# Patient Record
Sex: FEMALE | Race: WHITE | NOT HISPANIC OR LATINO | Employment: OTHER | URBAN - METROPOLITAN AREA
[De-identification: names, ages, dates, MRNs, and addresses within clinical notes are randomized per-mention and may not be internally consistent; named-entity substitution may affect disease eponyms.]

---

## 2022-02-25 ENCOUNTER — HOSPITAL ENCOUNTER (EMERGENCY)
Facility: HOSPITAL | Age: 27
Discharge: HOME | End: 2022-02-25
Attending: EMERGENCY MEDICINE
Payer: COMMERCIAL

## 2022-02-25 VITALS
OXYGEN SATURATION: 100 % | TEMPERATURE: 98 F | DIASTOLIC BLOOD PRESSURE: 55 MMHG | RESPIRATION RATE: 14 BRPM | HEIGHT: 59 IN | HEART RATE: 85 BPM | BODY MASS INDEX: 32.4 KG/M2 | WEIGHT: 160.69 LBS | SYSTOLIC BLOOD PRESSURE: 108 MMHG

## 2022-02-25 DIAGNOSIS — M54.50 ACUTE BILATERAL LOW BACK PAIN WITHOUT SCIATICA: Primary | ICD-10-CM

## 2022-02-25 LAB
ALBUMIN SERPL BCP-MCNC: 4.6 G/DL (ref 3.5–5.2)
ALP SERPL-CCNC: 58 U/L (ref 55–135)
ALT SERPL W/O P-5'-P-CCNC: 53 U/L (ref 10–44)
ANION GAP SERPL CALC-SCNC: 16 MMOL/L (ref 8–16)
AST SERPL-CCNC: 27 U/L (ref 10–40)
B-HCG UR QL: NEGATIVE
BASOPHILS # BLD AUTO: 0.04 K/UL (ref 0–0.2)
BASOPHILS NFR BLD: 0.3 % (ref 0–1.9)
BILIRUB SERPL-MCNC: 0.6 MG/DL (ref 0.1–1)
BILIRUB UR QL STRIP: NEGATIVE
BUN SERPL-MCNC: 11 MG/DL (ref 6–20)
CALCIUM SERPL-MCNC: 9.8 MG/DL (ref 8.7–10.5)
CHLORIDE SERPL-SCNC: 104 MMOL/L (ref 95–110)
CLARITY UR: CLEAR
CO2 SERPL-SCNC: 16 MMOL/L (ref 23–29)
COLOR UR: YELLOW
CREAT SERPL-MCNC: 0.9 MG/DL (ref 0.5–1.4)
DIFFERENTIAL METHOD: ABNORMAL
EOSINOPHIL # BLD AUTO: 0 K/UL (ref 0–0.5)
EOSINOPHIL NFR BLD: 0.2 % (ref 0–8)
ERYTHROCYTE [DISTWIDTH] IN BLOOD BY AUTOMATED COUNT: 13.2 % (ref 11.5–14.5)
EST. GFR  (AFRICAN AMERICAN): >60 ML/MIN/1.73 M^2
EST. GFR  (NON AFRICAN AMERICAN): >60 ML/MIN/1.73 M^2
GLUCOSE SERPL-MCNC: 141 MG/DL (ref 70–110)
GLUCOSE UR QL STRIP: NEGATIVE
HCT VFR BLD AUTO: 42.7 % (ref 37–48.5)
HGB BLD-MCNC: 14.3 G/DL (ref 12–16)
HGB UR QL STRIP: ABNORMAL
IMM GRANULOCYTES # BLD AUTO: 0.09 K/UL (ref 0–0.04)
IMM GRANULOCYTES NFR BLD AUTO: 0.8 % (ref 0–0.5)
KETONES UR QL STRIP: ABNORMAL
LEUKOCYTE ESTERASE UR QL STRIP: NEGATIVE
LIPASE SERPL-CCNC: 9 U/L (ref 4–60)
LYMPHOCYTES # BLD AUTO: 0.3 K/UL (ref 1–4.8)
LYMPHOCYTES NFR BLD: 2.6 % (ref 18–48)
MCH RBC QN AUTO: 27.2 PG (ref 27–31)
MCHC RBC AUTO-ENTMCNC: 33.5 G/DL (ref 32–36)
MCV RBC AUTO: 81 FL (ref 82–98)
MONOCYTES # BLD AUTO: 0.8 K/UL (ref 0.3–1)
MONOCYTES NFR BLD: 7.1 % (ref 4–15)
NEUTROPHILS # BLD AUTO: 10.3 K/UL (ref 1.8–7.7)
NEUTROPHILS NFR BLD: 89 % (ref 38–73)
NITRITE UR QL STRIP: NEGATIVE
NRBC BLD-RTO: 0 /100 WBC
PH UR STRIP: 6 [PH] (ref 5–8)
PLATELET # BLD AUTO: 307 K/UL (ref 150–450)
PMV BLD AUTO: 9 FL (ref 9.2–12.9)
POTASSIUM SERPL-SCNC: 3.7 MMOL/L (ref 3.5–5.1)
PROT SERPL-MCNC: 8.3 G/DL (ref 6–8.4)
PROT UR QL STRIP: NEGATIVE
RBC # BLD AUTO: 5.26 M/UL (ref 4–5.4)
SODIUM SERPL-SCNC: 136 MMOL/L (ref 136–145)
SP GR UR STRIP: 1.02 (ref 1–1.03)
URN SPEC COLLECT METH UR: ABNORMAL
UROBILINOGEN UR STRIP-ACNC: NEGATIVE EU/DL
WBC # BLD AUTO: 11.53 K/UL (ref 3.9–12.7)

## 2022-02-25 PROCEDURE — 25000003 PHARM REV CODE 250: Performed by: EMERGENCY MEDICINE

## 2022-02-25 PROCEDURE — 99284 EMERGENCY DEPT VISIT MOD MDM: CPT | Mod: 25

## 2022-02-25 PROCEDURE — 81025 URINE PREGNANCY TEST: CPT | Performed by: NURSE PRACTITIONER

## 2022-02-25 PROCEDURE — 96365 THER/PROPH/DIAG IV INF INIT: CPT

## 2022-02-25 PROCEDURE — 96375 TX/PRO/DX INJ NEW DRUG ADDON: CPT

## 2022-02-25 PROCEDURE — 85025 COMPLETE CBC W/AUTO DIFF WBC: CPT | Performed by: NURSE PRACTITIONER

## 2022-02-25 PROCEDURE — 63600175 PHARM REV CODE 636 W HCPCS: Performed by: NURSE PRACTITIONER

## 2022-02-25 PROCEDURE — 81003 URINALYSIS AUTO W/O SCOPE: CPT | Performed by: NURSE PRACTITIONER

## 2022-02-25 PROCEDURE — 63600175 PHARM REV CODE 636 W HCPCS: Performed by: EMERGENCY MEDICINE

## 2022-02-25 PROCEDURE — 80053 COMPREHEN METABOLIC PANEL: CPT | Performed by: NURSE PRACTITIONER

## 2022-02-25 PROCEDURE — 83690 ASSAY OF LIPASE: CPT | Performed by: NURSE PRACTITIONER

## 2022-02-25 RX ORDER — ACETAMINOPHEN 500 MG
1000 TABLET ORAL
Status: COMPLETED | OUTPATIENT
Start: 2022-02-25 | End: 2022-02-25

## 2022-02-25 RX ORDER — NAPROXEN 375 MG/1
375 TABLET ORAL 2 TIMES DAILY
Qty: 60 TABLET | Refills: 0 | Status: SHIPPED | OUTPATIENT
Start: 2022-02-25 | End: 2022-05-05

## 2022-02-25 RX ORDER — ONDANSETRON 4 MG/1
4 TABLET, FILM COATED ORAL EVERY 6 HOURS
Qty: 30 TABLET | Refills: 0 | Status: SHIPPED | OUTPATIENT
Start: 2022-02-25 | End: 2022-05-05

## 2022-02-25 RX ORDER — KETOROLAC TROMETHAMINE 30 MG/ML
15 INJECTION, SOLUTION INTRAMUSCULAR; INTRAVENOUS
Status: COMPLETED | OUTPATIENT
Start: 2022-02-25 | End: 2022-02-25

## 2022-02-25 RX ORDER — ONDANSETRON 2 MG/ML
4 INJECTION INTRAMUSCULAR; INTRAVENOUS
Status: COMPLETED | OUTPATIENT
Start: 2022-02-25 | End: 2022-02-25

## 2022-02-25 RX ADMIN — PROMETHAZINE HYDROCHLORIDE 12.5 MG: 25 INJECTION INTRAMUSCULAR; INTRAVENOUS at 05:02

## 2022-02-25 RX ADMIN — ONDANSETRON 4 MG: 2 INJECTION INTRAMUSCULAR; INTRAVENOUS at 03:02

## 2022-02-25 RX ADMIN — KETOROLAC TROMETHAMINE 15 MG: 30 INJECTION, SOLUTION INTRAMUSCULAR at 03:02

## 2022-02-25 RX ADMIN — ACETAMINOPHEN 1000 MG: 500 TABLET ORAL at 03:02

## 2022-02-25 NOTE — FIRST PROVIDER EVALUATION
Medical screening exam completed.  I have conducted a focused provider triage encounter, findings are as follows:    Brief history of present illness:  Presents with complaints of flank pain, body aches, nausea and vomiting.  Patient states she believes she has a kidney stone because this feels like the last time she had 1. Onset of symptoms was today.    There were no vitals filed for this visit.    Pertinent physical exam:      Brief workup plan:      Preliminary workup initiated; this workup will be continued and followed by the physician or advanced practice provider that is assigned to the patient when roomed.

## 2022-02-25 NOTE — ED PROVIDER NOTES
SCRIBE #1 NOTE: I, Ling Valadez, am scribing for, and in the presence of, Jarvis Edgar Do, MD. I have scribed the HPI, ROS, and PEx.          History     Chief Complaint   Patient presents with    Flank Pain     Pain to R side flank radiating down R leg. N/V/D since yesterday am and flank pain started last PM     Review of patient's allergies indicates:  No Known Allergies      History of Present Illness     HPI    2/25/2022, 4:24 AM  History obtained from the patient      History of Present Illness: Zheng Beverly is a 26 y.o. female patient with a PMHx of kidney stones who presents to the Emergency Department for evaluation of L flank pain which onset gradually several hours ago. States the pain is radiating down the front of both legs. Symptoms are constant and moderate in severity. No mitigating or exacerbating factors reported. Associated sxs include n/v/d since yesterday. Patient denies any fever, chills, dysuria, hematuria, frequency, urgency, abdominal pain, and all other sxs at this time. No prior tx reported. Pt states her last kidney stone was when she was 19 and that she was able to pass it. No further complaints or concerns at this time.       Arrival mode: Personal vehicle    PCP: KAITY Bello        Past Medical History:  No past medical history on file.    Past Surgical History:  No past surgical history on file.      Family History:  No family history on file.    Social History:  Social History     Tobacco Use    Smoking status: Not on file    Smokeless tobacco: Not on file   Substance and Sexual Activity    Alcohol use: Not on file    Drug use: Not on file    Sexual activity: Not on file        Review of Systems     Review of Systems   Constitutional: Negative for chills and fever.   HENT: Negative for sore throat.    Respiratory: Negative for shortness of breath.    Cardiovascular: Negative for chest pain.   Gastrointestinal: Positive for diarrhea, nausea and vomiting. Negative for  "abdominal pain.   Genitourinary: Positive for flank pain (L). Negative for dysuria, frequency, hematuria and urgency.   Musculoskeletal: Positive for myalgias (front of BLE). Negative for back pain.   Skin: Negative for rash.   Neurological: Negative for weakness.   Hematological: Does not bruise/bleed easily.   All other systems reviewed and are negative.     Physical Exam     Initial Vitals [02/25/22 0236]   BP Pulse Resp Temp SpO2   122/81 87 16 97.5 °F (36.4 °C) 96 %      MAP       --          Physical Exam  Nursing Notes and Vital Signs Reviewed.  Constitutional: Patient is in no acute distress. Well-developed and well-nourished.  Head: Atraumatic. Normocephalic.  Eyes: PERRL. EOM intact. Conjunctivae are not pale. No scleral icterus.  ENT: Mucous membranes are moist. Oropharynx is clear and symmetric.    Neck: Supple. Full ROM. No lymphadenopathy.  Cardiovascular: Regular rate. Regular rhythm. No murmurs, rubs, or gallops. Distal pulses are 2+ and symmetric.  Pulmonary/Chest: No respiratory distress. Clear to auscultation bilaterally. No wheezing or rales.  Abdominal: Soft and non-distended.  There is no tenderness.  No rebound, guarding, or rigidity. Good bowel sounds.  Genitourinary: No CVA tenderness  Musculoskeletal: Moves all extremities. No obvious deformities. No edema. No calf tenderness. Pt able to walk with no acute problems.   Skin: Warm and dry.  Neurological:  Alert, awake, and appropriate.  Normal speech.  No acute focal neurological deficits are appreciated.  Psychiatric: Normal affect. Good eye contact. Appropriate in content.     ED Course   Procedures  ED Vital Signs:  Vitals:    02/25/22 0236 02/25/22 0510   BP: 122/81 (!) 105/55   Pulse: 87 82   Resp: 16 18   Temp: 97.5 °F (36.4 °C)    TempSrc: Oral    SpO2: 96% 100%   Weight: 72.9 kg (160 lb 11.5 oz)    Height: 4' 11" (1.499 m)        Abnormal Lab Results:  Labs Reviewed   CBC W/ AUTO DIFFERENTIAL - Abnormal; Notable for the following " components:       Result Value    MCV 81 (*)     MPV 9.0 (*)     Immature Granulocytes 0.8 (*)     Gran # (ANC) 10.3 (*)     Immature Grans (Abs) 0.09 (*)     Lymph # 0.3 (*)     Gran % 89.0 (*)     Lymph % 2.6 (*)     All other components within normal limits   COMPREHENSIVE METABOLIC PANEL - Abnormal; Notable for the following components:    CO2 16 (*)     Glucose 141 (*)     ALT 53 (*)     All other components within normal limits   URINALYSIS, REFLEX TO URINE CULTURE - Abnormal; Notable for the following components:    Ketones, UA 1+ (*)     Occult Blood UA Trace (*)     All other components within normal limits    Narrative:     Specimen Source->Urine   LIPASE   PREGNANCY TEST, URINE RAPID    Narrative:     Specimen Source->Urine        All Lab Results:  Results for orders placed or performed during the hospital encounter of 02/25/22   CBC auto differential   Result Value Ref Range    WBC 11.53 3.90 - 12.70 K/uL    RBC 5.26 4.00 - 5.40 M/uL    Hemoglobin 14.3 12.0 - 16.0 g/dL    Hematocrit 42.7 37.0 - 48.5 %    MCV 81 (L) 82 - 98 fL    MCH 27.2 27.0 - 31.0 pg    MCHC 33.5 32.0 - 36.0 g/dL    RDW 13.2 11.5 - 14.5 %    Platelets 307 150 - 450 K/uL    MPV 9.0 (L) 9.2 - 12.9 fL    Immature Granulocytes 0.8 (H) 0.0 - 0.5 %    Gran # (ANC) 10.3 (H) 1.8 - 7.7 K/uL    Immature Grans (Abs) 0.09 (H) 0.00 - 0.04 K/uL    Lymph # 0.3 (L) 1.0 - 4.8 K/uL    Mono # 0.8 0.3 - 1.0 K/uL    Eos # 0.0 0.0 - 0.5 K/uL    Baso # 0.04 0.00 - 0.20 K/uL    nRBC 0 0 /100 WBC    Gran % 89.0 (H) 38.0 - 73.0 %    Lymph % 2.6 (L) 18.0 - 48.0 %    Mono % 7.1 4.0 - 15.0 %    Eosinophil % 0.2 0.0 - 8.0 %    Basophil % 0.3 0.0 - 1.9 %    Differential Method Automated    Comprehensive metabolic panel   Result Value Ref Range    Sodium 136 136 - 145 mmol/L    Potassium 3.7 3.5 - 5.1 mmol/L    Chloride 104 95 - 110 mmol/L    CO2 16 (L) 23 - 29 mmol/L    Glucose 141 (H) 70 - 110 mg/dL    BUN 11 6 - 20 mg/dL    Creatinine 0.9 0.5 - 1.4 mg/dL     Calcium 9.8 8.7 - 10.5 mg/dL    Total Protein 8.3 6.0 - 8.4 g/dL    Albumin 4.6 3.5 - 5.2 g/dL    Total Bilirubin 0.6 0.1 - 1.0 mg/dL    Alkaline Phosphatase 58 55 - 135 U/L    AST 27 10 - 40 U/L    ALT 53 (H) 10 - 44 U/L    Anion Gap 16 8 - 16 mmol/L    eGFR if African American >60 >60 mL/min/1.73 m^2    eGFR if non African American >60 >60 mL/min/1.73 m^2   Lipase   Result Value Ref Range    Lipase 9 4 - 60 U/L   Urinalysis, Reflex to Urine Culture Urine, Clean Catch    Specimen: Urine   Result Value Ref Range    Specimen UA Urine, Clean Catch     Color, UA Yellow Yellow, Straw, Kelly    Appearance, UA Clear Clear    pH, UA 6.0 5.0 - 8.0    Specific Gravity, UA 1.020 1.005 - 1.030    Protein, UA Negative Negative    Glucose, UA Negative Negative    Ketones, UA 1+ (A) Negative    Bilirubin (UA) Negative Negative    Occult Blood UA Trace (A) Negative    Nitrite, UA Negative Negative    Urobilinogen, UA Negative <2.0 EU/dL    Leukocytes, UA Negative Negative   Pregnancy, urine rapid   Result Value Ref Range    Preg Test, Ur Negative        Imaging Results:  Imaging Results          CT Renal Stone Study ABD Pelvis WO (In process)                Per Stat rad no Acute findings in abdomen or pelvis             The Emergency Provider reviewed the vital signs and test results, which are outlined above.     ED Discussion     5:57 AM: Reassessed pt at this time. Discussed with pt all pertinent ED information and results. Discussed pt dx and plan of tx. Gave pt all f/u and return to the ED instructions. All questions and concerns were addressed at this time. Pt expresses understanding of information and instructions, and is comfortable with plan to discharge. Pt is stable for discharge.    I discussed with patient and/or family/caretaker that evaluation in the ED does not suggest any emergent or life threatening medical conditions requiring immediate intervention beyond what was provided in the ED, and I believe patient is  safe for discharge.  Regardless, an unremarkable evaluation in the ED does not preclude the development or presence of a serious of life threatening condition. As such, patient was instructed to return immediately for any worsening or change in current symptoms.      Medical Decision Making:   Clinical Tests:   Lab Tests: Ordered and Reviewed  Radiological Study: Ordered and Reviewed           ED Medication(s):  Medications   ondansetron injection 4 mg (4 mg Intravenous Given 2/25/22 0312)   acetaminophen tablet 1,000 mg (1,000 mg Oral Given 2/25/22 0312)   ketorolac injection 15 mg (15 mg Intravenous Given 2/25/22 0340)   promethazine (PHENERGAN) 12.5 mg in dextrose 5 % 50 mL IVPB (12.5 mg Intravenous New Bag 2/25/22 0520)       New Prescriptions    NAPROXEN (EC-NAPROSYN) 375 MG TBEC EC TABLET    Take 1 tablet (375 mg total) by mouth 2 (two) times daily.    ONDANSETRON (ZOFRAN) 4 MG TABLET    Take 1 tablet (4 mg total) by mouth every 6 (six) hours.        Follow-up Information     KAITY Bello In 2 days.    Specialty: Family Medicine  Contact information:  [address] [address]  [address] [place] [address]  [phone]                             Scribe Attestation:   Scribe #1: I performed the above scribed service and the documentation accurately describes the services I performed. I attest to the accuracy of the note.     Attending:   Physician Attestation Statement for Scribe #1: I, Jarvis Edgar Do, MD, personally performed the services described in this documentation, as scribed by Ling Valadez, in my presence, and it is both accurate and complete.           Clinical Impression       ICD-10-CM ICD-9-CM   1. Acute bilateral low back pain without sciatica  M54.50 724.2     338.19       Disposition:   Disposition: Discharged  Condition: Stable         Jarvis Edgar Do, MD  02/25/22 0600

## 2023-09-13 ENCOUNTER — HOSPITAL ENCOUNTER (EMERGENCY)
Facility: HOSPITAL | Age: 28
Discharge: HOME | End: 2023-09-13
Attending: STUDENT IN AN ORGANIZED HEALTH CARE EDUCATION/TRAINING PROGRAM
Payer: MEDICAID

## 2023-09-13 VITALS
OXYGEN SATURATION: 97 % | WEIGHT: 157.63 LBS | TEMPERATURE: 99 F | DIASTOLIC BLOOD PRESSURE: 60 MMHG | RESPIRATION RATE: 18 BRPM | SYSTOLIC BLOOD PRESSURE: 115 MMHG | HEART RATE: 101 BPM | BODY MASS INDEX: 31.84 KG/M2

## 2023-09-13 DIAGNOSIS — N39.0 URINARY TRACT INFECTION WITHOUT HEMATURIA, SITE UNSPECIFIED: Primary | ICD-10-CM

## 2023-09-13 LAB
ALBUMIN SERPL BCP-MCNC: 2.7 G/DL (ref 3.5–5.2)
ALP SERPL-CCNC: 71 U/L (ref 55–135)
ALT SERPL W/O P-5'-P-CCNC: 17 U/L (ref 10–44)
ANION GAP SERPL CALC-SCNC: 10 MMOL/L (ref 8–16)
AST SERPL-CCNC: 10 U/L (ref 10–40)
BACTERIA #/AREA URNS HPF: ABNORMAL /HPF
BASOPHILS # BLD AUTO: 0.02 K/UL (ref 0–0.2)
BASOPHILS NFR BLD: 0.1 % (ref 0–1.9)
BILIRUB SERPL-MCNC: 0.9 MG/DL (ref 0.1–1)
BILIRUB UR QL STRIP: NEGATIVE
BUN SERPL-MCNC: 4 MG/DL (ref 6–20)
CALCIUM SERPL-MCNC: 8.8 MG/DL (ref 8.7–10.5)
CHLORIDE SERPL-SCNC: 101 MMOL/L (ref 95–110)
CLARITY UR: CLEAR
CO2 SERPL-SCNC: 20 MMOL/L (ref 23–29)
COLOR UR: YELLOW
CREAT SERPL-MCNC: 0.7 MG/DL (ref 0.5–1.4)
DIFFERENTIAL METHOD: ABNORMAL
EOSINOPHIL # BLD AUTO: 0.1 K/UL (ref 0–0.5)
EOSINOPHIL NFR BLD: 0.6 % (ref 0–8)
ERYTHROCYTE [DISTWIDTH] IN BLOOD BY AUTOMATED COUNT: 13.8 % (ref 11.5–14.5)
EST. GFR  (NO RACE VARIABLE): >60 ML/MIN/1.73 M^2
GLUCOSE SERPL-MCNC: 114 MG/DL (ref 70–110)
GLUCOSE UR QL STRIP: NEGATIVE
HCT VFR BLD AUTO: 30.5 % (ref 37–48.5)
HGB BLD-MCNC: 10 G/DL (ref 12–16)
HGB UR QL STRIP: ABNORMAL
HYALINE CASTS #/AREA URNS LPF: 0 /LPF
IMM GRANULOCYTES # BLD AUTO: 0.33 K/UL (ref 0–0.04)
IMM GRANULOCYTES NFR BLD AUTO: 2.3 % (ref 0–0.5)
KETONES UR QL STRIP: NEGATIVE
LACTATE SERPL-SCNC: 1.5 MMOL/L (ref 0.5–2.2)
LEUKOCYTE ESTERASE UR QL STRIP: ABNORMAL
LYMPHOCYTES # BLD AUTO: 0.8 K/UL (ref 1–4.8)
LYMPHOCYTES NFR BLD: 5.5 % (ref 18–48)
MCH RBC QN AUTO: 27.2 PG (ref 27–31)
MCHC RBC AUTO-ENTMCNC: 32.8 G/DL (ref 32–36)
MCV RBC AUTO: 83 FL (ref 82–98)
MICROSCOPIC COMMENT: ABNORMAL
MONOCYTES # BLD AUTO: 1.2 K/UL (ref 0.3–1)
MONOCYTES NFR BLD: 8 % (ref 4–15)
NEUTROPHILS # BLD AUTO: 12.1 K/UL (ref 1.8–7.7)
NEUTROPHILS NFR BLD: 83.5 % (ref 38–73)
NITRITE UR QL STRIP: NEGATIVE
NON-SQ EPI CELLS #/AREA URNS HPF: 2 /HPF
NRBC BLD-RTO: 0 /100 WBC
PH UR STRIP: 6 [PH] (ref 5–8)
PLATELET # BLD AUTO: 198 K/UL (ref 150–450)
PMV BLD AUTO: 9.4 FL (ref 9.2–12.9)
POTASSIUM SERPL-SCNC: 3.2 MMOL/L (ref 3.5–5.1)
PROT SERPL-MCNC: 6.6 G/DL (ref 6–8.4)
PROT UR QL STRIP: ABNORMAL
RBC # BLD AUTO: 3.68 M/UL (ref 4–5.4)
RBC #/AREA URNS HPF: 10 /HPF (ref 0–4)
SODIUM SERPL-SCNC: 131 MMOL/L (ref 136–145)
SP GR UR STRIP: 1.02 (ref 1–1.03)
SQUAMOUS #/AREA URNS HPF: 10 /HPF
URN SPEC COLLECT METH UR: ABNORMAL
UROBILINOGEN UR STRIP-ACNC: ABNORMAL EU/DL
WBC # BLD AUTO: 14.46 K/UL (ref 3.9–12.7)
WBC #/AREA URNS HPF: 60 /HPF (ref 0–5)

## 2023-09-13 PROCEDURE — 87040 BLOOD CULTURE FOR BACTERIA: CPT | Mod: 59 | Performed by: NURSE PRACTITIONER

## 2023-09-13 PROCEDURE — 85025 COMPLETE CBC W/AUTO DIFF WBC: CPT | Performed by: NURSE PRACTITIONER

## 2023-09-13 PROCEDURE — 36415 COLL VENOUS BLD VENIPUNCTURE: CPT | Performed by: NURSE PRACTITIONER

## 2023-09-13 PROCEDURE — 83605 ASSAY OF LACTIC ACID: CPT | Performed by: NURSE PRACTITIONER

## 2023-09-13 PROCEDURE — 63600175 PHARM REV CODE 636 W HCPCS: Performed by: NURSE PRACTITIONER

## 2023-09-13 PROCEDURE — 81000 URINALYSIS NONAUTO W/SCOPE: CPT | Performed by: NURSE PRACTITIONER

## 2023-09-13 PROCEDURE — 87086 URINE CULTURE/COLONY COUNT: CPT | Performed by: NURSE PRACTITIONER

## 2023-09-13 PROCEDURE — 99284 EMERGENCY DEPT VISIT MOD MDM: CPT

## 2023-09-13 PROCEDURE — 80053 COMPREHEN METABOLIC PANEL: CPT | Performed by: NURSE PRACTITIONER

## 2023-09-13 PROCEDURE — 25000003 PHARM REV CODE 250: Performed by: NURSE PRACTITIONER

## 2023-09-13 PROCEDURE — 96372 THER/PROPH/DIAG INJ SC/IM: CPT | Performed by: NURSE PRACTITIONER

## 2023-09-13 RX ORDER — CEFTRIAXONE 1 G/1
1 INJECTION, POWDER, FOR SOLUTION INTRAMUSCULAR; INTRAVENOUS
Status: COMPLETED | OUTPATIENT
Start: 2023-09-13 | End: 2023-09-13

## 2023-09-13 RX ORDER — ACETAMINOPHEN 500 MG
1000 TABLET ORAL
Status: COMPLETED | OUTPATIENT
Start: 2023-09-13 | End: 2023-09-13

## 2023-09-13 RX ADMIN — CEFTRIAXONE SODIUM 1 G: 1 INJECTION, POWDER, FOR SOLUTION INTRAMUSCULAR; INTRAVENOUS at 04:09

## 2023-09-13 RX ADMIN — ACETAMINOPHEN 1000 MG: 500 TABLET ORAL at 03:09

## 2023-09-13 NOTE — ED PROVIDER NOTES
Encounter Date: 2023       History     Chief Complaint   Patient presents with    Urinary Tract Infection     Patient is 20 weeks pregnant to ER reports she has a UTI DX 3 days ago has been taking amox for this but states she still has a fever      Chief complaint: UTI, fever  28-year-old female who is currently 20 weeks pregnant presents to be evaluated for fever she is had for the last 3 days.  She reports she was diagnosed at an urgent care with a UTI and started on amoxicillin.  She reports her fever has persisted with a T-max of 101°.  States the last time she took her temperature was 4:00 a.m. this morning.  She reports she has been taking Tylenol.  Reports some mild low back pain denies any vaginal bleeding, discharge, flu like good or contractions.  States she contacted her OBGYN was referred to the nearest emergency room.  She reports she is continued to eat and drink per usual denies any hematuria or odor or color change.       Review of patient's allergies indicates:  No Known Allergies  History reviewed. No pertinent past medical history.  Past Surgical History:   Procedure Laterality Date     SECTION       History reviewed. No pertinent family history.  Social History     Tobacco Use    Smoking status: Never    Smokeless tobacco: Never     Review of Systems   Constitutional:  Positive for fever.   Genitourinary:  Positive for pelvic pain. Negative for dysuria and hematuria.   Musculoskeletal:  Positive for back pain.       Physical Exam     Initial Vitals [23 1401]   BP Pulse Resp Temp SpO2   116/66 (!) 113 18 98.6 °F (37 °C) 97 %      MAP       --         Physical Exam    Nursing note and vitals reviewed.  Constitutional: She appears well-developed and well-nourished.   HENT:   Head: Normocephalic and atraumatic.   Cardiovascular:  Normal rate, regular rhythm, normal heart sounds and intact distal pulses.     Exam reveals no gallop and no friction rub.       No murmur  heard.  Pulmonary/Chest: Breath sounds normal. She has no wheezes. She has no rhonchi. She has no rales.   Abdominal:   Gravid uterus     Neurological: She is alert and oriented to person, place, and time.   Skin: Skin is warm and dry.   Psychiatric: She has a normal mood and affect. Thought content normal.         ED Course   Procedures  Labs Reviewed   CBC W/ AUTO DIFFERENTIAL - Abnormal; Notable for the following components:       Result Value    WBC 14.46 (*)     RBC 3.68 (*)     Hemoglobin 10.0 (*)     Hematocrit 30.5 (*)     Immature Granulocytes 2.3 (*)     Gran # (ANC) 12.1 (*)     Immature Grans (Abs) 0.33 (*)     Lymph # 0.8 (*)     Mono # 1.2 (*)     Gran % 83.5 (*)     Lymph % 5.5 (*)     All other components within normal limits   COMPREHENSIVE METABOLIC PANEL - Abnormal; Notable for the following components:    Sodium 131 (*)     Potassium 3.2 (*)     CO2 20 (*)     Glucose 114 (*)     BUN 4 (*)     Albumin 2.7 (*)     All other components within normal limits   URINALYSIS, REFLEX TO URINE CULTURE - Abnormal; Notable for the following components:    Protein, UA 1+ (*)     Occult Blood UA Trace (*)     Urobilinogen, UA 4.0-6.0 (*)     Leukocytes, UA 1+ (*)     All other components within normal limits    Narrative:     Specimen Source->Urine   URINALYSIS MICROSCOPIC - Abnormal; Notable for the following components:    RBC, UA 10 (*)     WBC, UA 60 (*)     Bacteria Moderate (*)     Non-Squam Epith 2 (*)     All other components within normal limits    Narrative:     Specimen Source->Urine   CULTURE, BLOOD   CULTURE, BLOOD   CULTURE, URINE   LACTIC ACID, PLASMA          Imaging Results    None          Medications   acetaminophen tablet 1,000 mg (1,000 mg Oral Given 9/13/23 5457)   cefTRIAXone injection 1 g (1 g Intramuscular Given 9/13/23 8674)     Medical Decision Making  [age] year female currently 10 weeks pregnant presents to be evaluated for fever and recently being diagnosed for UTI.  Patient  reports a T-max of 101°.  Reports last temperature was at 4:00 a.m. this morning.  She is not taking any Tylenol since.  She is afebrile in ED today   Denies abdominal pain cramping bleeding discharge denies dysuria hematuria   Differential diagnoses include UTI, fever, pyelo    Amount and/or Complexity of Data Reviewed  Labs: ordered.     Details: CBC, CMP, lactate, blood cultures, UA    Risk  OTC drugs.  Prescription drug management.  Risk Details: Patient appears well in ED today with no signs of toxicity   Afebrile  Patient with very mild leukocytosis   Patient is noted to have a UTI on UA  No CVA tenderness  Unlikely pyelo   Given Rocephin IM in ED   Attempted to obtain previous culture from recent UA however was unsuccessful  Will continue amoxicillin   Discussed possible admission versus discharge with close follow-up with OBGYN.    Patient wishes to receive IM antibiotics and follow-up with her OBGYN tomorrow   Given strict return precautions                                 Clinical Impression:   Final diagnoses:  [N39.0] Urinary tract infection without hematuria, site unspecified (Primary)        ED Disposition Condition    Discharge Stable          ED Prescriptions    None       Follow-up Information    None          Hayley Saldana NP  09/13/23 4655

## 2023-09-13 NOTE — DISCHARGE INSTRUCTIONS
Please continue taking your antibiotics   Please follow-up with your OBGYN tomorrow  If you develop any new worrisome symptoms please return to the emergency room in the meantime

## 2023-09-15 LAB — BACTERIA UR CULT: NO GROWTH

## 2023-09-19 LAB
BACTERIA BLD CULT: NORMAL
BACTERIA BLD CULT: NORMAL

## 2023-11-20 ENCOUNTER — HOSPITAL ENCOUNTER (EMERGENCY)
Facility: HOSPITAL | Age: 28
Discharge: HOME | End: 2023-11-20
Attending: SURGERY
Payer: MEDICAID

## 2023-11-20 VITALS
TEMPERATURE: 98 F | BODY MASS INDEX: 33.55 KG/M2 | RESPIRATION RATE: 18 BRPM | HEART RATE: 77 BPM | DIASTOLIC BLOOD PRESSURE: 66 MMHG | WEIGHT: 166.13 LBS | SYSTOLIC BLOOD PRESSURE: 100 MMHG | OXYGEN SATURATION: 99 %

## 2023-11-20 DIAGNOSIS — R11.2 NAUSEA AND VOMITING, UNSPECIFIED VOMITING TYPE: Primary | ICD-10-CM

## 2023-11-20 LAB
ALBUMIN SERPL BCP-MCNC: 2.7 G/DL (ref 3.5–5.2)
ALP SERPL-CCNC: 76 U/L (ref 55–135)
ALT SERPL W/O P-5'-P-CCNC: 20 U/L (ref 10–44)
ANION GAP SERPL CALC-SCNC: 12 MMOL/L (ref 8–16)
AST SERPL-CCNC: 10 U/L (ref 10–40)
BASOPHILS # BLD AUTO: 0.08 K/UL (ref 0–0.2)
BASOPHILS NFR BLD: 0.6 % (ref 0–1.9)
BILIRUB SERPL-MCNC: 0.3 MG/DL (ref 0.1–1)
BILIRUB UR QL STRIP: NEGATIVE
BUN SERPL-MCNC: 3 MG/DL (ref 6–20)
CALCIUM SERPL-MCNC: 9 MG/DL (ref 8.7–10.5)
CHLORIDE SERPL-SCNC: 105 MMOL/L (ref 95–110)
CLARITY UR: CLEAR
CO2 SERPL-SCNC: 20 MMOL/L (ref 23–29)
COLOR UR: YELLOW
CREAT SERPL-MCNC: 0.7 MG/DL (ref 0.5–1.4)
DIFFERENTIAL METHOD: ABNORMAL
EOSINOPHIL # BLD AUTO: 0.4 K/UL (ref 0–0.5)
EOSINOPHIL NFR BLD: 2.9 % (ref 0–8)
ERYTHROCYTE [DISTWIDTH] IN BLOOD BY AUTOMATED COUNT: 14.3 % (ref 11.5–14.5)
EST. GFR  (NO RACE VARIABLE): >60 ML/MIN/1.73 M^2
GLUCOSE SERPL-MCNC: 89 MG/DL (ref 70–110)
GLUCOSE UR QL STRIP: NEGATIVE
HCT VFR BLD AUTO: 33.6 % (ref 37–48.5)
HGB BLD-MCNC: 10.7 G/DL (ref 12–16)
HGB UR QL STRIP: NEGATIVE
IMM GRANULOCYTES # BLD AUTO: 0.51 K/UL (ref 0–0.04)
IMM GRANULOCYTES NFR BLD AUTO: 3.9 % (ref 0–0.5)
INFLUENZA A, MOLECULAR: NEGATIVE
INFLUENZA B, MOLECULAR: NEGATIVE
KETONES UR QL STRIP: NEGATIVE
LEUKOCYTE ESTERASE UR QL STRIP: NEGATIVE
LIPASE SERPL-CCNC: 6 U/L (ref 4–60)
LYMPHOCYTES # BLD AUTO: 2.3 K/UL (ref 1–4.8)
LYMPHOCYTES NFR BLD: 18 % (ref 18–48)
MCH RBC QN AUTO: 25.8 PG (ref 27–31)
MCHC RBC AUTO-ENTMCNC: 31.8 G/DL (ref 32–36)
MCV RBC AUTO: 81 FL (ref 82–98)
MONOCYTES # BLD AUTO: 0.9 K/UL (ref 0.3–1)
MONOCYTES NFR BLD: 6.6 % (ref 4–15)
NEUTROPHILS # BLD AUTO: 8.8 K/UL (ref 1.8–7.7)
NEUTROPHILS NFR BLD: 68 % (ref 38–73)
NITRITE UR QL STRIP: NEGATIVE
NRBC BLD-RTO: 0 /100 WBC
PH UR STRIP: 7 [PH] (ref 5–8)
PLATELET # BLD AUTO: 252 K/UL (ref 150–450)
PMV BLD AUTO: 9.3 FL (ref 9.2–12.9)
POTASSIUM SERPL-SCNC: 3.6 MMOL/L (ref 3.5–5.1)
PROT SERPL-MCNC: 6.8 G/DL (ref 6–8.4)
PROT UR QL STRIP: NEGATIVE
RBC # BLD AUTO: 4.15 M/UL (ref 4–5.4)
SARS-COV-2 RDRP RESP QL NAA+PROBE: NEGATIVE
SODIUM SERPL-SCNC: 137 MMOL/L (ref 136–145)
SP GR UR STRIP: 1.01 (ref 1–1.03)
SPECIMEN SOURCE: NORMAL
URN SPEC COLLECT METH UR: NORMAL
UROBILINOGEN UR STRIP-ACNC: NEGATIVE EU/DL
WBC # BLD AUTO: 12.92 K/UL (ref 3.9–12.7)

## 2023-11-20 PROCEDURE — 25000003 PHARM REV CODE 250: Performed by: NURSE PRACTITIONER

## 2023-11-20 PROCEDURE — 99284 EMERGENCY DEPT VISIT MOD MDM: CPT | Mod: 25

## 2023-11-20 PROCEDURE — U0002 COVID-19 LAB TEST NON-CDC: HCPCS | Performed by: NURSE PRACTITIONER

## 2023-11-20 PROCEDURE — 96361 HYDRATE IV INFUSION ADD-ON: CPT

## 2023-11-20 PROCEDURE — 81003 URINALYSIS AUTO W/O SCOPE: CPT | Performed by: NURSE PRACTITIONER

## 2023-11-20 PROCEDURE — 87502 INFLUENZA DNA AMP PROBE: CPT | Performed by: NURSE PRACTITIONER

## 2023-11-20 PROCEDURE — 63600175 PHARM REV CODE 636 W HCPCS: Performed by: NURSE PRACTITIONER

## 2023-11-20 PROCEDURE — 83690 ASSAY OF LIPASE: CPT | Performed by: NURSE PRACTITIONER

## 2023-11-20 PROCEDURE — 96374 THER/PROPH/DIAG INJ IV PUSH: CPT

## 2023-11-20 PROCEDURE — 85025 COMPLETE CBC W/AUTO DIFF WBC: CPT | Performed by: NURSE PRACTITIONER

## 2023-11-20 PROCEDURE — 80053 COMPREHEN METABOLIC PANEL: CPT | Performed by: NURSE PRACTITIONER

## 2023-11-20 RX ORDER — ONDANSETRON 2 MG/ML
4 INJECTION INTRAMUSCULAR; INTRAVENOUS
Status: COMPLETED | OUTPATIENT
Start: 2023-11-20 | End: 2023-11-20

## 2023-11-20 RX ORDER — SODIUM CHLORIDE 9 MG/ML
1000 INJECTION, SOLUTION INTRAVENOUS
Status: COMPLETED | OUTPATIENT
Start: 2023-11-20 | End: 2023-11-20

## 2023-11-20 RX ADMIN — SODIUM CHLORIDE 1000 ML: 9 INJECTION, SOLUTION INTRAVENOUS at 01:11

## 2023-11-20 RX ADMIN — ONDANSETRON 4 MG: 2 INJECTION INTRAMUSCULAR; INTRAVENOUS at 01:11

## 2023-11-20 NOTE — ED PROVIDER NOTES
Encounter Date: 2023       History     Chief Complaint   Patient presents with    Emesis     Patient 29 weeks pregnant to ER CC of vomiting all day today      Zheng Beverly is a 28 y.o. female who is 29 weeks IUP followed by Dr.Charles Mesa, OB/GYN who presents to the ED for evaluation of vomiting.  Patient reports chronic hyperemesis throughout pregnancy.  She woke up this morning with nausea with multiple episodes of bilious, nonbloody emesis that was unrelieved by p.o. Zofran at home.  She is not tolerating p.o. intake and is concerned that she is dehydrated.  She denies fever or URI symptoms.  Denies abdominal pain, cramping, leakage, bleeding.  Denies UTI symptoms.  Denies loose stool.    The history is provided by the patient.     Review of patient's allergies indicates:  No Known Allergies  History reviewed. No pertinent past medical history.  Past Surgical History:   Procedure Laterality Date     SECTION       History reviewed. No pertinent family history.  Social History     Tobacco Use    Smoking status: Never    Smokeless tobacco: Never     Review of Systems   Constitutional:  Positive for activity change and appetite change. Negative for chills and fever.   HENT:  Negative for congestion, ear discharge, ear pain, postnasal drip, sinus pressure, sinus pain and sore throat.    Respiratory:  Negative for cough, chest tightness and shortness of breath.    Cardiovascular:  Negative for chest pain.   Gastrointestinal:  Positive for nausea and vomiting. Negative for abdominal distention and abdominal pain.   Genitourinary:  Negative for dysuria, frequency and urgency.   Musculoskeletal:  Negative for back pain.   Skin:  Negative for rash.   Neurological:  Negative for dizziness, weakness, light-headedness and numbness.   Hematological:  Does not bruise/bleed easily.       Physical Exam     Initial Vitals [23 1244]   BP Pulse Resp Temp SpO2   138/79 79 18 98.1 °F (36.7 °C) 99 %      MAP        --         Physical Exam    Nursing note and vitals reviewed.  Constitutional: She appears well-developed and well-nourished.   HENT:   Head: Normocephalic and atraumatic.   Right Ear: Tympanic membrane, external ear and ear canal normal. Tympanic membrane is not erythematous. No middle ear effusion.   Left Ear: Tympanic membrane, external ear and ear canal normal. Tympanic membrane is not erythematous.  No middle ear effusion.   Nose: Nose normal.   Mouth/Throat: Uvula is midline, oropharynx is clear and moist and mucous membranes are normal. Mucous membranes are not pale and not dry.   Eyes: Conjunctivae and EOM are normal. Pupils are equal, round, and reactive to light.   Neck: Neck supple.   Normal range of motion.  Cardiovascular:  Normal rate, regular rhythm, normal heart sounds and intact distal pulses.           Pulmonary/Chest: Effort normal and breath sounds normal. She has no decreased breath sounds. She has no wheezes. She has no rhonchi. She has no rales.   Abdominal: Abdomen is soft. Bowel sounds are normal. There is no abdominal tenderness.   Musculoskeletal:         General: Normal range of motion.      Cervical back: Normal range of motion and neck supple.     Neurological: She is alert and oriented to person, place, and time. She has normal strength. She displays normal reflexes. No cranial nerve deficit or sensory deficit.   Skin: Skin is warm and dry. Capillary refill takes less than 2 seconds. No rash noted.   Psychiatric: She has a normal mood and affect. Her behavior is normal. Judgment and thought content normal.         ED Course   Procedures  Labs Reviewed   CBC W/ AUTO DIFFERENTIAL - Abnormal; Notable for the following components:       Result Value    WBC 12.92 (*)     Hemoglobin 10.7 (*)     Hematocrit 33.6 (*)     MCV 81 (*)     MCH 25.8 (*)     MCHC 31.8 (*)     Immature Granulocytes 3.9 (*)     Gran # (ANC) 8.8 (*)     Immature Grans (Abs) 0.51 (*)     All other components within  normal limits   COMPREHENSIVE METABOLIC PANEL - Abnormal; Notable for the following components:    CO2 20 (*)     BUN 3 (*)     Albumin 2.7 (*)     All other components within normal limits   INFLUENZA A & B BY MOLECULAR   LIPASE   URINALYSIS, REFLEX TO URINE CULTURE    Narrative:     Specimen Source->Urine   SARS-COV-2 RNA AMPLIFICATION, QUAL          Imaging Results    None          Medications   0.9%  NaCl infusion (0 mLs Intravenous Stopped 11/20/23 1403)   ondansetron injection 4 mg (4 mg Intravenous Given 11/20/23 1301)     Medical Decision Making  29 week pregnant female presents with nausea vomiting in the emergency room today  Patient has had nausea vomiting throughout the pregnancy, typically seen in [place]  Patient's OBGYN is in [place] but she moved to local area this last week per history  Soft gravid abdomen, good fetal heart tones, denies any contractions or bleeding/spotting    Differential Diagnosis  Hyperemesis gravidarum, enteritis, gastroenteritis, UTI, nausea vomiting NOS    Problems Addressed:  Nausea and vomiting, unspecified vomiting type: complicated acute illness or injury    Amount and/or Complexity of Data Reviewed  Labs: ordered. Decision-making details documented in ED Course.    ED Management & Risk of Complications, Morbidity, Mortality:  Lab work within normal limits for pregnancy status on review today  Fetal heart tones of 135, completely normal on ER evaluation today  Patient has no abdominal pain, has had emesis throughout pregnancy  IV fluids given, antiemetics given with good result in the ER today  Patient's OBGYN is currently located in the [facility] [address]  I discussed this patient with our OBGYN today Dr. Atkins at length    I have counseled this patient to follow-up with our local OBGYN clinic  This patient is in the 3rd trimester pregnancy, now living local area  She needs to at least establish a relationship with the local OBGYN  Patient carefully counseled  to return to ER with any concerns after DC  No nausea no vomiting, no abdominal pain, 100% asymptomatic on DC    Clinical Impression:  Final diagnoses:  [R11.2] Nausea and vomiting, unspecified vomiting type (Primary)        ED Disposition Condition    Discharge Stable          ED Prescriptions    None       Follow-up Information       Follow up With Specialties Details Why Contact Info    Mel Hammond MD Obstetrics and Gynecology Go in 2 days  [address] [place] [address]  [phone]               Ovi Gonzalez MD  11/20/23 8890

## 2023-11-20 NOTE — ED NOTES
Pt escorted from triage/moved over from ambulance stretcher to ER stretcher.   Pt placed on  continuous pulse ox, and NIBP cuff set to cycle.

## 2024-05-14 ENCOUNTER — HOSPITAL ENCOUNTER (EMERGENCY)
Facility: HOSPITAL | Age: 29
Discharge: HOME | End: 2024-05-14
Attending: EMERGENCY MEDICINE
Payer: COMMERCIAL

## 2024-05-14 VITALS
OXYGEN SATURATION: 99 % | RESPIRATION RATE: 20 BRPM | HEIGHT: 59 IN | BODY MASS INDEX: 30.71 KG/M2 | SYSTOLIC BLOOD PRESSURE: 112 MMHG | DIASTOLIC BLOOD PRESSURE: 70 MMHG | HEART RATE: 61 BPM | TEMPERATURE: 98 F | WEIGHT: 152.31 LBS

## 2024-05-14 DIAGNOSIS — R07.89 ATYPICAL CHEST PAIN: Primary | ICD-10-CM

## 2024-05-14 DIAGNOSIS — R07.9 CHEST PAIN: ICD-10-CM

## 2024-05-14 LAB
ALBUMIN SERPL BCP-MCNC: 4.3 G/DL (ref 3.5–5.2)
ALP SERPL-CCNC: 50 U/L (ref 55–135)
ALT SERPL W/O P-5'-P-CCNC: 41 U/L (ref 10–44)
ANION GAP SERPL CALC-SCNC: 9 MMOL/L (ref 8–16)
AST SERPL-CCNC: 20 U/L (ref 10–40)
B-HCG UR QL: NEGATIVE
BACTERIA #/AREA URNS HPF: ABNORMAL /HPF
BASOPHILS # BLD AUTO: 0.04 K/UL (ref 0–0.2)
BASOPHILS NFR BLD: 0.6 % (ref 0–1.9)
BILIRUB SERPL-MCNC: 0.5 MG/DL (ref 0.1–1)
BILIRUB UR QL STRIP: NEGATIVE
BNP SERPL-MCNC: 25 PG/ML (ref 0–99)
BUN SERPL-MCNC: 13 MG/DL (ref 6–20)
CALCIUM SERPL-MCNC: 9.9 MG/DL (ref 8.7–10.5)
CHLORIDE SERPL-SCNC: 104 MMOL/L (ref 95–110)
CLARITY UR: CLEAR
CO2 SERPL-SCNC: 27 MMOL/L (ref 23–29)
COLOR UR: YELLOW
CREAT SERPL-MCNC: 0.9 MG/DL (ref 0.5–1.4)
D DIMER PPP IA.FEU-MCNC: <0.19 MG/L FEU
DIFFERENTIAL METHOD BLD: ABNORMAL
EOSINOPHIL # BLD AUTO: 0.2 K/UL (ref 0–0.5)
EOSINOPHIL NFR BLD: 3 % (ref 0–8)
ERYTHROCYTE [DISTWIDTH] IN BLOOD BY AUTOMATED COUNT: 15.2 % (ref 11.5–14.5)
EST. GFR  (NO RACE VARIABLE): >60 ML/MIN/1.73 M^2
GLUCOSE SERPL-MCNC: 93 MG/DL (ref 70–110)
GLUCOSE UR QL STRIP: NEGATIVE
HCT VFR BLD AUTO: 40.8 % (ref 37–48.5)
HGB BLD-MCNC: 12.9 G/DL (ref 12–16)
HGB UR QL STRIP: NEGATIVE
HYALINE CASTS #/AREA URNS LPF: 0 /LPF
IMM GRANULOCYTES # BLD AUTO: 0.03 K/UL (ref 0–0.04)
IMM GRANULOCYTES NFR BLD AUTO: 0.4 % (ref 0–0.5)
KETONES UR QL STRIP: NEGATIVE
LEUKOCYTE ESTERASE UR QL STRIP: ABNORMAL
LIPASE SERPL-CCNC: 18 U/L (ref 4–60)
LYMPHOCYTES # BLD AUTO: 1.8 K/UL (ref 1–4.8)
LYMPHOCYTES NFR BLD: 25.4 % (ref 18–48)
MCH RBC QN AUTO: 26 PG (ref 27–31)
MCHC RBC AUTO-ENTMCNC: 31.6 G/DL (ref 32–36)
MCV RBC AUTO: 82 FL (ref 82–98)
MICROSCOPIC COMMENT: ABNORMAL
MONOCYTES # BLD AUTO: 0.5 K/UL (ref 0.3–1)
MONOCYTES NFR BLD: 6.8 % (ref 4–15)
NEUTROPHILS # BLD AUTO: 4.5 K/UL (ref 1.8–7.7)
NEUTROPHILS NFR BLD: 63.8 % (ref 38–73)
NITRITE UR QL STRIP: NEGATIVE
NRBC BLD-RTO: 0 /100 WBC
OHS QRS DURATION: 94 MS
OHS QTC CALCULATION: 448 MS
PH UR STRIP: 7 [PH] (ref 5–8)
PLATELET # BLD AUTO: 349 K/UL (ref 150–450)
PMV BLD AUTO: 9 FL (ref 9.2–12.9)
POTASSIUM SERPL-SCNC: 4.3 MMOL/L (ref 3.5–5.1)
PROT SERPL-MCNC: 7.7 G/DL (ref 6–8.4)
PROT UR QL STRIP: NEGATIVE
RBC # BLD AUTO: 4.96 M/UL (ref 4–5.4)
RBC #/AREA URNS HPF: 0 /HPF (ref 0–4)
SODIUM SERPL-SCNC: 140 MMOL/L (ref 136–145)
SP GR UR STRIP: 1.01 (ref 1–1.03)
TROPONIN I SERPL DL<=0.01 NG/ML-MCNC: <0.006 NG/ML (ref 0–0.03)
TSH SERPL DL<=0.005 MIU/L-ACNC: 0.42 UIU/ML (ref 0.4–4)
URN SPEC COLLECT METH UR: ABNORMAL
UROBILINOGEN UR STRIP-ACNC: NEGATIVE EU/DL
WBC # BLD AUTO: 7.08 K/UL (ref 3.9–12.7)
WBC #/AREA URNS HPF: 2 /HPF (ref 0–5)

## 2024-05-14 PROCEDURE — 80053 COMPREHEN METABOLIC PANEL: CPT | Performed by: NURSE PRACTITIONER

## 2024-05-14 PROCEDURE — 84443 ASSAY THYROID STIM HORMONE: CPT | Performed by: NURSE PRACTITIONER

## 2024-05-14 PROCEDURE — 83880 ASSAY OF NATRIURETIC PEPTIDE: CPT | Performed by: NURSE PRACTITIONER

## 2024-05-14 PROCEDURE — 93005 ELECTROCARDIOGRAM TRACING: CPT

## 2024-05-14 PROCEDURE — 83690 ASSAY OF LIPASE: CPT | Performed by: NURSE PRACTITIONER

## 2024-05-14 PROCEDURE — 81000 URINALYSIS NONAUTO W/SCOPE: CPT | Performed by: NURSE PRACTITIONER

## 2024-05-14 PROCEDURE — 85025 COMPLETE CBC W/AUTO DIFF WBC: CPT | Performed by: NURSE PRACTITIONER

## 2024-05-14 PROCEDURE — 93010 ELECTROCARDIOGRAM REPORT: CPT | Mod: ,,, | Performed by: INTERNAL MEDICINE

## 2024-05-14 PROCEDURE — 84484 ASSAY OF TROPONIN QUANT: CPT | Performed by: NURSE PRACTITIONER

## 2024-05-14 PROCEDURE — 85379 FIBRIN DEGRADATION QUANT: CPT | Performed by: NURSE PRACTITIONER

## 2024-05-14 PROCEDURE — 25000003 PHARM REV CODE 250: Performed by: NURSE PRACTITIONER

## 2024-05-14 PROCEDURE — 81025 URINE PREGNANCY TEST: CPT | Performed by: NURSE PRACTITIONER

## 2024-05-14 PROCEDURE — 99285 EMERGENCY DEPT VISIT HI MDM: CPT | Mod: 25

## 2024-05-14 RX ORDER — ASPIRIN 325 MG
325 TABLET ORAL
Status: COMPLETED | OUTPATIENT
Start: 2024-05-14 | End: 2024-05-14

## 2024-05-14 RX ADMIN — ASPIRIN 325 MG ORAL TABLET 325 MG: 325 PILL ORAL at 10:05

## 2024-05-14 NOTE — Clinical Note
"Zheng"Araceli Beverly was seen and treated in our emergency department on 5/14/2024.  She may return to work on 05/16/2024.       If you have any questions or concerns, please don't hesitate to call.      Libby Britton MD"

## 2024-05-14 NOTE — DISCHARGE INSTRUCTIONS
Please follow-up with cardiology as previously scheduled   If you develop any new worrisome symptoms please return to the emergency room

## 2024-05-14 NOTE — ED PROVIDER NOTES
Encounter Date: 2024       History     Chief Complaint   Patient presents with    Chest Pain     Chief complaint: Chest pain   28-year-old female with no significant medical history presents to be evaluated for intermittent chest pain for the last 3 weeks.  She reports she was seen by Cardiology 2 weeks ago as a stress test scheduled for next month.  She reports she has had daily intermittent bouts of dull to sharp left-sided chest pain that lasts minutes and spontaneously resolved.  Denies shortness of breath palpitations or cough.  She denies use of caffeine or stimulants.  She reports that she previously vague but has quit.  Currently states she has 6/10 dull left-sided chest pain.      Review of patient's allergies indicates:  No Known Allergies  History reviewed. No pertinent past medical history.  Past Surgical History:   Procedure Laterality Date     SECTION       No family history on file.  Social History     Tobacco Use    Smoking status: Never    Smokeless tobacco: Never     Review of Systems   HENT:  Negative for congestion and sore throat.    Respiratory:  Negative for cough, chest tightness and shortness of breath.    Cardiovascular:  Positive for chest pain. Negative for palpitations and leg swelling.   Gastrointestinal:  Negative for abdominal pain.       Physical Exam     Initial Vitals [24 1009]   BP Pulse Resp Temp SpO2   (!) 144/90 69 20 98 °F (36.7 °C) 98 %      MAP       --         Physical Exam    Nursing note and vitals reviewed.  Constitutional: She appears well-developed and well-nourished.   HENT:   Head: Normocephalic and atraumatic.   Cardiovascular:  Normal rate and regular rhythm.     Exam reveals no gallop and no friction rub.       No murmur heard.  Pulmonary/Chest: Breath sounds normal. She has no wheezes. She has no rhonchi. She has no rales.     Skin: Skin is warm and dry.   Psychiatric: She has a normal mood and affect. Thought content normal.         ED Course    Procedures  Labs Reviewed   CBC W/ AUTO DIFFERENTIAL - Abnormal; Notable for the following components:       Result Value    MCH 26.0 (*)     MCHC 31.6 (*)     RDW 15.2 (*)     MPV 9.0 (*)     All other components within normal limits   COMPREHENSIVE METABOLIC PANEL - Abnormal; Notable for the following components:    Alkaline Phosphatase 50 (*)     All other components within normal limits   URINALYSIS, REFLEX TO URINE CULTURE - Abnormal; Notable for the following components:    Leukocytes, UA Trace (*)     All other components within normal limits    Narrative:     Specimen Source->Urine   URINALYSIS MICROSCOPIC - Abnormal; Notable for the following components:    Bacteria Few (*)     All other components within normal limits    Narrative:     Specimen Source->Urine   B-TYPE NATRIURETIC PEPTIDE   D DIMER, QUANTITATIVE   TROPONIN I   TSH   LIPASE   PREGNANCY TEST, URINE RAPID    Narrative:     Specimen Source->Urine        ECG Results              EKG 12-lead (Final result)        Collection Time Result Time QRS Duration OHS QTC Calculation    05/14/24 10:13:54 05/14/24 11:44:28 94 448                     Final result by Interface, Lab In [facility] (05/14/24 11:44:37)                   Narrative:    Test Reason : R07.9    Vent. Rate : 072 BPM     Atrial Rate : 072 BPM     P-R Int : 138 ms          QRS Dur : 094 ms      QT Int : 410 ms       P-R-T Axes : 049 -13 001 degrees     QTc Int : 448 ms    Normal sinus rhythm  Nonspecific ST abnormality  Abnormal ECG  No previous ECGs available  Confirmed by Montez MANZANO, Paras BATISTA ([id number]) on 5/14/2024 11:44:26 AM    Referred By: [email or web address]   SELF           Confirmed By:Paras Herrmann MD                      Wet Read by Libby Britton MD (05/14/24 10:14:33, [facility] - Emergency Dept, Emergency Medicine)    NSR, HR 72, normal axis, no specific ST changes, QTc 448, no STEMI.  Independently interpreted in absence of cardiology                                  Imaging Results               X-Ray Chest AP Portable (Final result)  Result time 05/14/24 10:50:55      Final result by Irene Yang MD (05/14/24 10:50:55)                   Impression:      No acute abnormality.      Electronically signed by: Irene Yang MD  Date:    05/14/2024  Time:    10:50               Narrative:    EXAMINATION:  XR CHEST AP PORTABLE    CLINICAL HISTORY:  Chest pain, unspecified    TECHNIQUE:  Single frontal view of the chest was performed.    COMPARISON:  None    FINDINGS:  The lungs are clear with normal appearance of pulmonary vasculature. No pleural effusion. No evident pneumothorax.    The cardiac silhouette is upper limits normal in size..  The hilar and mediastinal contours are unremarkable.    Bones are intact.                                       Medications   aspirin tablet 325 mg (325 mg Oral Given 5/14/24 1029)     Medical Decision Making  28 year old female with a three week history of intermittent chest pain. She was initially seen by cardiologist and is scheduled to follow up for a stress.  Differential diagnosis ACS, NSTEMI, STEMI, costochondritis, chest wall pain, PE    Amount and/or Complexity of Data Reviewed  Labs: ordered.     Details: Cbc, cmp, bnp, ddimer, trop urinalysis  Radiology: ordered.     Details: Chest xray    Risk  OTC drugs.  Risk Details: Patient with normal cardiac exam 80 today  Negative cardiac enzymes normal BNP normal D-dimer   No acute findings today in ED  Patient is stable for DC with follow-up with Cardiology                                      Clinical Impression:  Final diagnoses:  [R07.9] Chest pain  [R07.89] Atypical chest pain (Primary)          ED Disposition Condition    Discharge Stable          ED Prescriptions    None       Follow-up Information    None          Hayley Saldana NP  05/14/24 1902

## 2024-05-14 NOTE — ED TRIAGE NOTES
C/o intermittent chest pain that is sometimes to the left chest and at times on the right upper chest x 3 weeks. Patient was seen by a cardiologist and has a stress test scheduled for June 6, 2024.

## 2024-06-03 ENCOUNTER — HOSPITAL ENCOUNTER (EMERGENCY)
Facility: HOSPITAL | Age: 29
Discharge: HOME | End: 2024-06-03
Attending: STUDENT IN AN ORGANIZED HEALTH CARE EDUCATION/TRAINING PROGRAM
Payer: COMMERCIAL

## 2024-06-03 VITALS
WEIGHT: 152.44 LBS | SYSTOLIC BLOOD PRESSURE: 119 MMHG | OXYGEN SATURATION: 98 % | HEART RATE: 95 BPM | TEMPERATURE: 98 F | DIASTOLIC BLOOD PRESSURE: 68 MMHG | RESPIRATION RATE: 20 BRPM | BODY MASS INDEX: 30.73 KG/M2 | HEIGHT: 59 IN

## 2024-06-03 DIAGNOSIS — R10.9 LEFT FLANK PAIN: Primary | ICD-10-CM

## 2024-06-03 DIAGNOSIS — N39.0 URINARY TRACT INFECTION WITHOUT HEMATURIA, SITE UNSPECIFIED: ICD-10-CM

## 2024-06-03 LAB
B-HCG UR QL: NEGATIVE
BACTERIA #/AREA URNS HPF: ABNORMAL /HPF
BILIRUB UR QL STRIP: NEGATIVE
CLARITY UR: CLEAR
COLOR UR: YELLOW
GLUCOSE UR QL STRIP: NEGATIVE
HGB UR QL STRIP: ABNORMAL
HYALINE CASTS #/AREA URNS LPF: 0 /LPF
KETONES UR QL STRIP: NEGATIVE
LEUKOCYTE ESTERASE UR QL STRIP: ABNORMAL
MICROSCOPIC COMMENT: ABNORMAL
NITRITE UR QL STRIP: POSITIVE
PH UR STRIP: 7 [PH] (ref 5–8)
PROT UR QL STRIP: ABNORMAL
RBC #/AREA URNS HPF: 0 /HPF (ref 0–4)
SP GR UR STRIP: 1.02 (ref 1–1.03)
SQUAMOUS #/AREA URNS HPF: 2 /HPF
URN SPEC COLLECT METH UR: ABNORMAL
UROBILINOGEN UR STRIP-ACNC: NEGATIVE EU/DL
WBC #/AREA URNS HPF: 18 /HPF (ref 0–5)

## 2024-06-03 PROCEDURE — 87186 SC STD MICRODIL/AGAR DIL: CPT | Performed by: STUDENT IN AN ORGANIZED HEALTH CARE EDUCATION/TRAINING PROGRAM

## 2024-06-03 PROCEDURE — 81025 URINE PREGNANCY TEST: CPT | Performed by: STUDENT IN AN ORGANIZED HEALTH CARE EDUCATION/TRAINING PROGRAM

## 2024-06-03 PROCEDURE — 87077 CULTURE AEROBIC IDENTIFY: CPT | Performed by: STUDENT IN AN ORGANIZED HEALTH CARE EDUCATION/TRAINING PROGRAM

## 2024-06-03 PROCEDURE — 81000 URINALYSIS NONAUTO W/SCOPE: CPT | Performed by: STUDENT IN AN ORGANIZED HEALTH CARE EDUCATION/TRAINING PROGRAM

## 2024-06-03 PROCEDURE — 99285 EMERGENCY DEPT VISIT HI MDM: CPT | Mod: 25

## 2024-06-03 PROCEDURE — 87088 URINE BACTERIA CULTURE: CPT | Performed by: STUDENT IN AN ORGANIZED HEALTH CARE EDUCATION/TRAINING PROGRAM

## 2024-06-03 PROCEDURE — 87086 URINE CULTURE/COLONY COUNT: CPT | Performed by: STUDENT IN AN ORGANIZED HEALTH CARE EDUCATION/TRAINING PROGRAM

## 2024-06-03 PROCEDURE — 25000003 PHARM REV CODE 250: Performed by: STUDENT IN AN ORGANIZED HEALTH CARE EDUCATION/TRAINING PROGRAM

## 2024-06-03 RX ORDER — CIPROFLOXACIN 500 MG/1
500 TABLET ORAL
Status: COMPLETED | OUTPATIENT
Start: 2024-06-03 | End: 2024-06-03

## 2024-06-03 RX ORDER — CIPROFLOXACIN 500 MG/1
500 TABLET ORAL 2 TIMES DAILY
Qty: 20 TABLET | Refills: 0 | Status: ON HOLD | OUTPATIENT
Start: 2024-06-03 | End: 2024-06-10 | Stop reason: HOSPADM

## 2024-06-03 RX ADMIN — CIPROFLOXACIN 500 MG: 500 TABLET ORAL at 09:06

## 2024-06-03 NOTE — ED PROVIDER NOTES
Encounter Date: 6/3/2024       History     Chief Complaint   Patient presents with    Abdominal Pain    Back Pain     28-year-old female with history of prior UTIs and kidney stones presenting with four days of dysuria, hematuria, left flank pain.  Patient was diagnosed with a UTI at urgent care, placed on Bactrim, however states the pain has worsened, and the hematuria has not improved.  No fever, nausea, vomiting, diarrhea.  No other complaints.      Review of patient's allergies indicates:  No Known Allergies  History reviewed. No pertinent past medical history.  Past Surgical History:   Procedure Laterality Date     SECTION       No family history on file.  Social History     Tobacco Use    Smoking status: Never    Smokeless tobacco: Never     Review of Systems   Constitutional:  Negative for fever.   HENT:  Negative for sore throat.    Respiratory:  Negative for shortness of breath.    Cardiovascular:  Negative for chest pain.   Gastrointestinal:  Negative for nausea.   Genitourinary:  Positive for dysuria, flank pain and hematuria.   Musculoskeletal:  Negative for back pain.   Skin:  Negative for rash.   Neurological:  Negative for weakness.   Hematological:  Does not bruise/bleed easily.       Physical Exam     Initial Vitals [24 0856]   BP Pulse Resp Temp SpO2   132/80 95 18 98.2 °F (36.8 °C) 97 %      MAP       --         Physical Exam    Nursing note and vitals reviewed.  Constitutional: She appears well-developed.   HENT:   Head: Normocephalic.   Eyes: Pupils are equal, round, and reactive to light.   Neck:   Normal range of motion.  Cardiovascular:            No murmur heard.  Pulmonary/Chest: No respiratory distress.   Abdominal: Abdomen is soft.   No lower abdominal tenderness to palpation.  Patient has positive left CVA tenderness, no right CVA tenderness.  No rebound or guarding.   Musculoskeletal:         General: No edema.      Cervical back: Normal range of motion.     Neurological:  She is alert.   Skin: Skin is warm.   Psychiatric: She has a normal mood and affect.         ED Course   Procedures  Labs Reviewed   URINALYSIS, REFLEX TO URINE CULTURE   PREGNANCY TEST, URINE RAPID          Imaging Results    None          Medications - No data to display  Medical Decision Making  DDX: Possible kidney stone. R/o UTI/pyelonephritis, infected stone. Less likely appendicitis/diverticulitis given benign abdomen, but will be screened. Otherwise likely muscular strain.  DX: UA.  Upreg.  CTAP w/o contrast. Consider labs if large stone to assess for JOELLEN.  TX: Analgesia, antiemetic PRN. IVF. Treatment/consult as indicated by studies.  DISPO: Pending studies, reassessment. If studies WNL or stable for outpatient management, symptoms controlled, discharge to follow up with PMD +/- urology within 1 week with supportive care recommendations and RTED precautions.        Amount and/or Complexity of Data Reviewed  Radiology: ordered.                                      Clinical Impression:  Final diagnoses:  [R10.9] Left flank pain (Primary)                 Wesly Alcaraz MD  06/03/24 0916

## 2024-06-03 NOTE — ED TRIAGE NOTES
C/o left upper abdominal pain and left lower back pain since Friday. Patient seen at Urgent Care Friday and prescribed Bactrim for a UTI, but symptoms are getting worse. Patient took Tylenol pta.

## 2024-06-05 LAB — BACTERIA UR CULT: ABNORMAL

## 2024-06-07 ENCOUNTER — HOSPITAL ENCOUNTER (INPATIENT)
Facility: HOSPITAL | Age: 29
LOS: 3 days | Discharge: HOME | DRG: 690 | End: 2024-06-10
Attending: STUDENT IN AN ORGANIZED HEALTH CARE EDUCATION/TRAINING PROGRAM | Admitting: FAMILY MEDICINE
Payer: COMMERCIAL

## 2024-06-07 DIAGNOSIS — R10.9 LEFT FLANK PAIN: Primary | ICD-10-CM

## 2024-06-07 DIAGNOSIS — N12 PYELONEPHRITIS: ICD-10-CM

## 2024-06-07 LAB
ALBUMIN SERPL BCP-MCNC: 4.1 G/DL (ref 3.5–5.2)
ALP SERPL-CCNC: 58 U/L (ref 55–135)
ALT SERPL W/O P-5'-P-CCNC: 44 U/L (ref 10–44)
ANION GAP SERPL CALC-SCNC: 11 MMOL/L (ref 8–16)
AST SERPL-CCNC: 18 U/L (ref 10–40)
B-HCG UR QL: NEGATIVE
BACTERIA #/AREA URNS HPF: ABNORMAL /HPF
BASOPHILS # BLD AUTO: 0.03 K/UL (ref 0–0.2)
BASOPHILS NFR BLD: 0.5 % (ref 0–1.9)
BILIRUB SERPL-MCNC: 0.3 MG/DL (ref 0.1–1)
BILIRUB UR QL STRIP: NEGATIVE
BUN SERPL-MCNC: 8 MG/DL (ref 6–20)
CALCIUM SERPL-MCNC: 10 MG/DL (ref 8.7–10.5)
CHLORIDE SERPL-SCNC: 105 MMOL/L (ref 95–110)
CLARITY UR: CLEAR
CO2 SERPL-SCNC: 22 MMOL/L (ref 23–29)
COLOR UR: YELLOW
CREAT SERPL-MCNC: 0.9 MG/DL (ref 0.5–1.4)
DIFFERENTIAL METHOD BLD: ABNORMAL
EOSINOPHIL # BLD AUTO: 0.1 K/UL (ref 0–0.5)
EOSINOPHIL NFR BLD: 2.1 % (ref 0–8)
ERYTHROCYTE [DISTWIDTH] IN BLOOD BY AUTOMATED COUNT: 13.8 % (ref 11.5–14.5)
EST. GFR  (NO RACE VARIABLE): >60 ML/MIN/1.73 M^2
GLUCOSE SERPL-MCNC: 90 MG/DL (ref 70–110)
GLUCOSE UR QL STRIP: NEGATIVE
HCT VFR BLD AUTO: 36.8 % (ref 37–48.5)
HGB BLD-MCNC: 12.2 G/DL (ref 12–16)
HGB UR QL STRIP: NEGATIVE
HYALINE CASTS #/AREA URNS LPF: 0 /LPF
IMM GRANULOCYTES # BLD AUTO: 0.05 K/UL (ref 0–0.04)
IMM GRANULOCYTES NFR BLD AUTO: 0.9 % (ref 0–0.5)
KETONES UR QL STRIP: NEGATIVE
LACTATE SERPL-SCNC: 1.6 MMOL/L (ref 0.5–2.2)
LEUKOCYTE ESTERASE UR QL STRIP: ABNORMAL
LIPASE SERPL-CCNC: 13 U/L (ref 4–60)
LYMPHOCYTES # BLD AUTO: 0.4 K/UL (ref 1–4.8)
LYMPHOCYTES NFR BLD: 6.4 % (ref 18–48)
MCH RBC QN AUTO: 26.2 PG (ref 27–31)
MCHC RBC AUTO-ENTMCNC: 33.2 G/DL (ref 32–36)
MCV RBC AUTO: 79 FL (ref 82–98)
MICROSCOPIC COMMENT: ABNORMAL
MONOCYTES # BLD AUTO: 0.4 K/UL (ref 0.3–1)
MONOCYTES NFR BLD: 7.3 % (ref 4–15)
NEUTROPHILS # BLD AUTO: 4.8 K/UL (ref 1.8–7.7)
NEUTROPHILS NFR BLD: 82.8 % (ref 38–73)
NITRITE UR QL STRIP: NEGATIVE
NRBC BLD-RTO: 0 /100 WBC
PH UR STRIP: 6 [PH] (ref 5–8)
PLATELET # BLD AUTO: 284 K/UL (ref 150–450)
PMV BLD AUTO: 9.2 FL (ref 9.2–12.9)
POTASSIUM SERPL-SCNC: 4 MMOL/L (ref 3.5–5.1)
PROT SERPL-MCNC: 7.9 G/DL (ref 6–8.4)
PROT UR QL STRIP: NEGATIVE
RBC # BLD AUTO: 4.65 M/UL (ref 4–5.4)
RBC #/AREA URNS HPF: 0 /HPF (ref 0–4)
SODIUM SERPL-SCNC: 138 MMOL/L (ref 136–145)
SP GR UR STRIP: 1.01 (ref 1–1.03)
URN SPEC COLLECT METH UR: ABNORMAL
UROBILINOGEN UR STRIP-ACNC: NEGATIVE EU/DL
WBC # BLD AUTO: 5.75 K/UL (ref 3.9–12.7)
WBC #/AREA URNS HPF: 10 /HPF (ref 0–5)

## 2024-06-07 PROCEDURE — 99900031 HC PATIENT EDUCATION (STAT)

## 2024-06-07 PROCEDURE — 27000207 HC ISOLATION

## 2024-06-07 PROCEDURE — 25000003 PHARM REV CODE 250: Performed by: STUDENT IN AN ORGANIZED HEALTH CARE EDUCATION/TRAINING PROGRAM

## 2024-06-07 PROCEDURE — 99222 1ST HOSP IP/OBS MODERATE 55: CPT | Mod: ,,, | Performed by: PHYSICIAN ASSISTANT

## 2024-06-07 PROCEDURE — 81000 URINALYSIS NONAUTO W/SCOPE: CPT | Performed by: STUDENT IN AN ORGANIZED HEALTH CARE EDUCATION/TRAINING PROGRAM

## 2024-06-07 PROCEDURE — 63600175 PHARM REV CODE 636 W HCPCS: Performed by: STUDENT IN AN ORGANIZED HEALTH CARE EDUCATION/TRAINING PROGRAM

## 2024-06-07 PROCEDURE — 83605 ASSAY OF LACTIC ACID: CPT | Performed by: STUDENT IN AN ORGANIZED HEALTH CARE EDUCATION/TRAINING PROGRAM

## 2024-06-07 PROCEDURE — 99900035 HC TECH TIME PER 15 MIN (STAT)

## 2024-06-07 PROCEDURE — 63600175 PHARM REV CODE 636 W HCPCS: Performed by: FAMILY MEDICINE

## 2024-06-07 PROCEDURE — 81025 URINE PREGNANCY TEST: CPT | Performed by: STUDENT IN AN ORGANIZED HEALTH CARE EDUCATION/TRAINING PROGRAM

## 2024-06-07 PROCEDURE — 96374 THER/PROPH/DIAG INJ IV PUSH: CPT

## 2024-06-07 PROCEDURE — 83690 ASSAY OF LIPASE: CPT | Performed by: STUDENT IN AN ORGANIZED HEALTH CARE EDUCATION/TRAINING PROGRAM

## 2024-06-07 PROCEDURE — 11000001 HC ACUTE MED/SURG PRIVATE ROOM

## 2024-06-07 PROCEDURE — 99285 EMERGENCY DEPT VISIT HI MDM: CPT

## 2024-06-07 PROCEDURE — 99221 1ST HOSP IP/OBS SF/LOW 40: CPT | Mod: ,,, | Performed by: FAMILY MEDICINE

## 2024-06-07 PROCEDURE — 25000003 PHARM REV CODE 250: Performed by: FAMILY MEDICINE

## 2024-06-07 PROCEDURE — 85025 COMPLETE CBC W/AUTO DIFF WBC: CPT | Performed by: STUDENT IN AN ORGANIZED HEALTH CARE EDUCATION/TRAINING PROGRAM

## 2024-06-07 PROCEDURE — 94760 N-INVAS EAR/PLS OXIMETRY 1: CPT

## 2024-06-07 PROCEDURE — 87040 BLOOD CULTURE FOR BACTERIA: CPT | Mod: 59 | Performed by: STUDENT IN AN ORGANIZED HEALTH CARE EDUCATION/TRAINING PROGRAM

## 2024-06-07 PROCEDURE — 80053 COMPREHEN METABOLIC PANEL: CPT | Performed by: STUDENT IN AN ORGANIZED HEALTH CARE EDUCATION/TRAINING PROGRAM

## 2024-06-07 RX ORDER — KETOROLAC TROMETHAMINE 30 MG/ML
15 INJECTION, SOLUTION INTRAMUSCULAR; INTRAVENOUS
Status: COMPLETED | OUTPATIENT
Start: 2024-06-07 | End: 2024-06-07

## 2024-06-07 RX ORDER — MORPHINE SULFATE 2 MG/ML
4 INJECTION, SOLUTION INTRAMUSCULAR; INTRAVENOUS
Status: COMPLETED | OUTPATIENT
Start: 2024-06-07 | End: 2024-06-07

## 2024-06-07 RX ORDER — ACETAMINOPHEN 325 MG/1
650 TABLET ORAL EVERY 6 HOURS PRN
Status: DISCONTINUED | OUTPATIENT
Start: 2024-06-07 | End: 2024-06-09

## 2024-06-07 RX ORDER — ACETAMINOPHEN 500 MG
500 TABLET ORAL EVERY 6 HOURS PRN
Status: ON HOLD | COMMUNITY
End: 2024-06-10 | Stop reason: HOSPADM

## 2024-06-07 RX ORDER — KETOROLAC TROMETHAMINE 15 MG/ML
15 INJECTION, SOLUTION INTRAMUSCULAR; INTRAVENOUS EVERY 6 HOURS PRN
Status: DISCONTINUED | OUTPATIENT
Start: 2024-06-07 | End: 2024-06-09

## 2024-06-07 RX ORDER — TALC
6 POWDER (GRAM) TOPICAL NIGHTLY PRN
Status: DISCONTINUED | OUTPATIENT
Start: 2024-06-07 | End: 2024-06-10 | Stop reason: HOSPADM

## 2024-06-07 RX ORDER — SODIUM CHLORIDE 0.9 % (FLUSH) 0.9 %
10 SYRINGE (ML) INJECTION
Status: DISCONTINUED | OUTPATIENT
Start: 2024-06-07 | End: 2024-06-10 | Stop reason: HOSPADM

## 2024-06-07 RX ORDER — PERPHENAZINE/AMITRIPTYLINE HCL 2 MG-25 MG
1 TABLET ORAL DAILY
Status: ON HOLD | COMMUNITY
End: 2024-06-10 | Stop reason: HOSPADM

## 2024-06-07 RX ORDER — SULFAMETHOXAZOLE AND TRIMETHOPRIM 800; 160 MG/1; MG/1
1 TABLET ORAL EVERY 12 HOURS
COMMUNITY
Start: 2024-06-02 | End: 2024-06-07

## 2024-06-07 RX ADMIN — MEROPENEM 500 MG: 500 INJECTION, POWDER, FOR SOLUTION INTRAVENOUS at 01:06

## 2024-06-07 RX ADMIN — KETOROLAC TROMETHAMINE 15 MG: 15 INJECTION, SOLUTION INTRAMUSCULAR; INTRAVENOUS at 05:06

## 2024-06-07 RX ADMIN — Medication 6 MG: at 09:06

## 2024-06-07 RX ADMIN — ACETAMINOPHEN 650 MG: 325 TABLET ORAL at 05:06

## 2024-06-07 RX ADMIN — KETOROLAC TROMETHAMINE 15 MG: 30 INJECTION, SOLUTION INTRAMUSCULAR; INTRAVENOUS at 11:06

## 2024-06-07 RX ADMIN — MEROPENEM 500 MG: 500 INJECTION, POWDER, FOR SOLUTION INTRAVENOUS at 09:06

## 2024-06-07 RX ADMIN — MORPHINE SULFATE 4 MG: 2 INJECTION, SOLUTION INTRAMUSCULAR; INTRAVENOUS at 01:06

## 2024-06-07 NOTE — ED PROVIDER NOTES
Encounter Date: 2024       History     Chief Complaint   Patient presents with    Back Pain     28-year-old female with no medical history presenting with left flank pain.  Patient was seen by me a couple of days ago, diagnosed with urinary tract infection and treated with antibiotics.  She reports that since then she is developed fever, no pain has worsened.  Denies any significant dysuria.  Denies hematuria.  Reports that her left flank pain also radiates around to her back.  No nausea or vomiting or diarrhea.  No chest pain or shortness breath.  Patient took Tylenol 3 hours ago.      Review of patient's allergies indicates:  No Known Allergies  History reviewed. No pertinent past medical history.  Past Surgical History:   Procedure Laterality Date     SECTION       No family history on file.  Social History     Tobacco Use    Smoking status: Never    Smokeless tobacco: Never     Review of Systems   Constitutional:  Positive for fever.   HENT:  Negative for sore throat.    Respiratory:  Negative for shortness of breath.    Cardiovascular:  Negative for chest pain.   Gastrointestinal:  Negative for abdominal pain, diarrhea, nausea and vomiting.   Genitourinary:  Positive for flank pain. Negative for dysuria.   Musculoskeletal:  Negative for back pain.   Skin:  Negative for rash.   Neurological:  Negative for weakness.   Hematological:  Does not bruise/bleed easily.       Physical Exam     Initial Vitals [24 1133]   BP Pulse Resp Temp SpO2   135/84 (!) 118 18 99.7 °F (37.6 °C) 97 %      MAP       --         Physical Exam    Nursing note and vitals reviewed.  Constitutional: She appears well-developed.   HENT:   Head: Normocephalic.   Eyes: Pupils are equal, round, and reactive to light.   Neck:   Normal range of motion.  Cardiovascular:            No murmur heard.  Pulmonary/Chest: No respiratory distress.   Abdominal: Abdomen is soft.   No TTP diffusely. No guarding, rebound, or masses. Negative  Hunter's sign. No TTP at McBurney's point.  Positive left CVA tenderness.  No right CVA tenderness.   Musculoskeletal:         General: No edema.      Cervical back: Normal range of motion.     Neurological: She is alert.   Skin: Skin is warm.   Psychiatric: She has a normal mood and affect.         ED Course   Procedures  Labs Reviewed   CBC W/ AUTO DIFFERENTIAL - Abnormal; Notable for the following components:       Result Value    Hematocrit 36.8 (*)     MCV 79 (*)     MCH 26.2 (*)     Immature Granulocytes 0.9 (*)     Immature Grans (Abs) 0.05 (*)     Lymph # 0.4 (*)     Gran % 82.8 (*)     Lymph % 6.4 (*)     All other components within normal limits   COMPREHENSIVE METABOLIC PANEL - Abnormal; Notable for the following components:    CO2 22 (*)     All other components within normal limits   URINALYSIS, REFLEX TO URINE CULTURE - Abnormal; Notable for the following components:    Leukocytes, UA Trace (*)     All other components within normal limits    Narrative:     Specimen Source->Urine   URINALYSIS MICROSCOPIC - Abnormal; Notable for the following components:    WBC, UA 10 (*)     Bacteria Moderate (*)     All other components within normal limits    Narrative:     Specimen Source->Urine   CULTURE, BLOOD   CULTURE, BLOOD   LACTIC ACID, PLASMA   LIPASE   PREGNANCY TEST, URINE RAPID   PREGNANCY TEST, URINE RAPID    Narrative:     Specimen Source->Urine          Imaging Results    None          Medications   meropenem (MERREM) 500 mg in sodium chloride 0.9 % 100 mL IVPB (MB+) (0 mg Intravenous Stopped 6/7/24 1417)   sodium chloride 0.9% flush 10 mL (has no administration in time range)   melatonin tablet 6 mg (has no administration in time range)   ketorolac injection 15 mg (15 mg Intravenous Given 6/7/24 1158)   morphine injection 4 mg (4 mg Intravenous Given 6/7/24 1325)     Medical Decision Making  DDX:  Flank pain.  Patient had CT scan previously that showed no kidney stone.  Possible pyelonephritis versus  other acute intra-abdominal pathology. R/o acute cholecystitis, biliary colic, pancreatitis. Less likely cholangitis, choledocholithiasis but will screen on LFTs. Unlikely appendicitis, diverticulitis, SBO given history, no low abdominal TTP.  DX: BMP, CBC, LFT, lipase.  Lactate.  UA. Upreg.  TX: Analgesia, antiemetic PRN. Treatment/consult as indicated by studies.  DISPO: Pending studies. If studies WNL, symptoms controlled, likely discharge to f/u with PMD within 2 days with precautions for return and recommendations for supportive care.          Amount and/or Complexity of Data Reviewed  Labs: ordered. Decision-making details documented in ED Course.    Risk  OTC drugs.  Prescription drug management.  Decision regarding hospitalization.               ED Course as of 06/07/24 1548   Fri Jun 07, 2024   1215 WBC: 5.75 [NB]      ED Course User Index  [NB] Wesly Alcaraz MD                           Clinical Impression:  Final diagnoses:  [R10.9] Left flank pain (Primary)  [N12] Pyelonephritis          ED Disposition Condition    Admit Stable                Wesly Alcaraz MD  06/07/24 1144       Wesly Alcaraz MD  06/07/24 1548

## 2024-06-07 NOTE — NURSING
Patient's fiance and small children here to visit with patient. Discussed contact precautions and risks of transmitting ESBL. Encouraged proper precautions, hand hygiene, and limited visitation to minimize exposure.

## 2024-06-07 NOTE — HPI
Patient is a 28 year old female who presented to the ED with severe left flank pain.  She was diagnosed with UTI/pyelonephritis several days ago and received cipro.  CT renal stone study showed perinephric stranding of the left kidney at that time.  Final urinary culture positive for ESBL Ecoli not sensitive to cipro.  She has had fever but denies CP, SOB, nausea and vomiting.  She has had multiple UTIs growing up and when she was a teenager she saw a urologist and was told she doesn't empty her bladder completely.  She has an appt with Dr. Belcher for this upcoming Tuesday         Admitted for MDR pyelonephritis.

## 2024-06-07 NOTE — ED TRIAGE NOTES
28 y.o. female presents to ER Room/bed info not found   Chief Complaint   Patient presents with    Back Pain   .   C/o back pain and fever, reports seen here three days and dx'd with kidney infection, started on antibiotics but still with pain and started running fever

## 2024-06-07 NOTE — ASSESSMENT & PLAN NOTE
Recent CT scan with perinephric stranding  IV meropenem based on recent urine culture ecoli ESBL  Pain control   Pt has a urology apt on Tuesday

## 2024-06-07 NOTE — H&P
[facility] Medicine  History & Physical    Patient Name: Zheng Beverly  MRN: [id number]  Patient Class: IP- Inpatient  Admission Date: 2024  Attending Physician: Farhat Cannon MD   Primary Care Provider: Sloane Kimbrough FNP         Patient information was obtained from patient and ER records.     Subjective:     Principal Problem:Pyelonephritis    Chief Complaint:   Chief Complaint   Patient presents with    Back Pain        HPI: Patient is a 28 year old female who presented to the ED with severe left flank pain.  She was diagnosed with UTI/pyelonephritis several days ago and received cipro.  CT renal stone study showed perinephric stranding of the left kidney at that time.  Final urinary culture positive for ESBL Ecoli not sensitive to cipro.  She has had fever but denies CP, SOB, nausea and vomiting.  She has had multiple UTIs growing up and when she was a teenager she saw a urologist and was told she doesn't empty her bladder completely.        Admitted for MDR pyelonephritis.      History reviewed. No pertinent past medical history.    Past Surgical History:   Procedure Laterality Date     SECTION         Review of patient's allergies indicates:  No Known Allergies    No current facility-administered medications on file prior to encounter.     Current Outpatient Medications on File Prior to Encounter   Medication Sig    acetaminophen (TYLENOL) 500 MG tablet Take 500 mg by mouth every 6 (six) hours as needed for Pain.    ciprofloxacin HCl (CIPRO) 500 MG tablet Take 1 tablet (500 mg total) by mouth 2 (two) times daily. for 10 days    multivit-iron-FA-calcium-mins (WOMEN'S DAILY FORMULA) 18 mg iron-400 mcg-500 mg Ca Tab Take 1 tablet by mouth once daily.    [DISCONTINUED] sulfamethoxazole-trimethoprim 800-160mg (BACTRIM DS) 800-160 mg Tab Take 1 tablet by mouth every 12 (twelve) hours.    [DISCONTINUED] aspirin (ECOTRIN) 81 MG EC tablet Take 1 tablet (81 mg total) by mouth  once daily.    [DISCONTINUED] LO LOESTRIN FE 1 mg-10 mcg (24)/10 mcg (2) Tab Take 1 tablet by mouth once daily.    [DISCONTINUED] NIFEdipine (PROCARDIA-XL) 30 MG (OSM) 24 hr tablet TAKE 1 TABLET BY MOUTH EVERY DAY    [DISCONTINUED] prenatal vit103-iron fum-folic () 27 mg iron- 1 mg Tab Take 1 tablet by mouth once daily.    [DISCONTINUED] ursodioL (ACTIGALL) 300 mg capsule Take 300 mg by mouth 2 (two) times daily.    [DISCONTINUED] valACYclovir (VALTREX) 1000 MG tablet TAKE 2 TABLETS BY MOUTH EVERY 12 HOURS FOR 1 DAY. BEGIN AS SOON AS POSSIBLE AFTER SYMPTOM ONSET     Family History    None       Tobacco Use    Smoking status: Never    Smokeless tobacco: Never   Substance and Sexual Activity    Alcohol use: Not on file    Drug use: Not on file    Sexual activity: Not on file     Review of Systems   Constitutional:  Positive for fever. Negative for chills.   HENT:  Positive for congestion. Negative for ear discharge and ear pain.    Eyes:  Negative for pain and discharge.   Respiratory:  Negative for cough and shortness of breath.    Cardiovascular:  Negative for chest pain and leg swelling.   Gastrointestinal:  Positive for abdominal pain. Negative for nausea and vomiting.   Endocrine: Negative for cold intolerance and heat intolerance.   Genitourinary:  Positive for dysuria and flank pain. Negative for difficulty urinating.        Unable to empty bladder out completely   Musculoskeletal:  Negative for joint swelling and myalgias.   Skin:  Negative for rash and wound.   Neurological:  Negative for dizziness and headaches.   Psychiatric/Behavioral:  Negative for agitation and confusion.      Objective:     Vital Signs (Most Recent):  Temp: 98.8 °F (37.1 °C) (06/07/24 1339)  Pulse: 93 (06/07/24 1339)  Resp: 17 (06/07/24 1339)  BP: 131/71 (06/07/24 1339)  SpO2: 96 % (06/07/24 1339) Vital Signs (24h Range):  Temp:  [98.8 °F (37.1 °C)-99.7 °F (37.6 °C)] 98.8 °F (37.1 °C)  Pulse:  [] 93  Resp:  [17-20]  "17  SpO2:  [96 %-100 %] 96 %  BP: (104-135)/(68-85) 131/71     Weight: 69.2 kg (152 lb 8.9 oz)  Body mass index is 30.81 kg/m².     Physical Exam  Constitutional:       General: She is not in acute distress.  HENT:      Head: Normocephalic and atraumatic.   Eyes:      General:         Right eye: No discharge.         Left eye: No discharge.   Cardiovascular:      Rate and Rhythm: Normal rate and regular rhythm.   Pulmonary:      Effort: Pulmonary effort is normal.      Breath sounds: Normal breath sounds.   Abdominal:      General: There is no distension.      Tenderness: There is no abdominal tenderness.   Genitourinary:     Comments: Left flank pain   Musculoskeletal:         General: No swelling or tenderness.      Cervical back: Neck supple. No tenderness.   Skin:     General: Skin is warm and dry.   Neurological:      General: No focal deficit present.      Mental Status: She is alert and oriented to person, place, and time.   Psychiatric:         Mood and Affect: Mood normal.         Behavior: Behavior normal.                Significant Labs: A1C: No results for input(s): "HGBA1C" in the last 4320 hours.  ABGs: No results for input(s): "PH", "PCO2", "HCO3", "POCSATURATED", "BE", "TOTALHB", "COHB", "METHB", "O2HB", "POCFIO2", "PO2" in the last 48 hours.  Bilirubin:   Recent Labs   Lab 05/14/24  1031 06/07/24  1158   BILITOT 0.5 0.3     Blood Culture: No results for input(s): "LABBLOO" in the last 48 hours.  BMP:   Recent Labs   Lab 06/07/24  1158   GLU 90      K 4.0      CO2 22*   BUN 8   CREATININE 0.9   CALCIUM 10.0     CBC:   Recent Labs   Lab 06/07/24  1158   WBC 5.75   HGB 12.2   HCT 36.8*        CMP:   Recent Labs   Lab 06/07/24  1158      K 4.0      CO2 22*   GLU 90   BUN 8   CREATININE 0.9   CALCIUM 10.0   PROT 7.9   ALBUMIN 4.1   BILITOT 0.3   ALKPHOS 58   AST 18   ALT 44   ANIONGAP 11     Cardiac Markers: No results for input(s): "CKMB", "MYOGLOBIN", "BNP", "TROPISTAT" " "in the last 48 hours.  Coagulation: No results for input(s): "PT", "INR", "APTT" in the last 48 hours.  Lactic Acid:   Recent Labs   Lab 06/07/24  1158   LACTATE 1.6     Lipase:   Recent Labs   Lab 06/07/24  1200   LIPASE 13     Lipid Panel: No results for input(s): "CHOL", "HDL", "LDLCALC", "TRIG", "CHOLHDL" in the last 48 hours.  Magnesium: No results for input(s): "MG" in the last 48 hours.  POCT Glucose: No results for input(s): "POCTGLUCOSE" in the last 48 hours.  Prealbumin: No results for input(s): "PREALBUMIN" in the last 48 hours.  Respiratory Culture: No results for input(s): "GSRESP", "RESPIRATORYC" in the last 48 hours.  Troponin: No results for input(s): "TROPONINI", "TROPONINIHS" in the last 48 hours.  TSH:   Recent Labs   Lab 05/14/24  1031   TSH 0.416     Urine Culture: No results for input(s): "LABURIN" in the last 48 hours.  Urine Studies:   Recent Labs   Lab 06/07/24  1150   COLORU Yellow   APPEARANCEUA Clear   PHUR 6.0   SPECGRAV 1.015   PROTEINUA Negative   GLUCUA Negative   KETONESU Negative   BILIRUBINUA Negative   OCCULTUA Negative   NITRITE Negative   UROBILINOGEN Negative   LEUKOCYTESUR Trace*   RBCUA 0   WBCUA 10*   BACTERIA Moderate*   HYALINECASTS 0       Significant Imaging: I have reviewed all pertinent imaging results/findings within the past 24 hours.  Assessment/Plan:     * Pyelonephritis  Recent CT scan with perinephric stranding  IV meropenem based on recent urine culture ecoli ESBL  Pain control   Pt has a urology apt on Tuesday         VTE Risk Mitigation (From admission, onward)           Ordered     IP VTE HIGH RISK PATIENT  Once         06/07/24 1413     Place sequential compression device  Until discontinued         06/07/24 1413                                    Betzaida Dailey PA-C  Department of [facility] Medicine  [facility] - Emergency Dept          "

## 2024-06-07 NOTE — SUBJECTIVE & OBJECTIVE
History reviewed. No pertinent past medical history.    Past Surgical History:   Procedure Laterality Date     SECTION         Review of patient's allergies indicates:  No Known Allergies    No current facility-administered medications on file prior to encounter.     Current Outpatient Medications on File Prior to Encounter   Medication Sig    acetaminophen (TYLENOL) 500 MG tablet Take 500 mg by mouth every 6 (six) hours as needed for Pain.    ciprofloxacin HCl (CIPRO) 500 MG tablet Take 1 tablet (500 mg total) by mouth 2 (two) times daily. for 10 days    multivit-iron-FA-calcium-mins (WOMEN'S DAILY FORMULA) 18 mg iron-400 mcg-500 mg Ca Tab Take 1 tablet by mouth once daily.    [DISCONTINUED] sulfamethoxazole-trimethoprim 800-160mg (BACTRIM DS) 800-160 mg Tab Take 1 tablet by mouth every 12 (twelve) hours.    [DISCONTINUED] aspirin (ECOTRIN) 81 MG EC tablet Take 1 tablet (81 mg total) by mouth once daily.    [DISCONTINUED] LO LOESTRIN FE 1 mg-10 mcg (24)/10 mcg (2) Tab Take 1 tablet by mouth once daily.    [DISCONTINUED] NIFEdipine (PROCARDIA-XL) 30 MG (OSM) 24 hr tablet TAKE 1 TABLET BY MOUTH EVERY DAY    [DISCONTINUED] prenatal vit103-iron fum-folic () 27 mg iron- 1 mg Tab Take 1 tablet by mouth once daily.    [DISCONTINUED] ursodioL (ACTIGALL) 300 mg capsule Take 300 mg by mouth 2 (two) times daily.    [DISCONTINUED] valACYclovir (VALTREX) 1000 MG tablet TAKE 2 TABLETS BY MOUTH EVERY 12 HOURS FOR 1 DAY. BEGIN AS SOON AS POSSIBLE AFTER SYMPTOM ONSET     Family History    None       Tobacco Use    Smoking status: Never    Smokeless tobacco: Never   Substance and Sexual Activity    Alcohol use: Not on file    Drug use: Not on file    Sexual activity: Not on file     Review of Systems   Constitutional:  Positive for fever. Negative for chills.   HENT:  Positive for congestion. Negative for ear discharge and ear pain.    Eyes:  Negative for pain and discharge.   Respiratory:  Negative for cough and  shortness of breath.    Cardiovascular:  Negative for chest pain and leg swelling.   Gastrointestinal:  Positive for abdominal pain. Negative for nausea and vomiting.   Endocrine: Negative for cold intolerance and heat intolerance.   Genitourinary:  Positive for dysuria and flank pain. Negative for difficulty urinating.        Unable to empty bladder out completely   Musculoskeletal:  Negative for joint swelling and myalgias.   Skin:  Negative for rash and wound.   Neurological:  Negative for dizziness and headaches.   Psychiatric/Behavioral:  Negative for agitation and confusion.      Objective:     Vital Signs (Most Recent):  Temp: 98.8 °F (37.1 °C) (06/07/24 1339)  Pulse: 93 (06/07/24 1339)  Resp: 17 (06/07/24 1339)  BP: 131/71 (06/07/24 1339)  SpO2: 96 % (06/07/24 1339) Vital Signs (24h Range):  Temp:  [98.8 °F (37.1 °C)-99.7 °F (37.6 °C)] 98.8 °F (37.1 °C)  Pulse:  [] 93  Resp:  [17-20] 17  SpO2:  [96 %-100 %] 96 %  BP: (104-135)/(68-85) 131/71     Weight: 69.2 kg (152 lb 8.9 oz)  Body mass index is 30.81 kg/m².     Physical Exam  Constitutional:       General: She is not in acute distress.  HENT:      Head: Normocephalic and atraumatic.   Eyes:      General:         Right eye: No discharge.         Left eye: No discharge.   Cardiovascular:      Rate and Rhythm: Normal rate and regular rhythm.   Pulmonary:      Effort: Pulmonary effort is normal.      Breath sounds: Normal breath sounds.   Abdominal:      General: There is no distension.      Tenderness: There is no abdominal tenderness.   Genitourinary:     Comments: Left flank pain   Musculoskeletal:         General: No swelling or tenderness.      Cervical back: Neck supple. No tenderness.   Skin:     General: Skin is warm and dry.   Neurological:      General: No focal deficit present.      Mental Status: She is alert and oriented to person, place, and time.   Psychiatric:         Mood and Affect: Mood normal.         Behavior: Behavior normal.         "        Significant Labs: A1C: No results for input(s): "HGBA1C" in the last 4320 hours.  ABGs: No results for input(s): "PH", "PCO2", "HCO3", "POCSATURATED", "BE", "TOTALHB", "COHB", "METHB", "O2HB", "POCFIO2", "PO2" in the last 48 hours.  Bilirubin:   Recent Labs   Lab 05/14/24  1031 06/07/24  1158   BILITOT 0.5 0.3     Blood Culture: No results for input(s): "LABBLOO" in the last 48 hours.  BMP:   Recent Labs   Lab 06/07/24  1158   GLU 90      K 4.0      CO2 22*   BUN 8   CREATININE 0.9   CALCIUM 10.0     CBC:   Recent Labs   Lab 06/07/24  1158   WBC 5.75   HGB 12.2   HCT 36.8*        CMP:   Recent Labs   Lab 06/07/24  1158      K 4.0      CO2 22*   GLU 90   BUN 8   CREATININE 0.9   CALCIUM 10.0   PROT 7.9   ALBUMIN 4.1   BILITOT 0.3   ALKPHOS 58   AST 18   ALT 44   ANIONGAP 11     Cardiac Markers: No results for input(s): "CKMB", "MYOGLOBIN", "BNP", "TROPISTAT" in the last 48 hours.  Coagulation: No results for input(s): "PT", "INR", "APTT" in the last 48 hours.  Lactic Acid:   Recent Labs   Lab 06/07/24  1158   LACTATE 1.6     Lipase:   Recent Labs   Lab 06/07/24  1200   LIPASE 13     Lipid Panel: No results for input(s): "CHOL", "HDL", "LDLCALC", "TRIG", "CHOLHDL" in the last 48 hours.  Magnesium: No results for input(s): "MG" in the last 48 hours.  POCT Glucose: No results for input(s): "POCTGLUCOSE" in the last 48 hours.  Prealbumin: No results for input(s): "PREALBUMIN" in the last 48 hours.  Respiratory Culture: No results for input(s): "GSRESP", "RESPIRATORYC" in the last 48 hours.  Troponin: No results for input(s): "TROPONINI", "TROPONINIHS" in the last 48 hours.  TSH:   Recent Labs   Lab 05/14/24  1031   TSH 0.416     Urine Culture: No results for input(s): "LABURIN" in the last 48 hours.  Urine Studies:   Recent Labs   Lab 06/07/24  1150   COLORU Yellow   APPEARANCEUA Clear   PHUR 6.0   SPECGRAV 1.015   PROTEINUA Negative   GLUCUA Negative   KETONESU Negative "   BILIRUBINUA Negative   OCCULTUA Negative   NITRITE Negative   UROBILINOGEN Negative   LEUKOCYTESUR Trace*   RBCUA 0   WBCUA 10*   BACTERIA Moderate*   HYALINECASTS 0       Significant Imaging: I have reviewed all pertinent imaging results/findings within the past 24 hours.

## 2024-06-07 NOTE — PHARMACY MED REC
"Admission Medication History     The home medication history was taken by Candice Palacios CPhT.    Medication history obtained from, Patient Verified    You may go to "Admission" then "Reconcile Home Medications" tabs to review and/or act upon these items.     The home medication list has been updated by the Pharmacy department.   Please read ALL comments highlighted in yellow.   Please address this information as you see fit.    Feel free to contact us if you have any questions or require assistance.      The medications listed below were removed from the home medication list.  Please reorder if appropriate:  Patient reports no longer taking the following medication(s):  Aspirin 81 mg  Bactrim -160 mg  Lo-Loestrin FE 1 mg-10 mcg   Nifedipine XL 30 mg   27 mg iron- 1 mg         Candice Palacios CPhT.  Ext [phone]               .          "

## 2024-06-08 PROCEDURE — 11000001 HC ACUTE MED/SURG PRIVATE ROOM

## 2024-06-08 PROCEDURE — 63600175 PHARM REV CODE 636 W HCPCS: Performed by: FAMILY MEDICINE

## 2024-06-08 PROCEDURE — 27000207 HC ISOLATION

## 2024-06-08 PROCEDURE — 25000003 PHARM REV CODE 250: Performed by: STUDENT IN AN ORGANIZED HEALTH CARE EDUCATION/TRAINING PROGRAM

## 2024-06-08 PROCEDURE — 94761 N-INVAS EAR/PLS OXIMETRY MLT: CPT

## 2024-06-08 PROCEDURE — 99232 SBSQ HOSP IP/OBS MODERATE 35: CPT | Mod: ,,, | Performed by: FAMILY MEDICINE

## 2024-06-08 PROCEDURE — 25000003 PHARM REV CODE 250: Performed by: FAMILY MEDICINE

## 2024-06-08 PROCEDURE — 63600175 PHARM REV CODE 636 W HCPCS: Performed by: STUDENT IN AN ORGANIZED HEALTH CARE EDUCATION/TRAINING PROGRAM

## 2024-06-08 RX ORDER — POLYETHYLENE GLYCOL 3350 17 G/17G
17 POWDER, FOR SOLUTION ORAL DAILY
Status: DISCONTINUED | OUTPATIENT
Start: 2024-06-08 | End: 2024-06-10 | Stop reason: HOSPADM

## 2024-06-08 RX ORDER — ONDANSETRON HYDROCHLORIDE 2 MG/ML
8 INJECTION, SOLUTION INTRAVENOUS EVERY 8 HOURS PRN
Status: DISCONTINUED | OUTPATIENT
Start: 2024-06-08 | End: 2024-06-10 | Stop reason: HOSPADM

## 2024-06-08 RX ADMIN — ACETAMINOPHEN 650 MG: 325 TABLET ORAL at 09:06

## 2024-06-08 RX ADMIN — ACETAMINOPHEN 650 MG: 325 TABLET ORAL at 12:06

## 2024-06-08 RX ADMIN — POLYETHYLENE GLYCOL 3350 17 G: 17 POWDER, FOR SOLUTION ORAL at 01:06

## 2024-06-08 RX ADMIN — ACETAMINOPHEN 650 MG: 325 TABLET ORAL at 06:06

## 2024-06-08 RX ADMIN — MEROPENEM 500 MG: 500 INJECTION, POWDER, FOR SOLUTION INTRAVENOUS at 08:06

## 2024-06-08 RX ADMIN — MEROPENEM 500 MG: 500 INJECTION, POWDER, FOR SOLUTION INTRAVENOUS at 01:06

## 2024-06-08 RX ADMIN — MEROPENEM 500 MG: 500 INJECTION, POWDER, FOR SOLUTION INTRAVENOUS at 05:06

## 2024-06-08 RX ADMIN — ACETAMINOPHEN 650 MG: 325 TABLET ORAL at 01:06

## 2024-06-08 RX ADMIN — ONDANSETRON 8 MG: 2 INJECTION INTRAMUSCULAR; INTRAVENOUS at 09:06

## 2024-06-08 NOTE — SUBJECTIVE & OBJECTIVE
History reviewed. No pertinent past medical history.    Past Surgical History:   Procedure Laterality Date     SECTION         Review of patient's allergies indicates:  No Known Allergies    No current facility-administered medications on file prior to encounter.     Current Outpatient Medications on File Prior to Encounter   Medication Sig    acetaminophen (TYLENOL) 500 MG tablet Take 500 mg by mouth every 6 (six) hours as needed for Pain.    ciprofloxacin HCl (CIPRO) 500 MG tablet Take 1 tablet (500 mg total) by mouth 2 (two) times daily. for 10 days    multivit-iron-FA-calcium-mins (WOMEN'S DAILY FORMULA) 18 mg iron-400 mcg-500 mg Ca Tab Take 1 tablet by mouth once daily.     Family History    None       Tobacco Use    Smoking status: Never    Smokeless tobacco: Never   Substance and Sexual Activity    Alcohol use: Not on file    Drug use: Not on file    Sexual activity: Not on file     Review of Systems   Constitutional:  Positive for chills and fever.   Respiratory:  Negative for shortness of breath.    Cardiovascular:  Negative for chest pain and palpitations.   Gastrointestinal:  Positive for nausea. Negative for abdominal pain, blood in stool and constipation.   Genitourinary:  Positive for flank pain. Negative for difficulty urinating.   Psychiatric/Behavioral:  Negative for dysphoric mood, sleep disturbance and suicidal ideas. The patient is not nervous/anxious.      Objective:     Vital Signs (Most Recent):  Temp: 98.3 °F (36.8 °C) (24 0754)  Pulse: 76 (24 0754)  Resp: 18 (24 0754)  BP: 115/77 (24 0754)  SpO2: 96 % (24 0754) Vital Signs (24h Range):  Temp:  [98.3 °F (36.8 °C)-99.7 °F (37.6 °C)] 98.3 °F (36.8 °C)  Pulse:  [] 76  Resp:  [17-20] 18  SpO2:  [95 %-100 %] 96 %  BP: (100-140)/(57-85) 115/77     Weight: 69.2 kg (152 lb 8.9 oz)  Body mass index is 30.81 kg/m².     Physical Exam  Constitutional:       Appearance: She is well-developed.   HENT:      Head:  "Normocephalic and atraumatic.      Right Ear: External ear normal.      Left Ear: External ear normal.      Nose: Nose normal.   Eyes:      Pupils: Pupils are equal, round, and reactive to light.   Neck:      Thyroid: No thyromegaly.      Vascular: No JVD.      Trachea: No tracheal deviation.   Cardiovascular:      Rate and Rhythm: Normal rate.      Heart sounds: Normal heart sounds. No murmur heard.  Pulmonary:      Effort: Pulmonary effort is normal. No respiratory distress.      Breath sounds: Normal breath sounds. No wheezing or rales.   Chest:      Chest wall: No tenderness.   Abdominal:      General: Bowel sounds are normal. There is no distension.      Palpations: Abdomen is soft. There is no mass.      Tenderness: There is no abdominal tenderness. There is no guarding or rebound.   Genitourinary:     Comments: Positive CVA TTP   Musculoskeletal:         General: No tenderness. Normal range of motion.      Cervical back: Normal range of motion and neck supple.   Lymphadenopathy:      Cervical: No cervical adenopathy.   Skin:     General: Skin is warm and dry.      Coloration: Skin is not pale.      Findings: No erythema or rash.   Neurological:      Mental Status: She is alert and oriented to person, place, and time.      Cranial Nerves: No cranial nerve deficit.      Motor: No abnormal muscle tone.      Coordination: Coordination normal.      Deep Tendon Reflexes: Reflexes are normal and symmetric. Reflexes normal.   Psychiatric:         Behavior: Behavior normal.         Thought Content: Thought content normal.         Judgment: Judgment normal.              CRANIAL NERVES     CN III, IV, VI   Pupils are equal, round, and reactive to light.       Significant Labs: All pertinent labs within the past 24 hours have been reviewed.  Blood Culture: No results for input(s): "LABBLOO" in the last 48 hours.  CBC:   Recent Labs   Lab 06/07/24  1158   WBC 5.75   HGB 12.2   HCT 36.8*        CMP:   Recent Labs " "  Lab 06/07/24  1158      K 4.0      CO2 22*   GLU 90   BUN 8   CREATININE 0.9   CALCIUM 10.0   PROT 7.9   ALBUMIN 4.1   BILITOT 0.3   ALKPHOS 58   AST 18   ALT 44   ANIONGAP 11     Urine Culture: No results for input(s): "LABURIN" in the last 48 hours.  Urine Studies:   Recent Labs   Lab 06/07/24  1150   COLORU Yellow   APPEARANCEUA Clear   PHUR 6.0   SPECGRAV 1.015   PROTEINUA Negative   GLUCUA Negative   KETONESU Negative   BILIRUBINUA Negative   OCCULTUA Negative   NITRITE Negative   UROBILINOGEN Negative   LEUKOCYTESUR Trace*   RBCUA 0   WBCUA 10*   BACTERIA Moderate*   HYALINECASTS 0       Significant Imaging: I have reviewed all pertinent imaging results/findings within the past 24 hours.  "

## 2024-06-08 NOTE — ASSESSMENT & PLAN NOTE
Recent CT scan with perinephric stranding  IV meropenem day 2 based on recent urine culture ecoli ESBL  Pain control   Pt has a urology apt on Tuesday

## 2024-06-08 NOTE — H&P
[facility] ([address] [place] [address] Medicine  History & Physical    Patient Name: Zheng Beverly  MRN: [id number]  Patient Class: IP- Inpatient  Admission Date: 2024  Attending Physician: Farhat Cannon MD   Primary Care Provider: Sloane Kimbrough FNP         Patient information was obtained from patient and ER records.     Subjective:     Principal Problem:Pyelonephritis    Chief Complaint:   Chief Complaint   Patient presents with    Back Pain        HPI: Patient is a 28 year old female who presented to the ED with severe left flank pain.  She was diagnosed with UTI/pyelonephritis several days ago and received cipro.  CT renal stone study showed perinephric stranding of the left kidney at that time.  Final urinary culture positive for ESBL Ecoli not sensitive to cipro.  She has had fever but denies CP, SOB, nausea and vomiting.  She has had multiple UTIs growing up and when she was a teenager she saw a urologist and was told she doesn't empty her bladder completely.  She has an appt with Dr. Belcher for this upcoming Tuesday         Admitted for MDR pyelonephritis.      History reviewed. No pertinent past medical history.    Past Surgical History:   Procedure Laterality Date     SECTION         Review of patient's allergies indicates:  No Known Allergies    No current facility-administered medications on file prior to encounter.     Current Outpatient Medications on File Prior to Encounter   Medication Sig    acetaminophen (TYLENOL) 500 MG tablet Take 500 mg by mouth every 6 (six) hours as needed for Pain.    ciprofloxacin HCl (CIPRO) 500 MG tablet Take 1 tablet (500 mg total) by mouth 2 (two) times daily. for 10 days    multivit-iron-FA-calcium-mins (WOMEN'S DAILY FORMULA) 18 mg iron-400 mcg-500 mg Ca Tab Take 1 tablet by mouth once daily.    [DISCONTINUED] sulfamethoxazole-trimethoprim 800-160mg (BACTRIM DS) 800-160 mg Tab Take 1 tablet by mouth every 12 (twelve) hours.    [DISCONTINUED] aspirin  (ECOTRIN) 81 MG EC tablet Take 1 tablet (81 mg total) by mouth once daily.    [DISCONTINUED] LO LOESTRIN FE 1 mg-10 mcg (24)/10 mcg (2) Tab Take 1 tablet by mouth once daily.    [DISCONTINUED] NIFEdipine (PROCARDIA-XL) 30 MG (OSM) 24 hr tablet TAKE 1 TABLET BY MOUTH EVERY DAY    [DISCONTINUED] prenatal vit103-iron fum-folic () 27 mg iron- 1 mg Tab Take 1 tablet by mouth once daily.    [DISCONTINUED] ursodioL (ACTIGALL) 300 mg capsule Take 300 mg by mouth 2 (two) times daily.    [DISCONTINUED] valACYclovir (VALTREX) 1000 MG tablet TAKE 2 TABLETS BY MOUTH EVERY 12 HOURS FOR 1 DAY. BEGIN AS SOON AS POSSIBLE AFTER SYMPTOM ONSET     Family History    None       Tobacco Use    Smoking status: Never    Smokeless tobacco: Never   Substance and Sexual Activity    Alcohol use: Not on file    Drug use: Not on file    Sexual activity: Not on file     Review of Systems   Constitutional:  Positive for chills and fever.   Respiratory:  Negative for shortness of breath.    Cardiovascular:  Negative for chest pain and palpitations.   Gastrointestinal:  Positive for nausea. Negative for abdominal pain, blood in stool and constipation.   Genitourinary:  Positive for flank pain. Negative for difficulty urinating.   Psychiatric/Behavioral:  Negative for dysphoric mood, sleep disturbance and suicidal ideas. The patient is not nervous/anxious.      Objective:     Vital Signs (Most Recent):  Temp: 98.3 °F (36.8 °C) (06/07/24 1526)  Pulse: 88 (06/07/24 1526)  Resp: 20 (06/07/24 1526)  BP: 114/70 (06/07/24 1526)  SpO2: 99 % (06/07/24 1526) Vital Signs (24h Range):  Temp:  [98.3 °F (36.8 °C)-99.7 °F (37.6 °C)] 98.3 °F (36.8 °C)  Pulse:  [] 88  Resp:  [17-20] 20  SpO2:  [96 %-100 %] 99 %  BP: (104-140)/(68-85) 114/70     Weight: 69.2 kg (152 lb 8.9 oz)  Body mass index is 30.81 kg/m².     Physical Exam  Constitutional:       Appearance: She is well-developed.   HENT:      Head: Normocephalic and atraumatic.      Right Ear:  "External ear normal.      Left Ear: External ear normal.      Nose: Nose normal.   Eyes:      Pupils: Pupils are equal, round, and reactive to light.   Neck:      Thyroid: No thyromegaly.      Vascular: No JVD.      Trachea: No tracheal deviation.   Cardiovascular:      Rate and Rhythm: Normal rate.      Heart sounds: Normal heart sounds. No murmur heard.  Pulmonary:      Effort: Pulmonary effort is normal. No respiratory distress.      Breath sounds: Normal breath sounds. No wheezing or rales.   Chest:      Chest wall: No tenderness.   Abdominal:      General: Bowel sounds are normal. There is no distension.      Palpations: Abdomen is soft. There is no mass.      Tenderness: There is no abdominal tenderness. There is no guarding or rebound.   Genitourinary:     Comments: Positive CVA TTP   Musculoskeletal:         General: No tenderness. Normal range of motion.      Cervical back: Normal range of motion and neck supple.   Lymphadenopathy:      Cervical: No cervical adenopathy.   Skin:     General: Skin is warm and dry.      Coloration: Skin is not pale.      Findings: No erythema or rash.   Neurological:      Mental Status: She is alert and oriented to person, place, and time.      Cranial Nerves: No cranial nerve deficit.      Motor: No abnormal muscle tone.      Coordination: Coordination normal.      Deep Tendon Reflexes: Reflexes are normal and symmetric. Reflexes normal.   Psychiatric:         Behavior: Behavior normal.         Thought Content: Thought content normal.         Judgment: Judgment normal.              CRANIAL NERVES     CN III, IV, VI   Pupils are equal, round, and reactive to light.       Significant Labs: All pertinent labs within the past 24 hours have been reviewed.  Blood Culture: No results for input(s): "LABBLOO" in the last 48 hours.  CBC:   Recent Labs   Lab 06/07/24  1158   WBC 5.75   HGB 12.2   HCT 36.8*        CMP:   Recent Labs   Lab 06/07/24  1158      K 4.0    " "  CO2 22*   GLU 90   BUN 8   CREATININE 0.9   CALCIUM 10.0   PROT 7.9   ALBUMIN 4.1   BILITOT 0.3   ALKPHOS 58   AST 18   ALT 44   ANIONGAP 11     Urine Culture: No results for input(s): "LABURIN" in the last 48 hours.  Urine Studies:   Recent Labs   Lab 06/07/24  1150   COLORU Yellow   APPEARANCEUA Clear   PHUR 6.0   SPECGRAV 1.015   PROTEINUA Negative   GLUCUA Negative   KETONESU Negative   BILIRUBINUA Negative   OCCULTUA Negative   NITRITE Negative   UROBILINOGEN Negative   LEUKOCYTESUR Trace*   RBCUA 0   WBCUA 10*   BACTERIA Moderate*   HYALINECASTS 0       Significant Imaging: I have reviewed all pertinent imaging results/findings within the past 24 hours.  Assessment/Plan:     * Pyelonephritis  Recent CT scan with perinephric stranding  IV meropenem based on recent urine culture ecoli ESBL  Pain control   Pt has a urology apt on Tuesday         VTE Risk Mitigation (From admission, onward)           Ordered     IP VTE HIGH RISK PATIENT  Once         06/07/24 1413     Place sequential compression device  Until discontinued         06/07/24 1413                                    Farhat Cannon MD  Department of Hospital Medicine  [place] [facility] (3rd Fl)          "

## 2024-06-08 NOTE — PROGRESS NOTES
[facility] ([facility])  [facility] Medicine  Progress Note    Patient Name: Zheng Beverly  MRN: [id number]  Patient Class: IP- Inpatient   Admission Date: 2024  Length of Stay: 1 days  Attending Physician: Farhat Cannon MD  Primary Care Provider: Sloane Kimbrough FNP        Subjective:     Principal Problem:Pyelonephritis        HPI:  Patient is a 28 year old female who presented to the ED with severe left flank pain.  She was diagnosed with UTI/pyelonephritis several days ago and received cipro.  CT renal stone study showed perinephric stranding of the left kidney at that time.  Final urinary culture positive for ESBL Ecoli not sensitive to cipro.  She has had fever but denies CP, SOB, nausea and vomiting.  She has had multiple UTIs growing up and when she was a teenager she saw a urologist and was told she doesn't empty her bladder completely.  She has an appt with Dr. Belcher for this upcoming Tuesday         Admitted for MDR pyelonephritis.      Overview/Hospital Course:  AFVSS (low grade temp, no true fever)   Flank pain about the same       History reviewed. No pertinent past medical history.    Past Surgical History:   Procedure Laterality Date     SECTION         Review of patient's allergies indicates:  No Known Allergies    No current facility-administered medications on file prior to encounter.     Current Outpatient Medications on File Prior to Encounter   Medication Sig    acetaminophen (TYLENOL) 500 MG tablet Take 500 mg by mouth every 6 (six) hours as needed for Pain.    ciprofloxacin HCl (CIPRO) 500 MG tablet Take 1 tablet (500 mg total) by mouth 2 (two) times daily. for 10 days    multivit-iron-FA-calcium-mins (WOMEN'S DAILY FORMULA) 18 mg iron-400 mcg-500 mg Ca Tab Take 1 tablet by mouth once daily.     Family History    None       Tobacco Use    Smoking status: Never    Smokeless tobacco: Never   Substance and Sexual Activity    Alcohol use: Not on file    Drug use: Not on file     Sexual activity: Not on file     Review of Systems   Constitutional:  Positive for chills and fever.   Respiratory:  Negative for shortness of breath.    Cardiovascular:  Negative for chest pain and palpitations.   Gastrointestinal:  Positive for nausea. Negative for abdominal pain, blood in stool and constipation.   Genitourinary:  Positive for flank pain. Negative for difficulty urinating.   Psychiatric/Behavioral:  Negative for dysphoric mood, sleep disturbance and suicidal ideas. The patient is not nervous/anxious.      Objective:     Vital Signs (Most Recent):  Temp: 98.3 °F (36.8 °C) (06/08/24 0754)  Pulse: 76 (06/08/24 0754)  Resp: 18 (06/08/24 0754)  BP: 115/77 (06/08/24 0754)  SpO2: 96 % (06/08/24 0754) Vital Signs (24h Range):  Temp:  [98.3 °F (36.8 °C)-99.7 °F (37.6 °C)] 98.3 °F (36.8 °C)  Pulse:  [] 76  Resp:  [17-20] 18  SpO2:  [95 %-100 %] 96 %  BP: (100-140)/(57-85) 115/77     Weight: 69.2 kg (152 lb 8.9 oz)  Body mass index is 30.81 kg/m².     Physical Exam  Constitutional:       Appearance: She is well-developed.   HENT:      Head: Normocephalic and atraumatic.      Right Ear: External ear normal.      Left Ear: External ear normal.      Nose: Nose normal.   Eyes:      Pupils: Pupils are equal, round, and reactive to light.   Neck:      Thyroid: No thyromegaly.      Vascular: No JVD.      Trachea: No tracheal deviation.   Cardiovascular:      Rate and Rhythm: Normal rate.      Heart sounds: Normal heart sounds. No murmur heard.  Pulmonary:      Effort: Pulmonary effort is normal. No respiratory distress.      Breath sounds: Normal breath sounds. No wheezing or rales.   Chest:      Chest wall: No tenderness.   Abdominal:      General: Bowel sounds are normal. There is no distension.      Palpations: Abdomen is soft. There is no mass.      Tenderness: There is no abdominal tenderness. There is no guarding or rebound.   Genitourinary:     Comments: Positive CVA TTP   Musculoskeletal:          "General: No tenderness. Normal range of motion.      Cervical back: Normal range of motion and neck supple.   Lymphadenopathy:      Cervical: No cervical adenopathy.   Skin:     General: Skin is warm and dry.      Coloration: Skin is not pale.      Findings: No erythema or rash.   Neurological:      Mental Status: She is alert and oriented to person, place, and time.      Cranial Nerves: No cranial nerve deficit.      Motor: No abnormal muscle tone.      Coordination: Coordination normal.      Deep Tendon Reflexes: Reflexes are normal and symmetric. Reflexes normal.   Psychiatric:         Behavior: Behavior normal.         Thought Content: Thought content normal.         Judgment: Judgment normal.              CRANIAL NERVES     CN III, IV, VI   Pupils are equal, round, and reactive to light.       Significant Labs: All pertinent labs within the past 24 hours have been reviewed.  Blood Culture: No results for input(s): "LABBLOO" in the last 48 hours.  CBC:   Recent Labs   Lab 06/07/24  1158   WBC 5.75   HGB 12.2   HCT 36.8*        CMP:   Recent Labs   Lab 06/07/24  1158      K 4.0      CO2 22*   GLU 90   BUN 8   CREATININE 0.9   CALCIUM 10.0   PROT 7.9   ALBUMIN 4.1   BILITOT 0.3   ALKPHOS 58   AST 18   ALT 44   ANIONGAP 11     Urine Culture: No results for input(s): "LABURIN" in the last 48 hours.  Urine Studies:   Recent Labs   Lab 06/07/24  1150   COLORU Yellow   APPEARANCEUA Clear   PHUR 6.0   SPECGRAV 1.015   PROTEINUA Negative   GLUCUA Negative   KETONESU Negative   BILIRUBINUA Negative   OCCULTUA Negative   NITRITE Negative   UROBILINOGEN Negative   LEUKOCYTESUR Trace*   RBCUA 0   WBCUA 10*   BACTERIA Moderate*   HYALINECASTS 0       Significant Imaging: I have reviewed all pertinent imaging results/findings within the past 24 hours.    Assessment/Plan:      * Pyelonephritis  Recent CT scan with perinephric stranding  IV meropenem day 2 based on recent urine culture ecoli ESBL  Pain control "   Pt has a urology apt on Tuesday         VTE Risk Mitigation (From admission, onward)           Ordered     IP VTE HIGH RISK PATIENT  Once         06/07/24 1413     Place sequential compression device  Until discontinued         06/07/24 1413                    Discharge Planning   MYLES:      Code Status: Full Code   Is the patient medically ready for discharge?:     Reason for patient still in hospital (select all that apply): Treatment                     Farhat Cannon MD  Department of Hospital Medicine   [facility] Surg (3rd Fl)

## 2024-06-08 NOTE — SUBJECTIVE & OBJECTIVE
History reviewed. No pertinent past medical history.    Past Surgical History:   Procedure Laterality Date     SECTION         Review of patient's allergies indicates:  No Known Allergies    No current facility-administered medications on file prior to encounter.     Current Outpatient Medications on File Prior to Encounter   Medication Sig    acetaminophen (TYLENOL) 500 MG tablet Take 500 mg by mouth every 6 (six) hours as needed for Pain.    ciprofloxacin HCl (CIPRO) 500 MG tablet Take 1 tablet (500 mg total) by mouth 2 (two) times daily. for 10 days    multivit-iron-FA-calcium-mins (WOMEN'S DAILY FORMULA) 18 mg iron-400 mcg-500 mg Ca Tab Take 1 tablet by mouth once daily.    [DISCONTINUED] sulfamethoxazole-trimethoprim 800-160mg (BACTRIM DS) 800-160 mg Tab Take 1 tablet by mouth every 12 (twelve) hours.    [DISCONTINUED] aspirin (ECOTRIN) 81 MG EC tablet Take 1 tablet (81 mg total) by mouth once daily.    [DISCONTINUED] LO LOESTRIN FE 1 mg-10 mcg (24)/10 mcg (2) Tab Take 1 tablet by mouth once daily.    [DISCONTINUED] NIFEdipine (PROCARDIA-XL) 30 MG (OSM) 24 hr tablet TAKE 1 TABLET BY MOUTH EVERY DAY    [DISCONTINUED] prenatal vit103-iron fum-folic () 27 mg iron- 1 mg Tab Take 1 tablet by mouth once daily.    [DISCONTINUED] ursodioL (ACTIGALL) 300 mg capsule Take 300 mg by mouth 2 (two) times daily.    [DISCONTINUED] valACYclovir (VALTREX) 1000 MG tablet TAKE 2 TABLETS BY MOUTH EVERY 12 HOURS FOR 1 DAY. BEGIN AS SOON AS POSSIBLE AFTER SYMPTOM ONSET     Family History    None       Tobacco Use    Smoking status: Never    Smokeless tobacco: Never   Substance and Sexual Activity    Alcohol use: Not on file    Drug use: Not on file    Sexual activity: Not on file     Review of Systems   Constitutional:  Positive for chills and fever.   Respiratory:  Negative for shortness of breath.    Cardiovascular:  Negative for chest pain and palpitations.   Gastrointestinal:  Positive for nausea. Negative for  abdominal pain, blood in stool and constipation.   Genitourinary:  Positive for flank pain. Negative for difficulty urinating.   Psychiatric/Behavioral:  Negative for dysphoric mood, sleep disturbance and suicidal ideas. The patient is not nervous/anxious.      Objective:     Vital Signs (Most Recent):  Temp: 98.3 °F (36.8 °C) (06/07/24 1526)  Pulse: 88 (06/07/24 1526)  Resp: 20 (06/07/24 1526)  BP: 114/70 (06/07/24 1526)  SpO2: 99 % (06/07/24 1526) Vital Signs (24h Range):  Temp:  [98.3 °F (36.8 °C)-99.7 °F (37.6 °C)] 98.3 °F (36.8 °C)  Pulse:  [] 88  Resp:  [17-20] 20  SpO2:  [96 %-100 %] 99 %  BP: (104-140)/(68-85) 114/70     Weight: 69.2 kg (152 lb 8.9 oz)  Body mass index is 30.81 kg/m².     Physical Exam  Constitutional:       Appearance: She is well-developed.   HENT:      Head: Normocephalic and atraumatic.      Right Ear: External ear normal.      Left Ear: External ear normal.      Nose: Nose normal.   Eyes:      Pupils: Pupils are equal, round, and reactive to light.   Neck:      Thyroid: No thyromegaly.      Vascular: No JVD.      Trachea: No tracheal deviation.   Cardiovascular:      Rate and Rhythm: Normal rate.      Heart sounds: Normal heart sounds. No murmur heard.  Pulmonary:      Effort: Pulmonary effort is normal. No respiratory distress.      Breath sounds: Normal breath sounds. No wheezing or rales.   Chest:      Chest wall: No tenderness.   Abdominal:      General: Bowel sounds are normal. There is no distension.      Palpations: Abdomen is soft. There is no mass.      Tenderness: There is no abdominal tenderness. There is no guarding or rebound.   Genitourinary:     Comments: Positive CVA TTP   Musculoskeletal:         General: No tenderness. Normal range of motion.      Cervical back: Normal range of motion and neck supple.   Lymphadenopathy:      Cervical: No cervical adenopathy.   Skin:     General: Skin is warm and dry.      Coloration: Skin is not pale.      Findings: No erythema  "or rash.   Neurological:      Mental Status: She is alert and oriented to person, place, and time.      Cranial Nerves: No cranial nerve deficit.      Motor: No abnormal muscle tone.      Coordination: Coordination normal.      Deep Tendon Reflexes: Reflexes are normal and symmetric. Reflexes normal.   Psychiatric:         Behavior: Behavior normal.         Thought Content: Thought content normal.         Judgment: Judgment normal.              CRANIAL NERVES     CN III, IV, VI   Pupils are equal, round, and reactive to light.       Significant Labs: All pertinent labs within the past 24 hours have been reviewed.  Blood Culture: No results for input(s): "LABBLOO" in the last 48 hours.  CBC:   Recent Labs   Lab 06/07/24  1158   WBC 5.75   HGB 12.2   HCT 36.8*        CMP:   Recent Labs   Lab 06/07/24  1158      K 4.0      CO2 22*   GLU 90   BUN 8   CREATININE 0.9   CALCIUM 10.0   PROT 7.9   ALBUMIN 4.1   BILITOT 0.3   ALKPHOS 58   AST 18   ALT 44   ANIONGAP 11     Urine Culture: No results for input(s): "LABURIN" in the last 48 hours.  Urine Studies:   Recent Labs   Lab 06/07/24  1150   COLORU Yellow   APPEARANCEUA Clear   PHUR 6.0   SPECGRAV 1.015   PROTEINUA Negative   GLUCUA Negative   KETONESU Negative   BILIRUBINUA Negative   OCCULTUA Negative   NITRITE Negative   UROBILINOGEN Negative   LEUKOCYTESUR Trace*   RBCUA 0   WBCUA 10*   BACTERIA Moderate*   HYALINECASTS 0       Significant Imaging: I have reviewed all pertinent imaging results/findings within the past 24 hours.  "

## 2024-06-08 NOTE — HOSPITAL COURSE
AFVSS (low grade temp, no true fever)   Flank pain about the same     6/9  AFVSS   Some nausea last night. No fever   CVA pain slightly improved.     6/10  Doing well   Afebrile   No white count      Abnormal   ESCHERICHIA COLI ESBL  >100,000 cfu/ml     Resulting Agency [facility]        Susceptibility     Escherichia coli ESBL     CULTURE, URINE     Amikacin <=16 mcg/mL Sensitive     Amox/K Clav'ate <=8/4 mcg/mL Resistant     Amp/Sulbactam 16/8 mcg/mL Resistant     Ampicillin >16 mcg/mL Resistant     Cefazolin >16 mcg/mL Resistant     Cefepime >16 mcg/mL Resistant     Ceftriaxone >32 mcg/mL Resistant     Ciprofloxacin >2 mcg/mL Resistant     Ertapenem <=0.5 mcg/mL Sensitive     Gentamicin >8 mcg/mL Resistant     Levofloxacin >4 mcg/mL Resistant     Meropenem <=1 mcg/mL Sensitive     Nitrofurantoin <=32 mcg/mL Sensitive     Piperacillin/Tazo <=16 mcg/mL Resistant     Tobramycin <=4 mcg/mL Sensitive     Trimeth/Sulfa >2/38 mcg/mL Resistant                     Will do bladder scan / and post void residual ; Dr Edouard in [place]   Will discharge her today on nitrofurantoin .  See urology in am

## 2024-06-08 NOTE — PLAN OF CARE
Problem: Adult Inpatient Plan of Care  Goal: Absence of Hospital-Acquired Illness or Injury  Outcome: Progressing     Problem: Adult Inpatient Plan of Care  Goal: Optimal Comfort and Wellbeing  Outcome: Progressing     Problem: Infection  Goal: Absence of Infection Signs and Symptoms  Outcome: Progressing

## 2024-06-08 NOTE — PLAN OF CARE
Problem: Adult Inpatient Plan of Care  Goal: Plan of Care Review  Outcome: Progressing     Problem: Infection  Goal: Absence of Infection Signs and Symptoms  Outcome: Progressing     Problem: Pain Acute  Goal: Optimal Pain Control and Function  Outcome: Progressing

## 2024-06-09 PROBLEM — R74.8 ELEVATED LIVER ENZYMES: Status: ACTIVE | Noted: 2024-06-09

## 2024-06-09 LAB
ALBUMIN SERPL BCP-MCNC: 3.5 G/DL (ref 3.5–5.2)
ALP SERPL-CCNC: 69 U/L (ref 55–135)
ALT SERPL W/O P-5'-P-CCNC: 196 U/L (ref 10–44)
ANION GAP SERPL CALC-SCNC: 9 MMOL/L (ref 8–16)
AST SERPL-CCNC: 47 U/L (ref 10–40)
BASOPHILS # BLD AUTO: 0.04 K/UL (ref 0–0.2)
BASOPHILS NFR BLD: 0.9 % (ref 0–1.9)
BILIRUB SERPL-MCNC: 0.2 MG/DL (ref 0.1–1)
BUN SERPL-MCNC: 7 MG/DL (ref 6–20)
CALCIUM SERPL-MCNC: 9.6 MG/DL (ref 8.7–10.5)
CHLORIDE SERPL-SCNC: 109 MMOL/L (ref 95–110)
CO2 SERPL-SCNC: 24 MMOL/L (ref 23–29)
CREAT SERPL-MCNC: 0.8 MG/DL (ref 0.5–1.4)
DIFFERENTIAL METHOD BLD: ABNORMAL
EOSINOPHIL # BLD AUTO: 0.3 K/UL (ref 0–0.5)
EOSINOPHIL NFR BLD: 5.7 % (ref 0–8)
ERYTHROCYTE [DISTWIDTH] IN BLOOD BY AUTOMATED COUNT: 13.9 % (ref 11.5–14.5)
EST. GFR  (NO RACE VARIABLE): >60 ML/MIN/1.73 M^2
GLUCOSE SERPL-MCNC: 101 MG/DL (ref 70–110)
HCT VFR BLD AUTO: 36.6 % (ref 37–48.5)
HGB BLD-MCNC: 11.7 G/DL (ref 12–16)
IMM GRANULOCYTES # BLD AUTO: 0.03 K/UL (ref 0–0.04)
IMM GRANULOCYTES NFR BLD AUTO: 0.7 % (ref 0–0.5)
LYMPHOCYTES # BLD AUTO: 2 K/UL (ref 1–4.8)
LYMPHOCYTES NFR BLD: 43.2 % (ref 18–48)
MCH RBC QN AUTO: 26.1 PG (ref 27–31)
MCHC RBC AUTO-ENTMCNC: 32 G/DL (ref 32–36)
MCV RBC AUTO: 82 FL (ref 82–98)
MONOCYTES # BLD AUTO: 0.5 K/UL (ref 0.3–1)
MONOCYTES NFR BLD: 9.9 % (ref 4–15)
NEUTROPHILS # BLD AUTO: 1.8 K/UL (ref 1.8–7.7)
NEUTROPHILS NFR BLD: 39.6 % (ref 38–73)
NRBC BLD-RTO: 0 /100 WBC
PLATELET # BLD AUTO: 315 K/UL (ref 150–450)
PMV BLD AUTO: 8.8 FL (ref 9.2–12.9)
POTASSIUM SERPL-SCNC: 4.2 MMOL/L (ref 3.5–5.1)
PROT SERPL-MCNC: 7.1 G/DL (ref 6–8.4)
RBC # BLD AUTO: 4.49 M/UL (ref 4–5.4)
SODIUM SERPL-SCNC: 142 MMOL/L (ref 136–145)
WBC # BLD AUTO: 4.54 K/UL (ref 3.9–12.7)

## 2024-06-09 PROCEDURE — 36415 COLL VENOUS BLD VENIPUNCTURE: CPT | Performed by: FAMILY MEDICINE

## 2024-06-09 PROCEDURE — 25000003 PHARM REV CODE 250: Performed by: FAMILY MEDICINE

## 2024-06-09 PROCEDURE — 63600175 PHARM REV CODE 636 W HCPCS: Performed by: STUDENT IN AN ORGANIZED HEALTH CARE EDUCATION/TRAINING PROGRAM

## 2024-06-09 PROCEDURE — 99900035 HC TECH TIME PER 15 MIN (STAT)

## 2024-06-09 PROCEDURE — 99232 SBSQ HOSP IP/OBS MODERATE 35: CPT | Mod: ,,, | Performed by: FAMILY MEDICINE

## 2024-06-09 PROCEDURE — 25000003 PHARM REV CODE 250: Performed by: STUDENT IN AN ORGANIZED HEALTH CARE EDUCATION/TRAINING PROGRAM

## 2024-06-09 PROCEDURE — 11000001 HC ACUTE MED/SURG PRIVATE ROOM

## 2024-06-09 PROCEDURE — 80053 COMPREHEN METABOLIC PANEL: CPT | Performed by: FAMILY MEDICINE

## 2024-06-09 PROCEDURE — 27000207 HC ISOLATION

## 2024-06-09 PROCEDURE — 99900031 HC PATIENT EDUCATION (STAT)

## 2024-06-09 PROCEDURE — 94761 N-INVAS EAR/PLS OXIMETRY MLT: CPT

## 2024-06-09 PROCEDURE — 85025 COMPLETE CBC W/AUTO DIFF WBC: CPT | Performed by: FAMILY MEDICINE

## 2024-06-09 RX ORDER — CLONAZEPAM 0.5 MG/1
0.5 TABLET ORAL 2 TIMES DAILY PRN
Status: DISCONTINUED | OUTPATIENT
Start: 2024-06-09 | End: 2024-06-10 | Stop reason: HOSPADM

## 2024-06-09 RX ORDER — IBUPROFEN 400 MG/1
400 TABLET ORAL EVERY 6 HOURS PRN
Status: DISCONTINUED | OUTPATIENT
Start: 2024-06-09 | End: 2024-06-10 | Stop reason: HOSPADM

## 2024-06-09 RX ADMIN — Medication 6 MG: at 12:06

## 2024-06-09 RX ADMIN — CLONAZEPAM 0.5 MG: 0.5 TABLET ORAL at 12:06

## 2024-06-09 RX ADMIN — MEROPENEM 500 MG: 500 INJECTION, POWDER, FOR SOLUTION INTRAVENOUS at 08:06

## 2024-06-09 RX ADMIN — Medication 6 MG: at 08:06

## 2024-06-09 RX ADMIN — MEROPENEM 500 MG: 500 INJECTION, POWDER, FOR SOLUTION INTRAVENOUS at 12:06

## 2024-06-09 RX ADMIN — MEROPENEM 500 MG: 500 INJECTION, POWDER, FOR SOLUTION INTRAVENOUS at 05:06

## 2024-06-09 NOTE — ASSESSMENT & PLAN NOTE
Likely related to tylenol 650q 6 that she has been getting   Hold tylenol. Will order motrin   CMP in am       6/10  ALT trending down   Needs out patient follow up

## 2024-06-09 NOTE — PLAN OF CARE
[place] [facility] (3rd Fl)  Discharge Assessment    Primary Care Provider: Sloane Kimbrough FNP     Discharge Assessment (most recent)       BRIEF DISCHARGE ASSESSMENT - 06/09/24 1159          Discharge Planning    Assessment Type Discharge Planning Brief Assessment (P)      Resource/Environmental Concerns none (P)      Support Systems Spouse/significant other;Parent;Family members;Friends/neighbors (P)      Equipment Currently Used at Home none (P)      Current Living Arrangements home (P)      Patient/Family Anticipates Transition to home (P)      Patient/Family Anticipated Services at Transition none (P)      DME Needed Upon Discharge  none (P)      Discharge Plan A Home (P)      Discharge Plan B Home with family (P)                      Discharge assessment completed at bedside with patient. No post acute needs determined at this time. SW to remain available if needs arise.

## 2024-06-09 NOTE — PROGRESS NOTES
formerly [facility]urg ([facility])  [facility] Medicine  Progress Note    Patient Name: Zheng Beverly  MRN: [id number]  Patient Class: IP- Inpatient   Admission Date: 2024  Length of Stay: 2 days  Attending Physician: Farhat Cannon MD  Primary Care Provider: Sloane Kimbrough FNP        Subjective:     Principal Problem:Pyelonephritis        HPI:  Patient is a 28 year old female who presented to the ED with severe left flank pain.  She was diagnosed with UTI/pyelonephritis several days ago and received cipro.  CT renal stone study showed perinephric stranding of the left kidney at that time.  Final urinary culture positive for ESBL Ecoli not sensitive to cipro.  She has had fever but denies CP, SOB, nausea and vomiting.  She has had multiple UTIs growing up and when she was a teenager she saw a urologist and was told she doesn't empty her bladder completely.  She has an appt with Dr. Belcher for this upcoming Tuesday         Admitted for MDR pyelonephritis.      Overview/Hospital Course:  AFVSS (low grade temp, no true fever)   Flank pain about the same       AFVSS   Some nausea last night. No fever   CVA pain slightly improved.         History reviewed. No pertinent past medical history.    Past Surgical History:   Procedure Laterality Date     SECTION         Review of patient's allergies indicates:  No Known Allergies    No current facility-administered medications on file prior to encounter.     Current Outpatient Medications on File Prior to Encounter   Medication Sig    acetaminophen (TYLENOL) 500 MG tablet Take 500 mg by mouth every 6 (six) hours as needed for Pain.    ciprofloxacin HCl (CIPRO) 500 MG tablet Take 1 tablet (500 mg total) by mouth 2 (two) times daily. for 10 days    multivit-iron-FA-calcium-mins (WOMEN'S DAILY FORMULA) 18 mg iron-400 mcg-500 mg Ca Tab Take 1 tablet by mouth once daily.     Family History    None       Tobacco Use    Smoking status: Never    Smokeless tobacco: Never    Substance and Sexual Activity    Alcohol use: Not on file    Drug use: Not on file    Sexual activity: Not on file     Review of Systems   Constitutional:  Negative for chills and fever.   Respiratory:  Negative for shortness of breath.    Cardiovascular:  Negative for chest pain and palpitations.   Gastrointestinal:  Positive for nausea. Negative for abdominal pain, blood in stool and constipation.   Genitourinary:  Positive for flank pain. Negative for difficulty urinating.   Psychiatric/Behavioral:  Negative for dysphoric mood, sleep disturbance and suicidal ideas. The patient is not nervous/anxious.      Objective:     Vital Signs (Most Recent):  Temp: 97.9 °F (36.6 °C) (06/09/24 0840)  Pulse: 76 (06/09/24 0840)  Resp: 18 (06/09/24 0840)  BP: 116/72 (06/09/24 0840)  SpO2: 98 % (06/09/24 0840) Vital Signs (24h Range):  Temp:  [97.5 °F (36.4 °C)-99 °F (37.2 °C)] 97.9 °F (36.6 °C)  Pulse:  [64-80] 76  Resp:  [18-20] 18  SpO2:  [97 %-99 %] 98 %  BP: (112-125)/(63-88) 116/72     Weight: 69.2 kg (152 lb 8.9 oz)  Body mass index is 30.81 kg/m².     Physical Exam  Constitutional:       Appearance: She is well-developed.   HENT:      Head: Normocephalic and atraumatic.      Right Ear: External ear normal.      Left Ear: External ear normal.      Nose: Nose normal.   Eyes:      Pupils: Pupils are equal, round, and reactive to light.   Neck:      Thyroid: No thyromegaly.      Vascular: No JVD.      Trachea: No tracheal deviation.   Cardiovascular:      Rate and Rhythm: Normal rate.      Heart sounds: Normal heart sounds. No murmur heard.  Pulmonary:      Effort: Pulmonary effort is normal. No respiratory distress.      Breath sounds: Normal breath sounds. No wheezing or rales.   Chest:      Chest wall: No tenderness.   Abdominal:      General: Bowel sounds are normal. There is no distension.      Palpations: Abdomen is soft. There is no mass.      Tenderness: There is no abdominal tenderness. There is no guarding or  "rebound.   Genitourinary:     Comments: Positive CVA TTP   Musculoskeletal:         General: No tenderness. Normal range of motion.      Cervical back: Normal range of motion and neck supple.   Lymphadenopathy:      Cervical: No cervical adenopathy.   Skin:     General: Skin is warm and dry.      Coloration: Skin is not pale.      Findings: No erythema or rash.   Neurological:      Mental Status: She is alert and oriented to person, place, and time.      Cranial Nerves: No cranial nerve deficit.      Motor: No abnormal muscle tone.      Coordination: Coordination normal.      Deep Tendon Reflexes: Reflexes are normal and symmetric. Reflexes normal.   Psychiatric:         Behavior: Behavior normal.         Thought Content: Thought content normal.         Judgment: Judgment normal.              CRANIAL NERVES     CN III, IV, VI   Pupils are equal, round, and reactive to light.       Significant Labs: All pertinent labs within the past 24 hours have been reviewed.  Blood Culture:   Recent Labs   Lab 06/07/24  1310 06/07/24  1317   LABBLOO No Growth to date  No Growth to date No Growth to date  No Growth to date     CBC:   Recent Labs   Lab 06/07/24  1158 06/09/24  0443   WBC 5.75 4.54   HGB 12.2 11.7*   HCT 36.8* 36.6*    315     CMP:   Recent Labs   Lab 06/07/24  1158 06/09/24  0443    142   K 4.0 4.2    109   CO2 22* 24   GLU 90 101   BUN 8 7   CREATININE 0.9 0.8   CALCIUM 10.0 9.6   PROT 7.9 7.1   ALBUMIN 4.1 3.5   BILITOT 0.3 0.2   ALKPHOS 58 69   AST 18 47*   ALT 44 196*   ANIONGAP 11 9     Urine Culture: No results for input(s): "LABURIN" in the last 48 hours.  Urine Studies:   Recent Labs   Lab 06/07/24  1150   COLORU Yellow   APPEARANCEUA Clear   PHUR 6.0   SPECGRAV 1.015   PROTEINUA Negative   GLUCUA Negative   KETONESU Negative   BILIRUBINUA Negative   OCCULTUA Negative   NITRITE Negative   UROBILINOGEN Negative   LEUKOCYTESUR Trace*   RBCUA 0   WBCUA 10*   BACTERIA Moderate* "   HYALINECASTS 0       Significant Imaging: I have reviewed all pertinent imaging results/findings within the past 24 hours.    Assessment/Plan:      * Pyelonephritis  Recent CT scan with perinephric stranding  IV meropenem day 3 based on recent urine culture ecoli ESBL  Pain control   Her urine is marginal, as is her CT. Her pyelo is not severe and may be more chronic changes we are seeing on CT?? But she has recurrent UTI's with ESBL.  She needs to complete a course of Meropenem. I would consider at least 5 days appropriate   Pt has a urology apt on Tuesday. Will consult Dr. Belcher to see her tomorrow.         VTE Risk Mitigation (From admission, onward)           Ordered     IP VTE HIGH RISK PATIENT  Once         06/07/24 1413     Place sequential compression device  Until discontinued         06/07/24 1413                    Discharge Planning   MYLES:      Code Status: Full Code   Is the patient medically ready for discharge?:     Reason for patient still in hospital (select all that apply): Treatment                     Farhat Cannon MD  Department of Hospital Medicine   [place] [facility]urg (3rd Fl)

## 2024-06-09 NOTE — PLAN OF CARE
Problem: Adult Inpatient Plan of Care  Goal: Absence of Hospital-Acquired Illness or Injury  Outcome: Progressing     Problem: Adult Inpatient Plan of Care  Goal: Optimal Comfort and Wellbeing  Outcome: Progressing     Problem: Infection  Goal: Absence of Infection Signs and Symptoms  Outcome: Progressing     Problem: Pain Acute  Goal: Optimal Pain Control and Function  Outcome: Progressing

## 2024-06-09 NOTE — SUBJECTIVE & OBJECTIVE
History reviewed. No pertinent past medical history.    Past Surgical History:   Procedure Laterality Date     SECTION         Review of patient's allergies indicates:  No Known Allergies    No current facility-administered medications on file prior to encounter.     Current Outpatient Medications on File Prior to Encounter   Medication Sig    acetaminophen (TYLENOL) 500 MG tablet Take 500 mg by mouth every 6 (six) hours as needed for Pain.    ciprofloxacin HCl (CIPRO) 500 MG tablet Take 1 tablet (500 mg total) by mouth 2 (two) times daily. for 10 days    multivit-iron-FA-calcium-mins (WOMEN'S DAILY FORMULA) 18 mg iron-400 mcg-500 mg Ca Tab Take 1 tablet by mouth once daily.     Family History    None       Tobacco Use    Smoking status: Never    Smokeless tobacco: Never   Substance and Sexual Activity    Alcohol use: Not on file    Drug use: Not on file    Sexual activity: Not on file     Review of Systems   Constitutional:  Negative for chills and fever.   Respiratory:  Negative for shortness of breath.    Cardiovascular:  Negative for chest pain and palpitations.   Gastrointestinal:  Positive for nausea. Negative for abdominal pain, blood in stool and constipation.   Genitourinary:  Positive for flank pain. Negative for difficulty urinating.   Psychiatric/Behavioral:  Negative for dysphoric mood, sleep disturbance and suicidal ideas. The patient is not nervous/anxious.      Objective:     Vital Signs (Most Recent):  Temp: 97.9 °F (36.6 °C) (24 0840)  Pulse: 76 (24 0840)  Resp: 18 (24 0840)  BP: 116/72 (24 0840)  SpO2: 98 % (24 0840) Vital Signs (24h Range):  Temp:  [97.5 °F (36.4 °C)-99 °F (37.2 °C)] 97.9 °F (36.6 °C)  Pulse:  [64-80] 76  Resp:  [18-20] 18  SpO2:  [97 %-99 %] 98 %  BP: (112-125)/(63-88) 116/72     Weight: 69.2 kg (152 lb 8.9 oz)  Body mass index is 30.81 kg/m².     Physical Exam  Constitutional:       Appearance: She is well-developed.   HENT:      Head:  Normocephalic and atraumatic.      Right Ear: External ear normal.      Left Ear: External ear normal.      Nose: Nose normal.   Eyes:      Pupils: Pupils are equal, round, and reactive to light.   Neck:      Thyroid: No thyromegaly.      Vascular: No JVD.      Trachea: No tracheal deviation.   Cardiovascular:      Rate and Rhythm: Normal rate.      Heart sounds: Normal heart sounds. No murmur heard.  Pulmonary:      Effort: Pulmonary effort is normal. No respiratory distress.      Breath sounds: Normal breath sounds. No wheezing or rales.   Chest:      Chest wall: No tenderness.   Abdominal:      General: Bowel sounds are normal. There is no distension.      Palpations: Abdomen is soft. There is no mass.      Tenderness: There is no abdominal tenderness. There is no guarding or rebound.   Genitourinary:     Comments: Positive CVA TTP   Musculoskeletal:         General: No tenderness. Normal range of motion.      Cervical back: Normal range of motion and neck supple.   Lymphadenopathy:      Cervical: No cervical adenopathy.   Skin:     General: Skin is warm and dry.      Coloration: Skin is not pale.      Findings: No erythema or rash.   Neurological:      Mental Status: She is alert and oriented to person, place, and time.      Cranial Nerves: No cranial nerve deficit.      Motor: No abnormal muscle tone.      Coordination: Coordination normal.      Deep Tendon Reflexes: Reflexes are normal and symmetric. Reflexes normal.   Psychiatric:         Behavior: Behavior normal.         Thought Content: Thought content normal.         Judgment: Judgment normal.              CRANIAL NERVES     CN III, IV, VI   Pupils are equal, round, and reactive to light.       Significant Labs: All pertinent labs within the past 24 hours have been reviewed.  Blood Culture:   Recent Labs   Lab 06/07/24  1310 06/07/24  1317   LABBLOO No Growth to date  No Growth to date No Growth to date  No Growth to date     CBC:   Recent Labs   Lab  "06/07/24  1158 06/09/24  0443   WBC 5.75 4.54   HGB 12.2 11.7*   HCT 36.8* 36.6*    315     CMP:   Recent Labs   Lab 06/07/24  1158 06/09/24  0443    142   K 4.0 4.2    109   CO2 22* 24   GLU 90 101   BUN 8 7   CREATININE 0.9 0.8   CALCIUM 10.0 9.6   PROT 7.9 7.1   ALBUMIN 4.1 3.5   BILITOT 0.3 0.2   ALKPHOS 58 69   AST 18 47*   ALT 44 196*   ANIONGAP 11 9     Urine Culture: No results for input(s): "LABURIN" in the last 48 hours.  Urine Studies:   Recent Labs   Lab 06/07/24  1150   COLORU Yellow   APPEARANCEUA Clear   PHUR 6.0   SPECGRAV 1.015   PROTEINUA Negative   GLUCUA Negative   KETONESU Negative   BILIRUBINUA Negative   OCCULTUA Negative   NITRITE Negative   UROBILINOGEN Negative   LEUKOCYTESUR Trace*   RBCUA 0   WBCUA 10*   BACTERIA Moderate*   HYALINECASTS 0       Significant Imaging: I have reviewed all pertinent imaging results/findings within the past 24 hours.  "

## 2024-06-09 NOTE — ASSESSMENT & PLAN NOTE
Recent CT scan with perinephric stranding  IV meropenem day 3 based on recent urine culture ecoli ESBL  Pain control   Her urine is marginal, as is her CT. Her pyelo is not severe and may be more chronic changes we are seeing on CT?? But she has recurrent UTI's with ESBL.  She needs to complete a course of Meropenem. I would consider at least 5 days appropriate   Pt has a urology apt on Tuesday. Will consult Dr. Belcher to see her tomorrow.

## 2024-06-10 VITALS
DIASTOLIC BLOOD PRESSURE: 74 MMHG | HEIGHT: 59 IN | WEIGHT: 152.56 LBS | OXYGEN SATURATION: 97 % | BODY MASS INDEX: 30.76 KG/M2 | TEMPERATURE: 98 F | RESPIRATION RATE: 18 BRPM | SYSTOLIC BLOOD PRESSURE: 106 MMHG | HEART RATE: 72 BPM

## 2024-06-10 PROBLEM — N39.0 E-COLI UTI: Status: ACTIVE | Noted: 2024-06-10

## 2024-06-10 PROBLEM — B96.20 E-COLI UTI: Status: ACTIVE | Noted: 2024-06-10

## 2024-06-10 LAB
ALBUMIN SERPL BCP-MCNC: 3.6 G/DL (ref 3.5–5.2)
ALP SERPL-CCNC: 61 U/L (ref 55–135)
ALT SERPL W/O P-5'-P-CCNC: 140 U/L (ref 10–44)
ANION GAP SERPL CALC-SCNC: 11 MMOL/L (ref 8–16)
AST SERPL-CCNC: 22 U/L (ref 10–40)
BILIRUB SERPL-MCNC: 0.3 MG/DL (ref 0.1–1)
BUN SERPL-MCNC: 9 MG/DL (ref 6–20)
CALCIUM SERPL-MCNC: 9.5 MG/DL (ref 8.7–10.5)
CHLORIDE SERPL-SCNC: 106 MMOL/L (ref 95–110)
CO2 SERPL-SCNC: 24 MMOL/L (ref 23–29)
CREAT SERPL-MCNC: 0.8 MG/DL (ref 0.5–1.4)
EST. GFR  (NO RACE VARIABLE): >60 ML/MIN/1.73 M^2
GLUCOSE SERPL-MCNC: 93 MG/DL (ref 70–110)
POTASSIUM SERPL-SCNC: 4.2 MMOL/L (ref 3.5–5.1)
PROT SERPL-MCNC: 7 G/DL (ref 6–8.4)
SODIUM SERPL-SCNC: 141 MMOL/L (ref 136–145)

## 2024-06-10 PROCEDURE — 25000003 PHARM REV CODE 250: Performed by: FAMILY MEDICINE

## 2024-06-10 PROCEDURE — 80053 COMPREHEN METABOLIC PANEL: CPT | Performed by: FAMILY MEDICINE

## 2024-06-10 PROCEDURE — 99239 HOSP IP/OBS DSCHRG MGMT >30: CPT | Mod: ,,, | Performed by: INTERNAL MEDICINE

## 2024-06-10 PROCEDURE — 36415 COLL VENOUS BLD VENIPUNCTURE: CPT | Performed by: FAMILY MEDICINE

## 2024-06-10 PROCEDURE — 25000003 PHARM REV CODE 250: Performed by: STUDENT IN AN ORGANIZED HEALTH CARE EDUCATION/TRAINING PROGRAM

## 2024-06-10 PROCEDURE — 94760 N-INVAS EAR/PLS OXIMETRY 1: CPT

## 2024-06-10 PROCEDURE — 63600175 PHARM REV CODE 636 W HCPCS: Performed by: STUDENT IN AN ORGANIZED HEALTH CARE EDUCATION/TRAINING PROGRAM

## 2024-06-10 RX ORDER — NITROFURANTOIN 25; 75 MG/1; MG/1
100 CAPSULE ORAL DAILY
Qty: 10 CAPSULE | Refills: 0 | Status: SHIPPED | OUTPATIENT
Start: 2024-06-10 | End: 2024-06-20

## 2024-06-10 RX ADMIN — MEROPENEM 500 MG: 500 INJECTION, POWDER, FOR SOLUTION INTRAVENOUS at 05:06

## 2024-06-10 RX ADMIN — IBUPROFEN 400 MG: 400 TABLET, FILM COATED ORAL at 05:06

## 2024-06-10 RX ADMIN — MEROPENEM 500 MG: 500 INJECTION, POWDER, FOR SOLUTION INTRAVENOUS at 01:06

## 2024-06-10 NOTE — SUBJECTIVE & OBJECTIVE
Review of Systems   Constitutional:  Negative for chills and fever.   Respiratory:  Negative for shortness of breath.    Cardiovascular:  Negative for chest pain and palpitations.   Gastrointestinal:  Negative for abdominal pain, blood in stool and constipation.   Genitourinary:  Negative for difficulty urinating.   Psychiatric/Behavioral:  Negative for dysphoric mood, sleep disturbance and suicidal ideas. The patient is not nervous/anxious.      Objective:     Vital Signs (Most Recent):  Temp: 97.6 °F (36.4 °C) (06/10/24 0754)  Pulse: 70 (06/10/24 0754)  Resp: 18 (06/10/24 0754)  BP: 107/71 (06/10/24 0754)  SpO2: 99 % (06/10/24 0754) Vital Signs (24h Range):  Temp:  [97.5 °F (36.4 °C)-99 °F (37.2 °C)] 97.6 °F (36.4 °C)  Pulse:  [62-74] 70  Resp:  [18-20] 18  SpO2:  [98 %-99 %] 99 %  BP: (107-121)/(65-78) 107/71     Weight: 69.2 kg (152 lb 8.9 oz)  Body mass index is 30.81 kg/m².    Intake/Output Summary (Last 24 hours) at 6/10/2024 0921  Last data filed at 6/10/2024 0616  Gross per 24 hour   Intake 1845.15 ml   Output --   Net 1845.15 ml         Physical Exam  Vitals and nursing note reviewed.   Constitutional:       Appearance: She is well-developed.   HENT:      Head: Normocephalic and atraumatic.      Right Ear: External ear normal.      Left Ear: External ear normal.   Eyes:      Conjunctiva/sclera: Conjunctivae normal.      Pupils: Pupils are equal, round, and reactive to light.   Neck:      Thyroid: No thyromegaly.      Vascular: No JVD.      Trachea: No tracheal deviation.   Cardiovascular:      Rate and Rhythm: Normal rate and regular rhythm.      Heart sounds: Normal heart sounds.   Pulmonary:      Effort: Pulmonary effort is normal. No respiratory distress.      Breath sounds: Normal breath sounds. No wheezing or rales.   Chest:      Chest wall: No tenderness.   Abdominal:      General: Bowel sounds are normal. There is no distension.      Palpations: Abdomen is soft. There is no mass.       "Tenderness: There is no abdominal tenderness. There is no guarding or rebound.   Musculoskeletal:         General: Normal range of motion.      Cervical back: Normal range of motion and neck supple.   Lymphadenopathy:      Cervical: No cervical adenopathy.   Skin:     General: Skin is warm and dry.   Neurological:      Mental Status: She is alert and oriented to person, place, and time.      Cranial Nerves: No cranial nerve deficit.      Motor: No abnormal muscle tone.      Coordination: Coordination normal.      Deep Tendon Reflexes: Reflexes are normal and symmetric. Reflexes normal.      Comments: CN: Optic discs are flat with normal vasculature, PERRL, Extraoccular movements and visual fields are full. Normal facial sensation and strength, Hearing symmetric, Tongue and Palate are midline and strong. Shoulder Shrug symmetric and strong.             Significant Labs: All pertinent labs within the past 24 hours have been reviewed.  CBC:   Recent Labs   Lab 06/09/24  0443   WBC 4.54   HGB 11.7*   HCT 36.6*        CMP:   Recent Labs   Lab 06/09/24  0443 06/10/24  0431    141   K 4.2 4.2    106   CO2 24 24    93   BUN 7 9   CREATININE 0.8 0.8   CALCIUM 9.6 9.5   PROT 7.1 7.0   ALBUMIN 3.5 3.6   BILITOT 0.2 0.3   ALKPHOS 69 61   AST 47* 22   * 140*   ANIONGAP 9 11     Urine Culture: No results for input(s): "LABURIN" in the last 48 hours.  Urine Studies: No results for input(s): "COLORU", "APPEARANCEUA", "PHUR", "SPECGRAV", "PROTEINUA", "GLUCUA", "KETONESU", "BILIRUBINUA", "OCCULTUA", "NITRITE", "UROBILINOGEN", "LEUKOCYTESUR", "RBCUA", "WBCUA", "BACTERIA", "SQUAMEPITHEL", "HYALINECASTS" in the last 48 hours.    Invalid input(s): "WRIGHTSUR"    Significant Imaging: I have reviewed all pertinent imaging results/findings within the past 24 hours.  CT: I have reviewed all pertinent results/findings within the past 24 hours and my personal findings are:  reviewed.      There is a lobulated " contour of the left kidney which is atrophic.  There does appear to be some subtle left-sided perinephric inflammation which could suggest a pyelonephritis and correlation with urinalysis is recommended.     No nephrolithiasis, ureterolithiasis, hydronephrosis or hydroureter.     Mild splenomegaly.     Constipation.

## 2024-06-10 NOTE — DISCHARGE SUMMARY
[facility] ([facility])  [facility] Medicine  Discharge Summary      Patient Name: Zheng Beverly  MRN: [id number]  ROCHELLE: [id number]  Patient Class: IP- Inpatient  Admission Date: 6/7/2024  Hospital Length of Stay: 3 days  Discharge Date and Time:  06/10/2024 9:27 AM  Attending Physician: Farhat Cannon MD   Discharging Provider: Garrick Riojas MD  Primary Care Provider: Sloane Kimbrough FNP    Primary Care Team: Networked reference to record PCT     HPI:   Patient is a 28 year old female who presented to the ED with severe left flank pain.  She was diagnosed with UTI/pyelonephritis several days ago and received cipro.  CT renal stone study showed perinephric stranding of the left kidney at that time.  Final urinary culture positive for ESBL Ecoli not sensitive to cipro.  She has had fever but denies CP, SOB, nausea and vomiting.  She has had multiple UTIs growing up and when she was a teenager she saw a urologist and was told she doesn't empty her bladder completely.  She has an appt with Dr. Belcher for this upcoming Tuesday         Admitted for MDR pyelonephritis.      * No surgery found *      Hospital Course:   AFVSS (low grade temp, no true fever)   Flank pain about the same     6/9  AFVSS   Some nausea last night. No fever   CVA pain slightly improved.     6/10  Doing well   Afebrile   No white count      Abnormal   ESCHERICHIA COLI ESBL  >100,000 cfu/ml     Resulting Agency [facility]        Susceptibility     Escherichia coli ESBL     CULTURE, URINE     Amikacin <=16 mcg/mL Sensitive     Amox/K Clav'ate <=8/4 mcg/mL Resistant     Amp/Sulbactam 16/8 mcg/mL Resistant     Ampicillin >16 mcg/mL Resistant     Cefazolin >16 mcg/mL Resistant     Cefepime >16 mcg/mL Resistant     Ceftriaxone >32 mcg/mL Resistant     Ciprofloxacin >2 mcg/mL Resistant     Ertapenem <=0.5 mcg/mL Sensitive     Gentamicin >8 mcg/mL Resistant     Levofloxacin >4 mcg/mL Resistant     Meropenem <=1 mcg/mL Sensitive     Nitrofurantoin <=32  mcg/mL Sensitive     Piperacillin/Tazo <=16 mcg/mL Resistant     Tobramycin <=4 mcg/mL Sensitive     Trimeth/Sulfa >2/38 mcg/mL Resistant                     Will do bladder scan / and post void residual ; Dr Edouard in [place]   Will discharge her today on nitrofurantoin .  See urology in am                Goals of Care Treatment Preferences:  Code Status: Full Code      Consults:   Consults (From admission, onward)          Status Ordering Provider     Inpatient consult to Urology  Once        Provider:  (Not yet assigned)    CARLEE Peguero            No new Assessment & Plan notes have been filed under this hospital service since the last note was generated.  Service: Hospital Medicine    Final Active Diagnoses:    Diagnosis Date Noted POA    PRINCIPAL PROBLEM:  Pyelonephritis [N12] 06/07/2024 Yes    E-coli UTI [N39.0, B96.20] 06/10/2024 Yes    Elevated liver enzymes [R74.8] 06/09/2024 Yes      Problems Resolved During this Admission:       Discharged Condition: good    Disposition:     Follow Up:   Follow-up Information       Sloane Kimbrough FNP. Schedule an appointment as soon as possible for a visit in 2 days.    Specialty: Family Medicine  Contact information:  [address] [address]  [address] [place] [id number]  [phone]               [facility] - Emergency Dept .    Specialty: Emergency Medicine  Why: If symptoms worsen  Contact information:  [address] [place] [id number] [phone]             Klever Alvarado, MUNDO Follow up on 6/18/2024.    Specialty: Urgent Care  Why: @ 10:45  Contact information:  [address] [facility] [id number] [phone]                           Patient Instructions:   No discharge procedures on file.    Significant Diagnostic Studies: Labs: CBC   Recent Labs   Lab 06/09/24  0443   WBC 4.54   HGB 11.7*   HCT 36.6*        Microbiology: Urine Culture    Lab Results   Component Value Date    LABURIN ESCHERICHIA COLI  ESBL  >100,000 cfu/ml   (A) 06/03/2024       Pending Diagnostic Studies:       None           Medications:  Reconciled Home Medications:      Medication List        START taking these medications      nitrofurantoin (macrocrystal-monohydrate) 100 MG capsule  Commonly known as: MACROBID  Take 1 capsule (100 mg total) by mouth Daily. for 10 days            STOP taking these medications      acetaminophen 500 MG tablet  Commonly known as: TYLENOL     ciprofloxacin HCl 500 MG tablet  Commonly known as: CIPRO     WOMEN'S DAILY FORMULA 18 mg iron-400 mcg-500 mg Ca Tab  Generic drug: multivit-iron-FA-calcium-mins              Indwelling Lines/Drains at time of discharge:   Lines/Drains/Airways       None                   Time spent on the discharge of patient: 35 minutes         Garrick Riojas MD  Department of Hospital Medicine  [place] [facility] (3rd Fl)

## 2024-06-10 NOTE — PROGRESS NOTES
[facility] ([facility])  [facility] Medicine  Progress Note    Patient Name: Zheng Beverly  MRN: [id number]  Patient Class: IP- Inpatient   Admission Date: 6/7/2024  Length of Stay: 3 days  Attending Physician: Farhat Cannon MD  Primary Care Provider: Sloane Kimbrough FNP        Subjective:     Principal Problem:Pyelonephritis        HPI:  Patient is a 28 year old female who presented to the ED with severe left flank pain.  She was diagnosed with UTI/pyelonephritis several days ago and received cipro.  CT renal stone study showed perinephric stranding of the left kidney at that time.  Final urinary culture positive for ESBL Ecoli not sensitive to cipro.  She has had fever but denies CP, SOB, nausea and vomiting.  She has had multiple UTIs growing up and when she was a teenager she saw a urologist and was told she doesn't empty her bladder completely.  She has an appt with Dr. Belcher for this upcoming Tuesday         Admitted for MDR pyelonephritis.      Overview/Hospital Course:  AFVSS (low grade temp, no true fever)   Flank pain about the same     6/9  AFVSS   Some nausea last night. No fever   CVA pain slightly improved.     6/10  Doing well   Afebrile   No white count      Abnormal   ESCHERICHIA COLI ESBL  >100,000 cfu/ml     Resulting Agency [facility]        Susceptibility     Escherichia coli ESBL     CULTURE, URINE     Amikacin <=16 mcg/mL Sensitive     Amox/K Clav'ate <=8/4 mcg/mL Resistant     Amp/Sulbactam 16/8 mcg/mL Resistant     Ampicillin >16 mcg/mL Resistant     Cefazolin >16 mcg/mL Resistant     Cefepime >16 mcg/mL Resistant     Ceftriaxone >32 mcg/mL Resistant     Ciprofloxacin >2 mcg/mL Resistant     Ertapenem <=0.5 mcg/mL Sensitive     Gentamicin >8 mcg/mL Resistant     Levofloxacin >4 mcg/mL Resistant     Meropenem <=1 mcg/mL Sensitive     Nitrofurantoin <=32 mcg/mL Sensitive     Piperacillin/Tazo <=16 mcg/mL Resistant     Tobramycin <=4 mcg/mL Sensitive     Trimeth/Sulfa >2/38 mcg/mL Resistant                      Will do bladder scan / and post void residual ; Dr Edouard in [place]   Will discharge her today on nitrofurantoin .  See urology in am                   Review of Systems   Constitutional:  Negative for chills and fever.   Respiratory:  Negative for shortness of breath.    Cardiovascular:  Negative for chest pain and palpitations.   Gastrointestinal:  Negative for abdominal pain, blood in stool and constipation.   Genitourinary:  Negative for difficulty urinating.   Psychiatric/Behavioral:  Negative for dysphoric mood, sleep disturbance and suicidal ideas. The patient is not nervous/anxious.      Objective:     Vital Signs (Most Recent):  Temp: 97.6 °F (36.4 °C) (06/10/24 0754)  Pulse: 70 (06/10/24 0754)  Resp: 18 (06/10/24 0754)  BP: 107/71 (06/10/24 0754)  SpO2: 99 % (06/10/24 0754) Vital Signs (24h Range):  Temp:  [97.5 °F (36.4 °C)-99 °F (37.2 °C)] 97.6 °F (36.4 °C)  Pulse:  [62-74] 70  Resp:  [18-20] 18  SpO2:  [98 %-99 %] 99 %  BP: (107-121)/(65-78) 107/71     Weight: 69.2 kg (152 lb 8.9 oz)  Body mass index is 30.81 kg/m².    Intake/Output Summary (Last 24 hours) at 6/10/2024 0921  Last data filed at 6/10/2024 0616  Gross per 24 hour   Intake 1845.15 ml   Output --   Net 1845.15 ml         Physical Exam  Vitals and nursing note reviewed.   Constitutional:       Appearance: She is well-developed.   HENT:      Head: Normocephalic and atraumatic.      Right Ear: External ear normal.      Left Ear: External ear normal.   Eyes:      Conjunctiva/sclera: Conjunctivae normal.      Pupils: Pupils are equal, round, and reactive to light.   Neck:      Thyroid: No thyromegaly.      Vascular: No JVD.      Trachea: No tracheal deviation.   Cardiovascular:      Rate and Rhythm: Normal rate and regular rhythm.      Heart sounds: Normal heart sounds.   Pulmonary:      Effort: Pulmonary effort is normal. No respiratory distress.      Breath sounds: Normal breath sounds. No wheezing or rales.   Chest:  "     Chest wall: No tenderness.   Abdominal:      General: Bowel sounds are normal. There is no distension.      Palpations: Abdomen is soft. There is no mass.      Tenderness: There is no abdominal tenderness. There is no guarding or rebound.   Musculoskeletal:         General: Normal range of motion.      Cervical back: Normal range of motion and neck supple.   Lymphadenopathy:      Cervical: No cervical adenopathy.   Skin:     General: Skin is warm and dry.   Neurological:      Mental Status: She is alert and oriented to person, place, and time.      Cranial Nerves: No cranial nerve deficit.      Motor: No abnormal muscle tone.      Coordination: Coordination normal.      Deep Tendon Reflexes: Reflexes are normal and symmetric. Reflexes normal.      Comments: CN: Optic discs are flat with normal vasculature, PERRL, Extraoccular movements and visual fields are full. Normal facial sensation and strength, Hearing symmetric, Tongue and Palate are midline and strong. Shoulder Shrug symmetric and strong.             Significant Labs: All pertinent labs within the past 24 hours have been reviewed.  CBC:   Recent Labs   Lab 06/09/24  0443   WBC 4.54   HGB 11.7*   HCT 36.6*        CMP:   Recent Labs   Lab 06/09/24 0443 06/10/24  0431    141   K 4.2 4.2    106   CO2 24 24    93   BUN 7 9   CREATININE 0.8 0.8   CALCIUM 9.6 9.5   PROT 7.1 7.0   ALBUMIN 3.5 3.6   BILITOT 0.2 0.3   ALKPHOS 69 61   AST 47* 22   * 140*   ANIONGAP 9 11     Urine Culture: No results for input(s): "LABURIN" in the last 48 hours.  Urine Studies: No results for input(s): "COLORU", "APPEARANCEUA", "PHUR", "SPECGRAV", "PROTEINUA", "GLUCUA", "KETONESU", "BILIRUBINUA", "OCCULTUA", "NITRITE", "UROBILINOGEN", "LEUKOCYTESUR", "RBCUA", "WBCUA", "BACTERIA", "SQUAMEPITHEL", "HYALINECASTS" in the last 48 hours.    Invalid input(s): "WRIGHTSUR"    Significant Imaging: I have reviewed all pertinent imaging results/findings within " the past 24 hours.  CT: I have reviewed all pertinent results/findings within the past 24 hours and my personal findings are:  reviewed.      There is a lobulated contour of the left kidney which is atrophic.  There does appear to be some subtle left-sided perinephric inflammation which could suggest a pyelonephritis and correlation with urinalysis is recommended.     No nephrolithiasis, ureterolithiasis, hydronephrosis or hydroureter.     Mild splenomegaly.     Constipation.    Assessment/Plan:      * Pyelonephritis  Recent CT scan with perinephric stranding  IV meropenem day 3 based on recent urine culture ecoli ESBL  Pain control   Her urine is marginal, as is her CT. Her pyelo is not severe and may be more chronic changes we are seeing on CT?? But she has recurrent UTI's with ESBL.  She needs to complete a course of Meropenem. I would consider at least 5 days appropriate   Pt has a urology apt on Tuesday. Will consult Dr. Belcher to see her tomorrow.       6/10  Discharge home on nitrofurantoin   See urology in am   Check bladder scan and postvoid urine     E-coli UTI  Will send home on nitrofurantoin   Multidrug resistant E COLI       Elevated liver enzymes  Likely related to tylenol 650q 6 that she has been getting   Hold tylenol. Will order motrin   CMP in am       6/10  ALT trending down   Needs out patient follow up       VTE Risk Mitigation (From admission, onward)           Ordered     IP VTE HIGH RISK PATIENT  Once         06/07/24 1413     Place sequential compression device  Until discontinued         06/07/24 1413                    Discharge Planning   MYLES:      Code Status: Full Code   Is the patient medically ready for discharge?:     Reason for patient still in hospital (select all that apply): Treatment  Discharge Plan A: Home                  Garrick Riojas MD  Department of Hospital Medicine   [place] - [facility] (3rd Fl)

## 2024-06-10 NOTE — PLAN OF CARE
Problem: Adult Inpatient Plan of Care  Goal: Plan of Care Review  Outcome: Met     Problem: Infection  Goal: Absence of Infection Signs and Symptoms  Outcome: Progressing     Problem: Pain Acute  Goal: Optimal Pain Control and Function  Outcome: Progressing

## 2024-06-10 NOTE — ASSESSMENT & PLAN NOTE
Recent CT scan with perinephric stranding  IV meropenem day 3 based on recent urine culture ecoli ESBL  Pain control   Her urine is marginal, as is her CT. Her pyelo is not severe and may be more chronic changes we are seeing on CT?? But she has recurrent UTI's with ESBL.  She needs to complete a course of Meropenem. I would consider at least 5 days appropriate   Pt has a urology apt on Tuesday. Will consult Dr. Belcher to see her tomorrow.       6/10  Discharge home on nitrofurantoin   See urology in am   Check bladder scan and postvoid urine

## 2024-06-10 NOTE — PLAN OF CARE
[facility] - Med Surg (3rd Fl)  Discharge Final Note    Primary Care Provider: Sloane Kimbrough FNP    Expected Discharge Date: 6/10/2024    Final Discharge Note (most recent)       Final Note - 06/10/24 1203          Final Note    Assessment Type Final Discharge Note     Anticipated Discharge Disposition Home or Self Care     What phone number can be called within the next 1-3 days to see how you are doing after discharge? [phone]     Hospital Resources/Appts/Education Provided Provided education on problems/symptoms using teachback;Appointments scheduled and added to AVS        Post-Acute Status    Discharge Delays None known at this time                     Important Message from Medicare             Contact Info       Sloane Kimbrough FNP   Specialty: Family Medicine   Relationship: PCP - General    [address] [place] [address]  [address]  [facility] [address]   Phone: [phone]       Next Steps: Schedule an appointment as soon as possible for a visit in 2 day(s)    [facility] - Emergency Dept   Specialty: Emergency Medicine    [address] [place] [id number]   Phone: [phone]       Next Steps: Follow up    Instructions: If symptoms worsen    Klever Alvarado, PA-C   Specialty: Urgent Care    [address] [facility] [address]   Phone: [phone]       Next Steps: Follow up on 6/18/2024    Instructions: @ 10:45              UTI symptoms improving with current TX regimen.  Patient will transition to oral antibiotics.  Follow up appointments have been scheduled with PCP.  Discharge instructions and follow up care have been discussed with patient.  Patient VU of information provided.

## 2024-06-11 ENCOUNTER — PATIENT OUTREACH (OUTPATIENT)
Dept: ADMINISTRATIVE | Facility: CLINIC | Age: 29
End: 2024-06-11
Payer: COMMERCIAL

## 2024-06-11 ENCOUNTER — OFFICE VISIT (OUTPATIENT)
Dept: UROLOGY | Facility: CLINIC | Age: 29
End: 2024-06-11
Attending: SPECIALIST
Payer: COMMERCIAL

## 2024-06-11 VITALS — WEIGHT: 153.69 LBS | BODY MASS INDEX: 30.98 KG/M2 | HEIGHT: 59 IN

## 2024-06-11 DIAGNOSIS — N12 PYELONEPHRITIS: Primary | ICD-10-CM

## 2024-06-11 PROCEDURE — 3008F BODY MASS INDEX DOCD: CPT | Mod: CPTII,S$GLB,, | Performed by: SPECIALIST

## 2024-06-11 PROCEDURE — 1159F MED LIST DOCD IN RCRD: CPT | Mod: CPTII,S$GLB,, | Performed by: SPECIALIST

## 2024-06-11 PROCEDURE — 99999 PR PBB SHADOW E&M-EST. PATIENT-LVL II: CPT | Mod: PBBFAC,,, | Performed by: SPECIALIST

## 2024-06-11 PROCEDURE — 99204 OFFICE O/P NEW MOD 45 MIN: CPT | Mod: S$GLB,,, | Performed by: SPECIALIST

## 2024-06-11 NOTE — PROGRESS NOTES
[facility] - Urology   Clinic Note    SUBJECTIVE:     Chief Complaint   Patient presents with    Urinary Tract Infection     States she just got out of the hospital from a four day stay, is currently taking Macrobid.        Referral from: Self, Aaareferral.    History of Present Illness:  Zheng Beverly is a 28 y.o. female who presents to clinic for left flank pain secondary to Pyelonephritis.    Patient has a long hx of recurrent UTIs.  She mentions that she's placed on antibiotics (urgent care, PCP etc) roughly every other month.  Not surprisingly, she has developed antibiotic resistance.  She was admitted with pyelo and treated with meropenem (sens, see Ucx results below).  She is doing well at home.  She was given an Rx for 10 days of nitrofurantion ( I explained that although there is not good renal coverage with nitrofurantoin, I'd rather proceed with home oral antibiotics than outpatient or inpatient IV infusion since she is back to her usual state of health).     She mentions she saw a urologist in [place] a few years back.  Unclear if she was worked up for a neurogenic bladder.  She has a hx of spina bifida.  She mentions she does not empty her bladder completely. She emotionally cannot perform self-catheterizations.  Unclear if she had VUR as a child.  CT at [facility] with some atrophy of left kidney, no obstructing stones.    Patient endorses no additional complaints at this time.    History reviewed. No pertinent past medical history.    Past Surgical History:   Procedure Laterality Date     SECTION         No family history on file.    Social History     Tobacco Use    Smoking status: Never    Smokeless tobacco: Never       Current Outpatient Medications on File Prior to Visit   Medication Sig Dispense Refill    nitrofurantoin, macrocrystal-monohydrate, (MACROBID) 100 MG capsule Take 1 capsule (100 mg total) by mouth Daily. for 10 days 10 capsule 0     Current  "Facility-Administered Medications on File Prior to Visit   Medication Dose Route Frequency Provider Last Rate Last Admin    [DISCONTINUED] clonazePAM tablet 0.5 mg  0.5 mg Oral BID PRN Farhat Cannon MD   0.5 mg at 06/09/24 1249    [DISCONTINUED] ibuprofen tablet 400 mg  400 mg Oral Q6H PRN Farhat Cannon MD   400 mg at 06/10/24 0537    [DISCONTINUED] melatonin tablet 6 mg  6 mg Oral Nightly PRN Wesly Alcaraz MD   6 mg at 06/09/24 2051    [DISCONTINUED] meropenem (MERREM) 500 mg in sodium chloride 0.9 % 100 mL IVPB (MB+)  500 mg Intravenous Q8H Wesly Alcaraz MD   Stopped at 06/10/24 1405    [DISCONTINUED] ondansetron injection 8 mg  8 mg Intravenous Q8H PRN Farhat Cannon MD   8 mg at 06/08/24 2111    [DISCONTINUED] polyethylene glycol packet 17 g  17 g Oral Daily Farhat Cannon MD   17 g at 06/08/24 1354    [DISCONTINUED] sodium chloride 0.9% flush 10 mL  10 mL Intravenous PRN Wesly Alcaraz MD           Review of patient's allergies indicates:  No Known Allergies    Review of Systems   Constitutional:  Positive for malaise/fatigue. Negative for chills and fever.   Genitourinary:  Positive for dysuria and flank pain.        Review of Systems:  A review of 10+ systems was conducted with pertinent positive and negative findings documented in HPI with all other systems reviewed and negative.    OBJECTIVE:     Estimated body mass index is 31.04 kg/m² as calculated from the following:    Height as of this encounter: 4' 11" (1.499 m).    Weight as of this encounter: 69.7 kg (153 lb 10.6 oz).    Vital Signs (Most Recent)  There were no vitals filed for this visit.    Physical Exam:    Physical Exam     GENERAL: patient sitting comfortably  HEENT: normocephalic  NECK: supple, no JVD  PULM: normal chest rise, no increased WOB  HEART: non-diaphoretic  ABDO: soft, nondistended, nontender  BACK: no CVA tenderness bilaterally  SKIN: warm, dry, well perfused  EXT: no bruising or " edema  NEURO: grossly normal with no focal deficits  PSYCH: appropriate mood and affect    Genitourinary Exam:  deferred      LABS:     Lab Results   Component Value Date    BUN 9 06/10/2024    CREATININE 0.8 06/10/2024    WBC 4.54 06/09/2024    HGB 11.7 (L) 06/09/2024    HCT 36.6 (L) 06/09/2024     06/09/2024    AST 22 06/10/2024     (H) 06/10/2024    ALKPHOS 61 06/10/2024    ALBUMIN 3.6 06/10/2024    HGBA1C 5.2 03/04/2021        Urinalysis:   Urinalysis Reflex   Date Value Ref Range Status   05/05/2022 Comment  Final     Comment:     This specimen will not reflex to a Urine Culture.       [facility] Results Release    [facility] Status: Active  Results Release       Contains abnormal data Urine culture  Order: [id number]  Status: Final result       Visible to patient: Yes (seen)       Next appt: None    Specimen Information: Urine   0 Result Notes      Component 8 d ago   Urine Culture, Routine  Abnormal   ESCHERICHIA COLI ESBL  >100,000 cfu/ml    Resulting Agency [facility]        Susceptibility     Escherichia coli ESBL     CULTURE, URINE     Amikacin <=16 mcg/mL Sensitive     Amox/K Clav'ate <=8/4 mcg/mL Resistant     Amp/Sulbactam 16/8 mcg/mL Resistant     Ampicillin >16 mcg/mL Resistant     Cefazolin >16 mcg/mL Resistant     Cefepime >16 mcg/mL Resistant     Ceftriaxone >32 mcg/mL Resistant     Ciprofloxacin >2 mcg/mL Resistant     Ertapenem <=0.5 mcg/mL Sensitive     Gentamicin >8 mcg/mL Resistant     Levofloxacin >4 mcg/mL Resistant     Meropenem <=1 mcg/mL Sensitive     Nitrofurantoin <=32 mcg/mL Sensitive     Piperacillin/Tazo <=16 mcg/mL Resistant     Tobramycin <=4 mcg/mL Sensitive     Trimeth/Sulfa >2/38 mcg/mL Resistant                         Imaging:  I have personally reviewed all relevant imaging studies.    Results for orders placed or performed during the hospital encounter of 06/03/24 (from the past 2160 hour(s))   CT Renal Stone Study ABD Pelvis WO    Narrative    EXAMINATION:  CT RENAL  STONE STUDY ABD PELVIS WO    CLINICAL HISTORY:  Flank pain, kidney stone suspected;    TECHNIQUE:  Low dose axial images, sagittal and coronal reformations were obtained from the lung bases to the pubic symphysis.  Contrast was not administered.    COMPARISON:  02/25/2022, outside report from 03/06/2023    FINDINGS:  The lung bases are clear.  Base of the heart appears normal.  The aorta is of normal caliber and tapers appropriately.    The gallbladder is not visualized and presumably removed.  No intrahepatic or extrahepatic biliary ductal dilatation is identified.  The unenhanced liver has a normal appearance.  The spleen is mildly enlarged.  The pancreas, adrenal glands are unremarkable.  Lobulated contour of the atrophic left kidney.  No nephrolithiasis, ureterolithiasis, hydronephrosis or hydroureter is seen.  There is some equivocal stranding of the fat adjacent to the left kidney, difficult to fully evaluate given the lack of intravenous contrast material.  The urinary bladder is partially distended and appears normal.  Uterus and adnexa are unremarkable.    Constipation.  Appendix is normal.  No free air, free fluid or obstruction.  No pathologically enlarged abdominal or pelvic lymph nodes are seen.    Skeletal structures are intact.      Impression    There is a lobulated contour of the left kidney which is atrophic.  There does appear to be some subtle left-sided perinephric inflammation which could suggest a pyelonephritis and correlation with urinalysis is recommended.    No nephrolithiasis, ureterolithiasis, hydronephrosis or hydroureter.    Mild splenomegaly.    Constipation.      Electronically signed by: Irene Yang MD  Date:    06/03/2024  Time:    09:56     No results found for this or any previous visit (from the past 2160 hour(s)).  CT Renal Stone Study ABD Pelvis WO  Narrative: EXAMINATION:  CT RENAL STONE STUDY ABD PELVIS WO    CLINICAL HISTORY:  Flank pain, kidney stone  suspected;    TECHNIQUE:  Low dose axial images, sagittal and coronal reformations were obtained from the lung bases to the pubic symphysis.  Contrast was not administered.    COMPARISON:  02/25/2022, outside report from 03/06/2023    FINDINGS:  The lung bases are clear.  Base of the heart appears normal.  The aorta is of normal caliber and tapers appropriately.    The gallbladder is not visualized and presumably removed.  No intrahepatic or extrahepatic biliary ductal dilatation is identified.  The unenhanced liver has a normal appearance.  The spleen is mildly enlarged.  The pancreas, adrenal glands are unremarkable.  Lobulated contour of the atrophic left kidney.  No nephrolithiasis, ureterolithiasis, hydronephrosis or hydroureter is seen.  There is some equivocal stranding of the fat adjacent to the left kidney, difficult to fully evaluate given the lack of intravenous contrast material.  The urinary bladder is partially distended and appears normal.  Uterus and adnexa are unremarkable.    Constipation.  Appendix is normal.  No free air, free fluid or obstruction.  No pathologically enlarged abdominal or pelvic lymph nodes are seen.    Skeletal structures are intact.  Impression: There is a lobulated contour of the left kidney which is atrophic.  There does appear to be some subtle left-sided perinephric inflammation which could suggest a pyelonephritis and correlation with urinalysis is recommended.    No nephrolithiasis, ureterolithiasis, hydronephrosis or hydroureter.    Mild splenomegaly.    Constipation.    Electronically signed by: Irene Yang MD  Date:    06/03/2024  Time:    09:56         ASSESSMENT     1. Pyelonephritis        PLAN:     Poor renal coverage with nitrofurantoin, however, as mentioned above, will proceed as she's doing well.  Long term, she needs a work, up and I would start with a uroflow/bladder scan and voiding diary.  Will consider referral for urodynamics.  Ideally, I would like to  follow her UA's closely rather than her going to an urgent care center and repeatedly being placed on empiric courses of antibiotics and further complicating antibiotic resistance.  RTC two weeks.    Dewayne Belcher MD  Urology  [facility]     Disclaimer: This note has been generated using voice-recognition software. There may be typographical errors that have been missed during proof-reading.

## 2024-06-11 NOTE — PROGRESS NOTES
C3 nurse attempted to contact Zheng Beverly  for a TCC post hospital discharge follow up call. No answer. The patient does not have a scheduled HOSFU appointment, and the pt does not have an [facility] PCP.

## 2024-06-12 ENCOUNTER — PATIENT MESSAGE (OUTPATIENT)
Dept: ADMINISTRATIVE | Facility: CLINIC | Age: 29
End: 2024-06-12
Payer: COMMERCIAL

## 2024-06-12 NOTE — PROGRESS NOTES
2nd Attempt made to reach patient for TCC call. Left voicemail please call [phone] leave first name, last name, and  for Yajaira I will return your call.    Message sent via [facility] portal for Post Discharge Attempt.

## 2024-06-13 LAB
BACTERIA BLD CULT: NORMAL
BACTERIA BLD CULT: NORMAL

## 2024-06-13 NOTE — PROGRESS NOTES
4th Attempt made to reach patient for TCC call. Left voicemail please call [phone] leave first name, last name, and  for Yajaira I will return your call.

## 2024-07-11 ENCOUNTER — OFFICE VISIT (OUTPATIENT)
Dept: UROLOGY | Facility: CLINIC | Age: 29
End: 2024-07-11
Attending: SPECIALIST
Payer: MEDICAID

## 2024-07-11 ENCOUNTER — PATIENT MESSAGE (OUTPATIENT)
Dept: UROLOGY | Facility: CLINIC | Age: 29
End: 2024-07-11

## 2024-07-11 VITALS
BODY MASS INDEX: 31.89 KG/M2 | WEIGHT: 158.19 LBS | DIASTOLIC BLOOD PRESSURE: 76 MMHG | SYSTOLIC BLOOD PRESSURE: 116 MMHG | HEIGHT: 59 IN

## 2024-07-11 DIAGNOSIS — N31.9 NEUROGENIC BLADDER: ICD-10-CM

## 2024-07-11 DIAGNOSIS — N12 PYELONEPHRITIS: Primary | ICD-10-CM

## 2024-07-11 PROCEDURE — 3074F SYST BP LT 130 MM HG: CPT | Mod: CPTII,,, | Performed by: SPECIALIST

## 2024-07-11 PROCEDURE — 99999 PR PBB SHADOW E&M-EST. PATIENT-LVL III: CPT | Mod: PBBFAC,,, | Performed by: SPECIALIST

## 2024-07-11 PROCEDURE — 99214 OFFICE O/P EST MOD 30 MIN: CPT | Mod: S$PBB,,, | Performed by: SPECIALIST

## 2024-07-11 PROCEDURE — 3078F DIAST BP <80 MM HG: CPT | Mod: CPTII,,, | Performed by: SPECIALIST

## 2024-07-11 PROCEDURE — 99213 OFFICE O/P EST LOW 20 MIN: CPT | Mod: PBBFAC | Performed by: SPECIALIST

## 2024-07-11 PROCEDURE — 3008F BODY MASS INDEX DOCD: CPT | Mod: CPTII,,, | Performed by: SPECIALIST

## 2024-07-11 PROCEDURE — 99999PBSHW PR PBB SHADOW TECHNICAL ONLY FILED TO HB: Mod: PBBFAC,,,

## 2024-07-11 PROCEDURE — 51798 US URINE CAPACITY MEASURE: CPT | Mod: PBBFAC | Performed by: SPECIALIST

## 2024-07-11 NOTE — PROGRESS NOTES
"[facility] - Urology   Clinic Note    SUBJECTIVE:     Chief Complaint   Patient presents with    Follow-up     States she's coming in for a bladder scan.        Referral from: No ref. provider found.    History of Present Illness:  Zheng Beverly is a 28 y.o. female who presents to clinic for UTI/Pyelo.    Patient returns for follow up.  Her flank pain has resolved.  Repeat Urine dip pending.  Unclear whether she has a neurogenic bladder with her hx of spina bifid, recommend a bladder scan to check PVR (see below).    History reviewed. No pertinent past medical history.    No current outpatient medications on file prior to visit.     No current facility-administered medications on file prior to visit.         OBJECTIVE:     Estimated body mass index is 31.95 kg/m² as calculated from the following:    Height as of this encounter: 4' 11" (1.499 m).    Weight as of this encounter: 71.7 kg (158 lb 2.9 oz).    Vital Signs (Most Recent)  Vitals:    07/11/24 1057   BP: 116/76       Physical Exam:    Physical Exam     GENERAL: patient sitting comfortably  HEENT: normocephalic  NECK: supple, no JVD  PULM: normal chest rise, no increased WOB  HEART: non-diaphoretic  ABDO: soft, nondistended, nontender  BACK: no CVA tenderness bilaterally  SKIN: warm, dry, well perfused  EXT: no bruising or edema  NEURO: grossly normal with no focal deficits  PSYCH: appropriate mood and affect    Genitourinary Exam:  deferred      LABS:     Lab Results   Component Value Date    BUN 9 06/10/2024    CREATININE 0.8 06/10/2024    WBC 4.54 06/09/2024    HGB 11.7 (L) 06/09/2024    HCT 36.6 (L) 06/09/2024     06/09/2024    AST 22 06/10/2024     (H) 06/10/2024    ALKPHOS 61 06/10/2024    ALBUMIN 3.6 06/10/2024    HGBA1C 5.2 03/04/2021        Urinalysis:   Urinalysis Reflex   Date Value Ref Range Status   05/05/2022 Comment  Final     Comment:     This specimen will not reflex to a Urine Culture.          Imaging:  I have " personally reviewed all relevant imaging studies.    Results for orders placed or performed during the hospital encounter of 06/03/24 (from the past 2160 hour(s))   CT Renal Stone Study ABD Pelvis WO    Narrative    EXAMINATION:  CT RENAL STONE STUDY ABD PELVIS WO    CLINICAL HISTORY:  Flank pain, kidney stone suspected;    TECHNIQUE:  Low dose axial images, sagittal and coronal reformations were obtained from the lung bases to the pubic symphysis.  Contrast was not administered.    COMPARISON:  02/25/2022, outside report from 03/06/2023    FINDINGS:  The lung bases are clear.  Base of the heart appears normal.  The aorta is of normal caliber and tapers appropriately.    The gallbladder is not visualized and presumably removed.  No intrahepatic or extrahepatic biliary ductal dilatation is identified.  The unenhanced liver has a normal appearance.  The spleen is mildly enlarged.  The pancreas, adrenal glands are unremarkable.  Lobulated contour of the atrophic left kidney.  No nephrolithiasis, ureterolithiasis, hydronephrosis or hydroureter is seen.  There is some equivocal stranding of the fat adjacent to the left kidney, difficult to fully evaluate given the lack of intravenous contrast material.  The urinary bladder is partially distended and appears normal.  Uterus and adnexa are unremarkable.    Constipation.  Appendix is normal.  No free air, free fluid or obstruction.  No pathologically enlarged abdominal or pelvic lymph nodes are seen.    Skeletal structures are intact.      Impression    There is a lobulated contour of the left kidney which is atrophic.  There does appear to be some subtle left-sided perinephric inflammation which could suggest a pyelonephritis and correlation with urinalysis is recommended.    No nephrolithiasis, ureterolithiasis, hydronephrosis or hydroureter.    Mild splenomegaly.    Constipation.      Electronically signed by: Ierne Yang MD  Date:    06/03/2024  Time:    09:56     No  results found for this or any previous visit (from the past 2160 hour(s)).  CT Renal Stone Study ABD Pelvis WO  Narrative: EXAMINATION:  CT RENAL STONE STUDY ABD PELVIS WO    CLINICAL HISTORY:  Flank pain, kidney stone suspected;    TECHNIQUE:  Low dose axial images, sagittal and coronal reformations were obtained from the lung bases to the pubic symphysis.  Contrast was not administered.    COMPARISON:  02/25/2022, outside report from 03/06/2023    FINDINGS:  The lung bases are clear.  Base of the heart appears normal.  The aorta is of normal caliber and tapers appropriately.    The gallbladder is not visualized and presumably removed.  No intrahepatic or extrahepatic biliary ductal dilatation is identified.  The unenhanced liver has a normal appearance.  The spleen is mildly enlarged.  The pancreas, adrenal glands are unremarkable.  Lobulated contour of the atrophic left kidney.  No nephrolithiasis, ureterolithiasis, hydronephrosis or hydroureter is seen.  There is some equivocal stranding of the fat adjacent to the left kidney, difficult to fully evaluate given the lack of intravenous contrast material.  The urinary bladder is partially distended and appears normal.  Uterus and adnexa are unremarkable.    Constipation.  Appendix is normal.  No free air, free fluid or obstruction.  No pathologically enlarged abdominal or pelvic lymph nodes are seen.    Skeletal structures are intact.  Impression: There is a lobulated contour of the left kidney which is atrophic.  There does appear to be some subtle left-sided perinephric inflammation which could suggest a pyelonephritis and correlation with urinalysis is recommended.    No nephrolithiasis, ureterolithiasis, hydronephrosis or hydroureter.    Mild splenomegaly.    Constipation.    Electronically signed by: Irene Yang MD  Date:    06/03/2024  Time:    09:56     Bladder Scan:  Approx 30 cc PVR    ASSESSMENT     1. Neurogenic bladder      Pyelo resolved  PLAN:      Encouraged hydration, cranberry juice.  Call office and forward UA if symptoms reoccur.  RTC six mo.    Dewayne Belcher MD  Urology  [facility]     Disclaimer: This note has been generated using voice-recognition software. There may be typographical errors that have been missed during proof-reading.

## 2024-08-15 ENCOUNTER — OFFICE VISIT (OUTPATIENT)
Dept: UROLOGY | Facility: CLINIC | Age: 29
End: 2024-08-15
Attending: SPECIALIST
Payer: MEDICAID

## 2024-08-15 ENCOUNTER — TELEPHONE (OUTPATIENT)
Dept: UROLOGY | Facility: CLINIC | Age: 29
End: 2024-08-15
Payer: MEDICAID

## 2024-08-15 DIAGNOSIS — B96.89 ACUTE BACTERIAL SIMPLE CYSTITIS: Primary | ICD-10-CM

## 2024-08-15 DIAGNOSIS — N30.80 ACUTE BACTERIAL SIMPLE CYSTITIS: Primary | ICD-10-CM

## 2024-08-15 DIAGNOSIS — N31.9 NEUROGENIC BLADDER: ICD-10-CM

## 2024-08-15 LAB
BILIRUB SERPL-MCNC: NEGATIVE MG/DL
BLOOD URINE, POC: 50
CLARITY, POC UA: NORMAL
COLOR, POC UA: NORMAL
GLUCOSE UR QL STRIP: NORMAL
KETONES UR QL STRIP: NEGATIVE
LEUKOCYTE ESTERASE URINE, POC: NEGATIVE
NITRITE, POC UA: NEGATIVE
PH, POC UA: 6
PROTEIN, POC: NORMAL
SPECIFIC GRAVITY, POC UA: 1.01
UROBILINOGEN, POC UA: NORMAL

## 2024-08-15 PROCEDURE — 99212 OFFICE O/P EST SF 10 MIN: CPT | Mod: PBBFAC | Performed by: SPECIALIST

## 2024-08-15 PROCEDURE — 99999PBSHW POCT URINE DIPSTICK WITHOUT MICROSCOPE: Mod: PBBFAC,,,

## 2024-08-15 PROCEDURE — 81002 URINALYSIS NONAUTO W/O SCOPE: CPT | Mod: PBBFAC | Performed by: SPECIALIST

## 2024-08-15 PROCEDURE — 99999 PR PBB SHADOW E&M-EST. PATIENT-LVL II: CPT | Mod: PBBFAC,,, | Performed by: SPECIALIST

## 2024-08-15 PROCEDURE — 1159F MED LIST DOCD IN RCRD: CPT | Mod: CPTII,,, | Performed by: SPECIALIST

## 2024-08-15 PROCEDURE — 99213 OFFICE O/P EST LOW 20 MIN: CPT | Mod: S$PBB,,, | Performed by: SPECIALIST

## 2024-08-15 PROCEDURE — 1160F RVW MEDS BY RX/DR IN RCRD: CPT | Mod: CPTII,,, | Performed by: SPECIALIST

## 2024-08-15 NOTE — PROGRESS NOTES
"[facility] - Urology   Clinic Note    SUBJECTIVE:     Chief Complaint   Patient presents with    Urinary Tract Infection     States she first noticed the symptoms a few days ago, states she is having a lot of back pain. US of the kidney is scheduled tomorrow.        Referral from: No ref. provider found.    History of Present Illness:  Zheng Beverly is a 28 y.o. female who presents to clinic for possible UTI.    Patient called, thought she may have a UTI, she was asked to provide a UA.  She c/o mild lower back pain.  Denies n/v, f/c, or hematuria.      Urine dip in office unremarkable.    History reviewed. No pertinent past medical history.    No current outpatient medications on file prior to visit.     No current facility-administered medications on file prior to visit.         OBJECTIVE:     Estimated body mass index is 31.95 kg/m² as calculated from the following:    Height as of 7/11/24: 4' 11" (1.499 m).    Weight as of 7/11/24: 71.7 kg (158 lb 2.9 oz).    Vital Signs (Most Recent)  There were no vitals filed for this visit.    Physical Exam:    Physical Exam     GENERAL: patient sitting comfortably  HEENT: normocephalic  NECK: supple, no JVD  PULM: normal chest rise, no increased WOB  HEART: non-diaphoretic  ABDO: soft, nondistended, nontender  BACK: no CVA tenderness bilaterally  SKIN: warm, dry, well perfused  EXT: no bruising or edema  NEURO: grossly normal with no focal deficits  PSYCH: appropriate mood and affect    Genitourinary Exam:  deferred      LABS:     Lab Results   Component Value Date    BUN 9 06/10/2024    CREATININE 0.8 06/10/2024    WBC 4.54 06/09/2024    HGB 11.7 (L) 06/09/2024    HCT 36.6 (L) 06/09/2024     06/09/2024    AST 22 06/10/2024     (H) 06/10/2024    ALKPHOS 61 06/10/2024    ALBUMIN 3.6 06/10/2024    HGBA1C 5.2 03/04/2021        Urinalysis:   Urinalysis Reflex   Date Value Ref Range Status   05/05/2022 Comment  Final     Comment:     This specimen will " not reflex to a Urine Culture.          Imaging:  I have personally reviewed all relevant imaging studies.    Results for orders placed or performed during the hospital encounter of 06/03/24 (from the past 2160 hour(s))   CT Renal Stone Study ABD Pelvis WO    Narrative    EXAMINATION:  CT RENAL STONE STUDY ABD PELVIS WO    CLINICAL HISTORY:  Flank pain, kidney stone suspected;    TECHNIQUE:  Low dose axial images, sagittal and coronal reformations were obtained from the lung bases to the pubic symphysis.  Contrast was not administered.    COMPARISON:  02/25/2022, outside report from 03/06/2023    FINDINGS:  The lung bases are clear.  Base of the heart appears normal.  The aorta is of normal caliber and tapers appropriately.    The gallbladder is not visualized and presumably removed.  No intrahepatic or extrahepatic biliary ductal dilatation is identified.  The unenhanced liver has a normal appearance.  The spleen is mildly enlarged.  The pancreas, adrenal glands are unremarkable.  Lobulated contour of the atrophic left kidney.  No nephrolithiasis, ureterolithiasis, hydronephrosis or hydroureter is seen.  There is some equivocal stranding of the fat adjacent to the left kidney, difficult to fully evaluate given the lack of intravenous contrast material.  The urinary bladder is partially distended and appears normal.  Uterus and adnexa are unremarkable.    Constipation.  Appendix is normal.  No free air, free fluid or obstruction.  No pathologically enlarged abdominal or pelvic lymph nodes are seen.    Skeletal structures are intact.      Impression    There is a lobulated contour of the left kidney which is atrophic.  There does appear to be some subtle left-sided perinephric inflammation which could suggest a pyelonephritis and correlation with urinalysis is recommended.    No nephrolithiasis, ureterolithiasis, hydronephrosis or hydroureter.    Mild splenomegaly.    Constipation.      Electronically signed  by: Irene Yang MD  Date:    06/03/2024  Time:    09:56     No results found for this or any previous visit (from the past 2160 hour(s)).  CT Renal Stone Study ABD Pelvis WO  Narrative: EXAMINATION:  CT RENAL STONE STUDY ABD PELVIS WO    CLINICAL HISTORY:  Flank pain, kidney stone suspected;    TECHNIQUE:  Low dose axial images, sagittal and coronal reformations were obtained from the lung bases to the pubic symphysis.  Contrast was not administered.    COMPARISON:  02/25/2022, outside report from 03/06/2023    FINDINGS:  The lung bases are clear.  Base of the heart appears normal.  The aorta is of normal caliber and tapers appropriately.    The gallbladder is not visualized and presumably removed.  No intrahepatic or extrahepatic biliary ductal dilatation is identified.  The unenhanced liver has a normal appearance.  The spleen is mildly enlarged.  The pancreas, adrenal glands are unremarkable.  Lobulated contour of the atrophic left kidney.  No nephrolithiasis, ureterolithiasis, hydronephrosis or hydroureter is seen.  There is some equivocal stranding of the fat adjacent to the left kidney, difficult to fully evaluate given the lack of intravenous contrast material.  The urinary bladder is partially distended and appears normal.  Uterus and adnexa are unremarkable.    Constipation.  Appendix is normal.  No free air, free fluid or obstruction.  No pathologically enlarged abdominal or pelvic lymph nodes are seen.    Skeletal structures are intact.  Impression: There is a lobulated contour of the left kidney which is atrophic.  There does appear to be some subtle left-sided perinephric inflammation which could suggest a pyelonephritis and correlation with urinalysis is recommended.    No nephrolithiasis, ureterolithiasis, hydronephrosis or hydroureter.    Mild splenomegaly.    Constipation.    Electronically signed by: Irene Yang MD  Date:    06/03/2024  Time:    09:56         ASSESSMENT     1. Acute bacterial  simple cystitis    2. Neurogenic bladder        PLAN:     Increase hydration.  Azo PRN.  RTC if sx progress.    Dewayne Belcher MD  Urology  [facility]     Disclaimer: This note has been generated using voice-recognition software. There may be typographical errors that have been missed during proof-reading.

## 2024-08-15 NOTE — TELEPHONE ENCOUNTER
----- Message from Peggy Jordan sent at 8/15/2024  8:04 AM CDT -----  Contact: Patient  Zheng Beverly  MRN: [id number]  : 1995  PCP: Sloane Kimbrough  Home Phone      [phone]  Work Phone      [phone]  Mobile          [phone]      MESSAGE: Patient thinks she has a UTI or a kidney infection and would like a returned call to discuss what she should do. She says at her last appointment she was told to only take antibiotics but she doesn't have any. Please return this call    PHONE; [phone]

## 2024-08-16 ENCOUNTER — HOSPITAL ENCOUNTER (OUTPATIENT)
Dept: RADIOLOGY | Facility: HOSPITAL | Age: 29
Discharge: HOME | End: 2024-08-16
Attending: SPECIALIST
Payer: MEDICAID

## 2024-08-16 DIAGNOSIS — N31.9 NEUROGENIC BLADDER: ICD-10-CM

## 2024-08-16 PROCEDURE — 76770 US EXAM ABDO BACK WALL COMP: CPT | Mod: 26,,, | Performed by: RADIOLOGY

## 2024-08-16 PROCEDURE — 76770 US EXAM ABDO BACK WALL COMP: CPT | Mod: TC

## 2024-08-22 ENCOUNTER — OFFICE VISIT (OUTPATIENT)
Dept: UROLOGY | Facility: CLINIC | Age: 29
End: 2024-08-22
Attending: SPECIALIST
Payer: MEDICAID

## 2024-08-22 DIAGNOSIS — N30.80 ACUTE BACTERIAL SIMPLE CYSTITIS: Primary | ICD-10-CM

## 2024-08-22 DIAGNOSIS — B96.89 ACUTE BACTERIAL SIMPLE CYSTITIS: Primary | ICD-10-CM

## 2024-08-22 PROCEDURE — 99999 PR PBB SHADOW E&M-EST. PATIENT-LVL I: CPT | Mod: PBBFAC,,, | Performed by: SPECIALIST

## 2024-08-22 PROCEDURE — 99211 OFF/OP EST MAY X REQ PHY/QHP: CPT | Mod: PBBFAC | Performed by: SPECIALIST

## 2024-08-22 PROCEDURE — 99214 OFFICE O/P EST MOD 30 MIN: CPT | Mod: S$PBB,,, | Performed by: SPECIALIST

## 2024-08-22 NOTE — PROGRESS NOTES
"[facility] - Urology   Clinic Note    SUBJECTIVE:     Chief Complaint   Patient presents with    Results     Ultrasound       Referral from: No ref. provider found.    History of Present Illness:  Zheng Beverly is a 28 y.o. female who presents to clinic for cystitis.    Hx of reflux as child.  PMHx significant for spina bifida, likely some neurogenic component.  Fortunately her lower back pain and dysuria have resolved.  Renal US without hydronephrosis or stones, atrophic right kidney    History reviewed. No pertinent past medical history.    No current outpatient medications on file prior to visit.     No current facility-administered medications on file prior to visit.     BUN/Cr WNL    OBJECTIVE:     Estimated body mass index is 31.95 kg/m² as calculated from the following:    Height as of 7/11/24: 4' 11" (1.499 m).    Weight as of 7/11/24: 71.7 kg (158 lb 2.9 oz).    Vital Signs (Most Recent)  There were no vitals filed for this visit.    Physical Exam:    Physical Exam     GENERAL: patient sitting comfortably  HEENT: normocephalic  NECK: supple, no JVD  PULM: normal chest rise, no increased WOB  HEART: non-diaphoretic  ABDO: soft, nondistended, nontender  BACK: no CVA tenderness bilaterally  SKIN: warm, dry, well perfused  EXT: no bruising or edema  NEURO: grossly normal with no focal deficits  PSYCH: appropriate mood and affect    Genitourinary Exam:  deferred      LABS:     Lab Results   Component Value Date    BUN 9 06/10/2024    CREATININE 0.8 06/10/2024    WBC 4.54 06/09/2024    HGB 11.7 (L) 06/09/2024    HCT 36.6 (L) 06/09/2024     06/09/2024    AST 22 06/10/2024     (H) 06/10/2024    ALKPHOS 61 06/10/2024    ALBUMIN 3.6 06/10/2024    HGBA1C 5.2 03/04/2021        Urinalysis:   Urinalysis Reflex   Date Value Ref Range Status   05/05/2022 Comment  Final     Comment:     This specimen will not reflex to a Urine Culture.          Imaging:  I have personally reviewed all relevant " imaging studies.    Results for orders placed or performed during the hospital encounter of 06/03/24 (from the past 2160 hour(s))   CT Renal Stone Study ABD Pelvis WO    Narrative    EXAMINATION:  CT RENAL STONE STUDY ABD PELVIS WO    CLINICAL HISTORY:  Flank pain, kidney stone suspected;    TECHNIQUE:  Low dose axial images, sagittal and coronal reformations were obtained from the lung bases to the pubic symphysis.  Contrast was not administered.    COMPARISON:  02/25/2022, outside report from 03/06/2023    FINDINGS:  The lung bases are clear.  Base of the heart appears normal.  The aorta is of normal caliber and tapers appropriately.    The gallbladder is not visualized and presumably removed.  No intrahepatic or extrahepatic biliary ductal dilatation is identified.  The unenhanced liver has a normal appearance.  The spleen is mildly enlarged.  The pancreas, adrenal glands are unremarkable.  Lobulated contour of the atrophic left kidney.  No nephrolithiasis, ureterolithiasis, hydronephrosis or hydroureter is seen.  There is some equivocal stranding of the fat adjacent to the left kidney, difficult to fully evaluate given the lack of intravenous contrast material.  The urinary bladder is partially distended and appears normal.  Uterus and adnexa are unremarkable.    Constipation.  Appendix is normal.  No free air, free fluid or obstruction.  No pathologically enlarged abdominal or pelvic lymph nodes are seen.    Skeletal structures are intact.      Impression    There is a lobulated contour of the left kidney which is atrophic.  There does appear to be some subtle left-sided perinephric inflammation which could suggest a pyelonephritis and correlation with urinalysis is recommended.    No nephrolithiasis, ureterolithiasis, hydronephrosis or hydroureter.    Mild splenomegaly.    Constipation.      Electronically signed by: Irene Yang MD  Date:    06/03/2024  Time:    09:56     No results found for this or any  previous visit (from the past 2160 hour(s)).  US Retroperitoneal Complete  Narrative: EXAMINATION:  US RETROPERITONEAL COMPLETE    CLINICAL HISTORY:  Neuromuscular dysfunction of bladder, unspecified    TECHNIQUE:  Ultrasound of the kidneys and urinary bladder was performed including color flow and Doppler evaluation of the kidneys.    COMPARISON:  06/03/2024    FINDINGS:  Right kidney: The right kidney measures 12.3 cm. No cortical thinning. No loss of corticomedullary distinction. Resistive index measures 0.69.  No mass. No renal stone. No hydronephrosis.    Left kidney: The left kidney measures 8.3 cm. There is cortical thinning. No loss of corticomedullary distinction. Resistive index measures 0.61.  No mass. No renal stone. No hydronephrosis.    The bladder is partially distended at the time of scanning and has an unremarkable appearance.  Impression: No obstructive uropathy.  Atrophied left kidney.    Electronically signed by: Juan Pablo Reece  Date:    08/16/2024  Time:    11:33         ASSESSMENT     1. Acute bacterial simple cystitis        PLAN:     Asymptomatic.  Doing well.  RTC in one year    Dewayne Belcher MD  Urology  [facility]     Disclaimer: This note has been generated using voice-recognition software. There may be typographical errors that have been missed during proof-reading.

## 2024-08-23 ENCOUNTER — TELEPHONE (OUTPATIENT)
Dept: UROLOGY | Facility: CLINIC | Age: 29
End: 2024-08-23
Payer: MEDICAID

## 2024-08-23 NOTE — TELEPHONE ENCOUNTER
----- Message from Dewayne Belcher MD sent at 2024  5:24 PM CDT -----  Contact: PATIENT    Impression:     No obstructive uropathy.  Atrophied left kidney.        Electronically signed by:Juan Pablo Reece  Date:                                            2024  Time:                                           11:33  ----- Message -----  From: Yumiko Hui MA  Sent: 2024   4:05 PM CDT  To: Dewayne Belcher MD    Please advise.  ----- Message -----  From: Michelle Pretty RN  Sent: 2024   4:03 PM CDT  To: [facility] Clinical Staff      ----- Message -----  From: Juliann Cam  Sent: 2024   2:45 PM CDT  To: [facility] Staff    Zheng Beverly  MRN: [id number]  : 1995  PCP: Sloane Kimbrough  Home Phone      [phone]  Work Phone      [phone]  Mobile          [phone]      MESSAGE: Patient is calling for her ultrasound results.          Phone: [phone]

## 2024-09-09 PROBLEM — N39.0 E-COLI UTI: Status: RESOLVED | Noted: 2024-06-10 | Resolved: 2024-09-09

## 2024-09-09 PROBLEM — N12 PYELONEPHRITIS: Status: RESOLVED | Noted: 2024-06-07 | Resolved: 2024-09-09

## 2024-09-09 PROBLEM — B96.20 E-COLI UTI: Status: RESOLVED | Noted: 2024-06-10 | Resolved: 2024-09-09

## 2024-10-22 ENCOUNTER — OFFICE VISIT (OUTPATIENT)
Dept: UROLOGY | Facility: CLINIC | Age: 29
End: 2024-10-22
Attending: SPECIALIST
Payer: MEDICAID

## 2024-10-22 VITALS
OXYGEN SATURATION: 99 % | BODY MASS INDEX: 33.89 KG/M2 | DIASTOLIC BLOOD PRESSURE: 74 MMHG | HEIGHT: 59 IN | WEIGHT: 168.13 LBS | SYSTOLIC BLOOD PRESSURE: 102 MMHG | HEART RATE: 75 BPM

## 2024-10-22 DIAGNOSIS — N30.80 ACUTE BACTERIAL SIMPLE CYSTITIS: ICD-10-CM

## 2024-10-22 DIAGNOSIS — N34.2 INFECTIVE URETHRITIS: Primary | ICD-10-CM

## 2024-10-22 DIAGNOSIS — B96.89 ACUTE BACTERIAL SIMPLE CYSTITIS: ICD-10-CM

## 2024-10-22 PROCEDURE — 99214 OFFICE O/P EST MOD 30 MIN: CPT | Mod: S$PBB,,, | Performed by: SPECIALIST

## 2024-10-22 PROCEDURE — 99999 PR PBB SHADOW E&M-EST. PATIENT-LVL III: CPT | Mod: PBBFAC,,, | Performed by: SPECIALIST

## 2024-10-22 PROCEDURE — 3074F SYST BP LT 130 MM HG: CPT | Mod: CPTII,,, | Performed by: SPECIALIST

## 2024-10-22 PROCEDURE — 99213 OFFICE O/P EST LOW 20 MIN: CPT | Mod: PBBFAC | Performed by: SPECIALIST

## 2024-10-22 PROCEDURE — 3078F DIAST BP <80 MM HG: CPT | Mod: CPTII,,, | Performed by: SPECIALIST

## 2024-10-22 PROCEDURE — 1159F MED LIST DOCD IN RCRD: CPT | Mod: CPTII,,, | Performed by: SPECIALIST

## 2024-10-22 PROCEDURE — 3008F BODY MASS INDEX DOCD: CPT | Mod: CPTII,,, | Performed by: SPECIALIST

## 2024-10-22 RX ORDER — SULFAMETHOXAZOLE AND TRIMETHOPRIM 800; 160 MG/1; MG/1
TABLET ORAL
COMMUNITY
Start: 2024-10-18

## 2024-10-22 NOTE — PROGRESS NOTES
"[facility] - Urology   Clinic Note    SUBJECTIVE:     Chief Complaint   Patient presents with    Follow-up     States she went to her pcp at [facility] because she was having UTI symptoms, states she was placed on antibiotics but hasn't received a call with her results yet. No uti symptoms present at the moment, only some back pain.        Referral from: No ref. provider found.    History of Present Illness:  Zheng Beverly is a 29 y.o. female who presents to clinic for acute cystitis.    Patient [facility].  She had progressive lower back pain and was afraid she was developing pyelonephritis.  On the other hand, she states that her urinalysis performed last week was negative.  It is also unclear whether a culture has been sent.  She looked for the results on her phone and they are presently unavailable.  I do not see any lab results on [facility].  Her back pain has since resolved.  She denies hematuria dysuria nausea vomiting fever or chills.    Current Outpatient Medications on File Prior to Visit   Medication Sig Dispense Refill    BACTRIM -160 mg Tab Take 1 tablet every 12 hours by oral route.       No current facility-administered medications on file prior to visit.         OBJECTIVE:     Estimated body mass index is 33.95 kg/m² as calculated from the following:    Height as of this encounter: 4' 11" (1.499 m).    Weight as of this encounter: 76.2 kg (168 lb 1.6 oz).    Vital Signs (Most Recent)  Vitals:    10/22/24 0902   BP: 102/74   Pulse: 75       Physical Exam:    Physical Exam     GENERAL: patient sitting comfortably  HEENT: normocephalic  NECK: supple, no JVD  PULM: normal chest rise, no increased WOB  HEART: non-diaphoretic  ABDO: soft, nondistended, nontender  BACK: no CVA tenderness bilaterally  SKIN: warm, dry, well perfused  EXT: no bruising or edema  NEURO: grossly normal with no focal deficits  PSYCH: appropriate mood and " affect    Genitourinary Exam:  deferred      LABS:     Lab Results   Component Value Date    BUN 9 06/10/2024    CREATININE 0.8 06/10/2024    WBC 4.54 06/09/2024    HGB 11.7 (L) 06/09/2024    HCT 36.6 (L) 06/09/2024     06/09/2024    AST 22 06/10/2024     (H) 06/10/2024    ALKPHOS 61 06/10/2024    ALBUMIN 3.6 06/10/2024    HGBA1C 5.2 03/04/2021        Urinalysis:   Urinalysis Reflex   Date Value Ref Range Status   05/05/2022 Comment  Final     Comment:     This specimen will not reflex to a Urine Culture.            Component 4 mo ago   Urine Culture, Routine  Abnormal   ESCHERICHIA COLI ESBL  >100,000 cfu/ml    Resulting Agency [facility]        Susceptibility     Escherichia coli ESBL     CULTURE, URINE     Amikacin <=16 mcg/mL Sensitive     Amox/K Clav'ate <=8/4 mcg/mL Resistant     Amp/Sulbactam 16/8 mcg/mL Resistant     Ampicillin >16 mcg/mL Resistant     Cefazolin >16 mcg/mL Resistant     Cefepime >16 mcg/mL Resistant     Ceftriaxone >32 mcg/mL Resistant     Ciprofloxacin >2 mcg/mL Resistant     Ertapenem <=0.5 mcg/mL Sensitive     Gentamicin >8 mcg/mL Resistant     Levofloxacin >4 mcg/mL Resistant     Meropenem <=1 mcg/mL Sensitive     Nitrofurantoin <=32 mcg/mL Sensitive     Piperacillin/Tazo <=16 mcg/mL Resistant     Tobramycin <=4 mcg/mL Sensitive     Trimeth/Sulfa >2/38 mcg/mL Resistant                   Imaging:  I have personally reviewed all relevant imaging studies.    No results found for this or any previous visit (from the past 2160 hours).  No results found for this or any previous visit (from the past 2160 hours).  US Retroperitoneal Complete  Narrative: EXAMINATION:  US RETROPERITONEAL COMPLETE    CLINICAL HISTORY:  Neuromuscular dysfunction of bladder, unspecified    TECHNIQUE:  Ultrasound of the kidneys and urinary bladder was performed including color flow and Doppler evaluation of the kidneys.    COMPARISON:  06/03/2024    FINDINGS:  Right kidney: The right kidney measures 12.3 cm.  No cortical thinning. No loss of corticomedullary distinction. Resistive index measures 0.69.  No mass. No renal stone. No hydronephrosis.    Left kidney: The left kidney measures 8.3 cm. There is cortical thinning. No loss of corticomedullary distinction. Resistive index measures 0.61.  No mass. No renal stone. No hydronephrosis.    The bladder is partially distended at the time of scanning and has an unremarkable appearance.  Impression: No obstructive uropathy.  Atrophied left kidney.    Electronically signed by: Juan Pablo Reece  Date:    08/16/2024  Time:    11:33         ASSESSMENT     1. Infective urethritis    2. Acute bacterial simple cystitis        PLAN:     Unclear whether she has acute bacterial cystitis versus interstitial cystitis.  I have encouraged her to forward a copy of her urinalysis and culture [facility].  I have encouraged her to complete one-week course of Bactrim DS.  In the future, I have encouraged her to forward a UA and urine culture to [facility], a standing order was placed in the EMR.  Follow up in one-week.    Dewayne Belcher MD  Urology  [facility]     Disclaimer: This note has been generated using voice-recognition software. There may be typographical errors that have been missed during proof-reading.

## 2024-11-27 ENCOUNTER — LAB VISIT (OUTPATIENT)
Dept: LAB | Facility: HOSPITAL | Age: 29
End: 2024-11-27
Attending: STUDENT IN AN ORGANIZED HEALTH CARE EDUCATION/TRAINING PROGRAM
Payer: COMMERCIAL

## 2024-11-27 DIAGNOSIS — N34.2 INFECTIVE URETHRITIS: ICD-10-CM

## 2024-11-27 PROCEDURE — 87088 URINE BACTERIA CULTURE: CPT | Performed by: SPECIALIST

## 2024-11-27 PROCEDURE — 87186 SC STD MICRODIL/AGAR DIL: CPT | Performed by: SPECIALIST

## 2024-11-27 PROCEDURE — 87086 URINE CULTURE/COLONY COUNT: CPT | Performed by: SPECIALIST

## 2024-11-30 ENCOUNTER — HOSPITAL ENCOUNTER (EMERGENCY)
Facility: HOSPITAL | Age: 29
Discharge: HOME | End: 2024-11-30
Attending: SURGERY
Payer: COMMERCIAL

## 2024-11-30 VITALS
RESPIRATION RATE: 16 BRPM | BODY MASS INDEX: 33.35 KG/M2 | HEART RATE: 74 BPM | WEIGHT: 165.44 LBS | SYSTOLIC BLOOD PRESSURE: 133 MMHG | DIASTOLIC BLOOD PRESSURE: 72 MMHG | TEMPERATURE: 99 F | OXYGEN SATURATION: 100 % | HEIGHT: 59 IN

## 2024-11-30 DIAGNOSIS — N30.00 ACUTE CYSTITIS WITHOUT HEMATURIA: Primary | ICD-10-CM

## 2024-11-30 LAB
B-HCG UR QL: NEGATIVE
BACTERIA #/AREA URNS HPF: ABNORMAL /HPF
BACTERIA UR CULT: ABNORMAL
BILIRUB UR QL STRIP: NEGATIVE
CLARITY UR: CLEAR
COLOR UR: YELLOW
GLUCOSE UR QL STRIP: NEGATIVE
HGB UR QL STRIP: NEGATIVE
KETONES UR QL STRIP: NEGATIVE
LEUKOCYTE ESTERASE UR QL STRIP: ABNORMAL
MICROSCOPIC COMMENT: ABNORMAL
NITRITE UR QL STRIP: NEGATIVE
PH UR STRIP: 7 [PH] (ref 5–8)
PROT UR QL STRIP: NEGATIVE
SP GR UR STRIP: 1.01 (ref 1–1.03)
SQUAMOUS #/AREA URNS HPF: 1 /HPF
URN SPEC COLLECT METH UR: ABNORMAL
UROBILINOGEN UR STRIP-ACNC: NEGATIVE EU/DL
WBC #/AREA URNS HPF: 6 /HPF (ref 0–5)

## 2024-11-30 PROCEDURE — 63600175 PHARM REV CODE 636 W HCPCS

## 2024-11-30 PROCEDURE — 96372 THER/PROPH/DIAG INJ SC/IM: CPT

## 2024-11-30 PROCEDURE — 99284 EMERGENCY DEPT VISIT MOD MDM: CPT | Mod: 25

## 2024-11-30 PROCEDURE — 81000 URINALYSIS NONAUTO W/SCOPE: CPT

## 2024-11-30 PROCEDURE — 81025 URINE PREGNANCY TEST: CPT

## 2024-11-30 RX ORDER — CEFTRIAXONE 1 G/1
1 INJECTION, POWDER, FOR SOLUTION INTRAMUSCULAR; INTRAVENOUS
Status: COMPLETED | OUTPATIENT
Start: 2024-11-30 | End: 2024-11-30

## 2024-11-30 RX ORDER — SULFAMETHOXAZOLE AND TRIMETHOPRIM 800; 160 MG/1; MG/1
1 TABLET ORAL 2 TIMES DAILY
Qty: 14 TABLET | Refills: 0 | Status: SHIPPED | OUTPATIENT
Start: 2024-11-30 | End: 2024-12-07

## 2024-11-30 RX ORDER — LIDOCAINE HYDROCHLORIDE 10 MG/ML
2.1 INJECTION, SOLUTION INFILTRATION; PERINEURAL
Status: COMPLETED | OUTPATIENT
Start: 2024-11-30 | End: 2024-11-30

## 2024-11-30 RX ADMIN — LIDOCAINE HYDROCHLORIDE 2.1 ML: 10 INJECTION, SOLUTION INFILTRATION; PERINEURAL at 01:11

## 2024-11-30 RX ADMIN — CEFTRIAXONE SODIUM 1 G: 1 INJECTION, POWDER, FOR SOLUTION INTRAMUSCULAR; INTRAVENOUS at 01:11

## 2024-11-30 NOTE — ED PROVIDER NOTES
Encounter Date: 2024       History     Chief Complaint   Patient presents with    Back Pain    Dysuria     This note is dictated on M*Modal word recognition program.  There are word recognition mistakes and grammatical errors that are occasionally missed on review.     Zheng Beverly is a 29 y.o. female presents to ER today complaints of dysuria.  Patient reports she started with dysuria symptoms 3 days ago.  She came to this hospital to provide urine sample to laboratory.  Urine culture was positive for KLEBSIELLA AEROGENES.  Sensitivity report reviewed patient reports she has a history of frequent urinary tract infections.  Patient also reports associated lower back pain.      The history is provided by the patient.     Review of patient's allergies indicates:  No Known Allergies  History reviewed. No pertinent past medical history.  Past Surgical History:   Procedure Laterality Date     SECTION       No family history on file.  Social History     Tobacco Use    Smoking status: Never    Smokeless tobacco: Never     Review of Systems   Genitourinary:  Positive for dysuria and frequency.   Musculoskeletal:  Positive for back pain.   All other systems reviewed and are negative.      Physical Exam     Initial Vitals [24 1204]   BP Pulse Resp Temp SpO2   (!) 141/88 70 20 98.1 °F (36.7 °C) 99 %      MAP       --         Physical Exam    Constitutional: She appears well-developed and well-nourished. She is not diaphoretic. No distress.   HENT:   Head: Normocephalic and atraumatic.   Eyes: Pupils are equal, round, and reactive to light. Right eye exhibits no discharge. Left eye exhibits no discharge.   Neck: Neck supple.   Normal range of motion.  Cardiovascular:  Normal rate, regular rhythm and normal heart sounds.           Pulmonary/Chest: Breath sounds normal. No respiratory distress. She has no wheezes. She has no rales.   Abdominal: Abdomen is soft. Bowel sounds are normal. She exhibits no  distension. There is no abdominal tenderness. There is no rebound.   Musculoskeletal:         General: No tenderness or edema.      Cervical back: Normal range of motion and neck supple.     Neurological: She is alert and oriented to person, place, and time. GCS score is 15. GCS eye subscore is 4. GCS verbal subscore is 5. GCS motor subscore is 6.   Skin: Skin is warm. Capillary refill takes less than 2 seconds. No erythema.   Psychiatric: She has a normal mood and affect. Her behavior is normal. Thought content normal.         ED Course   Procedures  Labs Reviewed   URINALYSIS, REFLEX TO URINE CULTURE - Abnormal       Result Value    Specimen UA Urine, Clean Catch      Color, UA Yellow      Appearance, UA Clear      pH, UA 7.0      Specific Gravity, UA 1.010      Protein, UA Negative      Glucose, UA Negative      Ketones, UA Negative      Bilirubin (UA) Negative      Occult Blood UA Negative      Nitrite, UA Negative      Urobilinogen, UA Negative      Leukocytes, UA 2+ (*)     Narrative:     Specimen Source->Urine   URINALYSIS MICROSCOPIC - Abnormal    WBC, UA 6 (*)     Bacteria Few (*)     Squam Epithel, UA 1      Microscopic Comment SEE COMMENT      Narrative:     Specimen Source->Urine   PREGNANCY TEST, URINE RAPID    Preg Test, Ur Negative      Narrative:     Specimen Source->Urine          Imaging Results    None          Medications   cefTRIAXone injection 1 g (1 g Intramuscular Given 11/30/24 1330)   LIDOcaine HCL 10 mg/ml (1%) injection 2.1 mL (2.1 mLs Intramuscular Given 11/30/24 1315)     Medical Decision Making  Differential diagnosis includes urinary tract infection, positive urine culture, pyelonephritis    Patient's symptoms consistent with acute cystitis.  Will place patient on Bactrim.  Will additionally give patient injection of Rocephin prior to discharge today  KLEBSIELLA AEROGENES was sensitive to Bactrim on sensitivity report.  Vital signs stable today.  No fever.  No signs of  urosepsis.  Patient advised to follow-up with OBGYN as previously scheduled and urologist.  Patient stable at time of discharge in no acute distress.  No life-threatening illnesses were found during ER visit today.  Patient was instructed to follow-up with PCP or other recommended specialist within the next 48-72 hours.  Patient was instructed to return to ER immediately for any worsening or concerning symptoms.  All discharge instructions discussed with patient, and patient agrees to comply with discharge instructions given today.     Risk  Prescription drug management.                                      Clinical Impression:  Final diagnoses:  [N30.00] Acute cystitis without hematuria (Primary)          ED Disposition Condition    Discharge Stable          ED Prescriptions       Medication Sig Dispense Start Date End Date Auth. Provider    sulfamethoxazole-trimethoprim 800-160mg (BACTRIM DS) 800-160 mg Tab Take 1 tablet by mouth 2 (two) times daily. for 7 days 14 tablet 11/30/2024 12/7/2024 Ovi Zavala NP          Follow-up Information       Follow up With Specialties Details Why Contact Info    Mel Hammond MD Obstetrics and Gynecology In 3 days Keep your previously scheduled appointment [address] [address]  [phone]               Ovi Zavala NP  11/30/24 8689

## 2024-12-02 ENCOUNTER — TELEPHONE (OUTPATIENT)
Dept: UROLOGY | Facility: CLINIC | Age: 29
End: 2024-12-02
Payer: COMMERCIAL

## 2024-12-02 ENCOUNTER — PATIENT MESSAGE (OUTPATIENT)
Dept: UROLOGY | Facility: CLINIC | Age: 29
End: 2024-12-02
Payer: COMMERCIAL

## 2024-12-02 NOTE — TELEPHONE ENCOUNTER
----- Message from Med Assistant Calderon sent at 12/2/2024 10:00 AM CST -----  Regarding: FW: results  Contact: @ [phone]    ----- Message -----  From: Brigitte Desai  Sent: 11/30/2024  10:23 AM CST  To: Simi Buchanan Staff  Subject: results                                          Pt is calling to see if someone can call  in regards to recent lab/scans results done on 11/27 ..Please call and adv @ [phone]

## 2024-12-02 NOTE — TELEPHONE ENCOUNTER
Tried calling patient to notify of urine culture results, unable to reach. Wasn't able to leave voicemail due to it being full. Will send portal message to call clinic.

## 2024-12-02 NOTE — TELEPHONE ENCOUNTER
----- Message from Med Assistant Calderon sent at 12/2/2024 10:00 AM CST -----  Regarding: FW: results  Contact: @ [phone]    ----- Message -----  From: Brigitte Desai  Sent: 11/30/2024  10:23 AM CST  To: Simi Buchanan Staff  Subject: results                                          Pt is calling to see if someone can call  in regards to recent lab/scans results done on 11/27 ..Please call and adv @ [phone]

## 2024-12-02 NOTE — TELEPHONE ENCOUNTER
Patient returned call, states she went to the ER and was given an antibiotic through IV and placed on a round of antibiotics. States she has been feeling a lot better since then. Patient scheduled for follow up 12/13/24 9:15am, v/u.

## 2024-12-03 ENCOUNTER — OFFICE VISIT (OUTPATIENT)
Dept: OBSTETRICS AND GYNECOLOGY | Facility: CLINIC | Age: 29
End: 2024-12-03
Payer: COMMERCIAL

## 2024-12-03 VITALS
DIASTOLIC BLOOD PRESSURE: 74 MMHG | HEART RATE: 75 BPM | WEIGHT: 167.56 LBS | SYSTOLIC BLOOD PRESSURE: 114 MMHG | BODY MASS INDEX: 33.78 KG/M2 | HEIGHT: 59 IN

## 2024-12-03 DIAGNOSIS — Z12.39 ENCOUNTER FOR BREAST CANCER SCREENING USING NON-MAMMOGRAM MODALITY: ICD-10-CM

## 2024-12-03 DIAGNOSIS — Z12.4 CERVICAL CANCER SCREENING: ICD-10-CM

## 2024-12-03 DIAGNOSIS — Z01.419 WELL WOMAN EXAM WITH ROUTINE GYNECOLOGICAL EXAM: Primary | ICD-10-CM

## 2024-12-03 DIAGNOSIS — Z30.09 UNWANTED FERTILITY: ICD-10-CM

## 2024-12-03 PROCEDURE — 3078F DIAST BP <80 MM HG: CPT | Mod: CPTII,S$GLB,, | Performed by: OBSTETRICS & GYNECOLOGY

## 2024-12-03 PROCEDURE — 1159F MED LIST DOCD IN RCRD: CPT | Mod: CPTII,S$GLB,, | Performed by: OBSTETRICS & GYNECOLOGY

## 2024-12-03 PROCEDURE — 99999 PR PBB SHADOW E&M-EST. PATIENT-LVL III: CPT | Mod: PBBFAC,,, | Performed by: OBSTETRICS & GYNECOLOGY

## 2024-12-03 PROCEDURE — 1160F RVW MEDS BY RX/DR IN RCRD: CPT | Mod: CPTII,S$GLB,, | Performed by: OBSTETRICS & GYNECOLOGY

## 2024-12-03 PROCEDURE — 99385 PREV VISIT NEW AGE 18-39: CPT | Mod: S$GLB,,, | Performed by: OBSTETRICS & GYNECOLOGY

## 2024-12-03 PROCEDURE — 3074F SYST BP LT 130 MM HG: CPT | Mod: CPTII,S$GLB,, | Performed by: OBSTETRICS & GYNECOLOGY

## 2024-12-03 PROCEDURE — 3008F BODY MASS INDEX DOCD: CPT | Mod: CPTII,S$GLB,, | Performed by: OBSTETRICS & GYNECOLOGY

## 2024-12-04 ENCOUNTER — TELEPHONE (OUTPATIENT)
Dept: OBSTETRICS AND GYNECOLOGY | Facility: CLINIC | Age: 29
End: 2024-12-04
Payer: COMMERCIAL

## 2024-12-04 DIAGNOSIS — Z30.430 ENCOUNTER FOR IUD INSERTION: Primary | ICD-10-CM

## 2024-12-04 NOTE — TELEPHONE ENCOUNTER
Attempted to contact patient via telephone, left voicemail for patient to contact office.  Pt will need to sign [place] Medicaid tubal ligation form as her secondary insurance is Medicaid.   RE:  JEOVANNY

## 2024-12-04 NOTE — TELEPHONE ENCOUNTER
----- Message from Mel Hammond MD sent at 12/3/2024  2:44 PM CST -----  Patient desires lap salpingectomy

## 2024-12-05 NOTE — TELEPHONE ENCOUNTER
Patient has decided against BLS, desires IUD.  Order placed and appointment scheduled.  Patient verbalized understanding.

## 2024-12-06 NOTE — TELEPHONE ENCOUNTER
Patient would like to proceed with permanent sterilization.  Informed patient this is an irreversible procedure.  States she is done having children.  Form sent through portal message.  Once signed form is received I will contact to schedule >30 days from signing.  Pt verbalized understanding.

## 2024-12-13 ENCOUNTER — TELEPHONE (OUTPATIENT)
Dept: OBSTETRICS AND GYNECOLOGY | Facility: CLINIC | Age: 29
End: 2024-12-13
Payer: COMMERCIAL

## 2024-12-13 ENCOUNTER — OFFICE VISIT (OUTPATIENT)
Dept: UROLOGY | Facility: CLINIC | Age: 29
End: 2024-12-13
Attending: SPECIALIST
Payer: COMMERCIAL

## 2024-12-13 VITALS
HEIGHT: 59 IN | WEIGHT: 152.75 LBS | BODY MASS INDEX: 30.8 KG/M2 | HEART RATE: 78 BPM | SYSTOLIC BLOOD PRESSURE: 108 MMHG | OXYGEN SATURATION: 99 % | DIASTOLIC BLOOD PRESSURE: 62 MMHG

## 2024-12-13 DIAGNOSIS — N39.0 RECURRENT UTI: ICD-10-CM

## 2024-12-13 DIAGNOSIS — N31.9 NEUROGENIC BLADDER: Primary | ICD-10-CM

## 2024-12-13 PROCEDURE — 99999 PR PBB SHADOW E&M-EST. PATIENT-LVL III: CPT | Mod: PBBFAC,,, | Performed by: SPECIALIST

## 2024-12-13 RX ORDER — TAMSULOSIN HYDROCHLORIDE 0.4 MG/1
0.4 CAPSULE ORAL DAILY
Qty: 30 CAPSULE | Refills: 11 | Status: SHIPPED | OUTPATIENT
Start: 2024-12-13 | End: 2025-12-13

## 2024-12-13 NOTE — PROGRESS NOTES
"[facility] - Urology   Clinic Note    SUBJECTIVE:     Chief Complaint   Patient presents with    Follow-up     States she has been doing okay since last round of antibiotics.       Referral from: No ref. provider found.    History of Present Illness:  Zheng Beverly is a 29 y.o. female who presents to clinic for recurrent UTIs.    Patient recently treated with Bactrim for another symptomatic UTI.  Ucx positive, see below.  I favor chronic cystitis rather than IC.  She mentions she was taking tamsulosin several years ago for poor bladder emptying.  She has a hx of spina bifida.  Renal US without hydro or stones.  Unclear whether urodynamics have been performed.    History reviewed. No pertinent past medical history.    No current outpatient medications on file prior to visit.     No current facility-administered medications on file prior to visit.         OBJECTIVE:     Estimated body mass index is 30.86 kg/m² as calculated from the following:    Height as of this encounter: 4' 11" (1.499 m).    Weight as of this encounter: 69.3 kg (152 lb 12.5 oz).    Vital Signs (Most Recent)  Vitals:    12/13/24 0900   BP: 108/62   Pulse: 78       Physical Exam:    Physical Exam     GENERAL: patient sitting comfortably  HEENT: normocephalic  NECK: supple, no JVD  PULM: normal chest rise, no increased WOB  HEART: non-diaphoretic  ABDO: soft, nondistended, nontender  BACK: no CVA tenderness bilaterally  SKIN: warm, dry, well perfused  EXT: no bruising or edema  NEURO: grossly normal with no focal deficits  PSYCH: appropriate mood and affect    Genitourinary Exam:  deferred      LABS:     Lab Results   Component Value Date    BUN 9 06/10/2024    CREATININE 0.8 06/10/2024    WBC 4.54 06/09/2024    HGB 11.7 (L) 06/09/2024    HCT 36.6 (L) 06/09/2024     06/09/2024    AST 22 06/10/2024     (H) 06/10/2024    ALKPHOS 61 06/10/2024    ALBUMIN 3.6 06/10/2024    HGBA1C 5.2 03/04/2021        Urinalysis:   Urinalysis " Reflex   Date Value Ref Range Status   05/05/2022 Comment  Final     Comment:     This specimen will not reflex to a Urine Culture.      Urine Culture, Routine  Abnormal   KLEBSIELLA AEROGENES  > 100,000 cfu/ml  No other significant isolate    Resulting Agency [facility]        Susceptibility     KLEBSIELLA AEROGENES     CULTURE, URINE     Cefepime <=2 mcg/mL Sensitive     Ceftriaxone <=1 mcg/mL Sensitive     Ciprofloxacin <=0.25 mcg/mL Sensitive     Ertapenem <=0.5 mcg/mL Sensitive     Gentamicin <=2 mcg/mL Sensitive     Levofloxacin <=0.5 mcg/mL Sensitive     Meropenem <=1 mcg/mL Sensitive     Nitrofurantoin 64 mcg/mL Intermediate     Piperacillin/Tazo <=8 mcg/mL Sensitive     Tobramycin <=2 mcg/mL Sensitive     Trimeth/Sulfa <=2/38 mcg/mL Sensitive                   Imaging:  I have personally reviewed all relevant imaging studies.    No results found for this or any previous visit (from the past 2160 hours).  No results found for this or any previous visit (from the past 2160 hours).  US Retroperitoneal Complete  Narrative: EXAMINATION:  US RETROPERITONEAL COMPLETE    CLINICAL HISTORY:  Neuromuscular dysfunction of bladder, unspecified    TECHNIQUE:  Ultrasound of the kidneys and urinary bladder was performed including color flow and Doppler evaluation of the kidneys.    COMPARISON:  06/03/2024    FINDINGS:  Right kidney: The right kidney measures 12.3 cm. No cortical thinning. No loss of corticomedullary distinction. Resistive index measures 0.69.  No mass. No renal stone. No hydronephrosis.    Left kidney: The left kidney measures 8.3 cm. There is cortical thinning. No loss of corticomedullary distinction. Resistive index measures 0.61.  No mass. No renal stone. No hydronephrosis.    The bladder is partially distended at the time of scanning and has an unremarkable appearance.  Impression: No obstructive uropathy.  Atrophied left kidney.    Electronically signed by: Juan Pablo  Reece  Date:    08/16/2024  Time:    11:33         ASSESSMENT     1. Neurogenic bladder    2. Recurrent UTI        PLAN:     Voiding diary  Rx tamsulosin 0.4 mg qhs  Consider antibiotic prophylaxis  Consider Urodynamics  RTC 4-6 weeks      Dewayne Belcher MD  Urology  [facility]     Disclaimer: This note has been generated using voice-recognition software. There may be typographical errors that have been missed during proof-reading.

## 2024-12-13 NOTE — TELEPHONE ENCOUNTER
----- Message from Estephanie sent at 12/13/2024  9:38 AM CST -----  Contact: Self  Zheng Beverly  MRN: [id number]  Home Phone      Not on file.  Work Phone      Not on file.  Mobile          [phone]    Patient Care Team:  No, Primary Doctor as PCP - Konstantin Tate MD as Obstetrician (Obstetrics and Gynecology)  OB? No  What phone number can you be reached at? [phone]  Message: pt wants to schedule for IUD insertion while waiting for surgery

## 2024-12-16 ENCOUNTER — TELEPHONE (OUTPATIENT)
Dept: OBSTETRICS AND GYNECOLOGY | Facility: CLINIC | Age: 29
End: 2024-12-16
Payer: COMMERCIAL

## 2024-12-16 NOTE — TELEPHONE ENCOUNTER
----- Message from Estephanie sent at 12/16/2024 11:09 AM CST -----  Contact: Self  Zheng Beverly  MRN: [id number]  Home Phone      Not on file.  Work Phone      Not on file.  Mobile          [phone]    Patient Care Team:  No, Primary Doctor as PCP - Konstantin Tate MD as Obstetrician (Obstetrics and Gynecology)  [place]-Mel Pennington MD as Consulting Physician (Obstetrics and Gynecology)  OB? No  What phone number can you be reached at? [phone]  Message: has questions about tomorrow's procedure

## 2024-12-16 NOTE — TELEPHONE ENCOUNTER
Attempted to contact patient via telephone, left voicemail for patient to contact office.   RE:  appt 12/17/24

## 2025-01-30 ENCOUNTER — LAB VISIT (OUTPATIENT)
Dept: LAB | Facility: HOSPITAL | Age: 30
End: 2025-01-30
Attending: OBSTETRICS & GYNECOLOGY
Payer: COMMERCIAL

## 2025-01-30 ENCOUNTER — OFFICE VISIT (OUTPATIENT)
Dept: OBSTETRICS AND GYNECOLOGY | Facility: CLINIC | Age: 30
End: 2025-01-30
Payer: COMMERCIAL

## 2025-01-30 VITALS
WEIGHT: 166.75 LBS | HEIGHT: 59 IN | SYSTOLIC BLOOD PRESSURE: 112 MMHG | HEART RATE: 83 BPM | DIASTOLIC BLOOD PRESSURE: 66 MMHG | BODY MASS INDEX: 33.62 KG/M2

## 2025-01-30 DIAGNOSIS — N92.6 IRREGULAR PERIODS/MENSTRUAL CYCLES: Primary | ICD-10-CM

## 2025-01-30 DIAGNOSIS — N92.6 IRREGULAR PERIODS/MENSTRUAL CYCLES: ICD-10-CM

## 2025-01-30 LAB
ESTIMATED AVG GLUCOSE: 100 MG/DL (ref 68–131)
HBA1C MFR BLD: 5.1 % (ref 4–5.6)
PROGEST SERPL-MCNC: 1.4 NG/ML
PROLACTIN SERPL IA-MCNC: 19.6 NG/ML (ref 5.2–26.5)
TSH SERPL DL<=0.005 MIU/L-ACNC: 0.7 UIU/ML (ref 0.4–4)

## 2025-01-30 PROCEDURE — 36415 COLL VENOUS BLD VENIPUNCTURE: CPT | Performed by: OBSTETRICS & GYNECOLOGY

## 2025-01-30 PROCEDURE — 3078F DIAST BP <80 MM HG: CPT | Mod: CPTII,S$GLB,, | Performed by: OBSTETRICS & GYNECOLOGY

## 2025-01-30 PROCEDURE — 1159F MED LIST DOCD IN RCRD: CPT | Mod: CPTII,S$GLB,, | Performed by: OBSTETRICS & GYNECOLOGY

## 2025-01-30 PROCEDURE — 99213 OFFICE O/P EST LOW 20 MIN: CPT | Mod: S$GLB,,, | Performed by: OBSTETRICS & GYNECOLOGY

## 2025-01-30 PROCEDURE — 84144 ASSAY OF PROGESTERONE: CPT | Performed by: OBSTETRICS & GYNECOLOGY

## 2025-01-30 PROCEDURE — 84146 ASSAY OF PROLACTIN: CPT | Performed by: OBSTETRICS & GYNECOLOGY

## 2025-01-30 PROCEDURE — 83036 HEMOGLOBIN GLYCOSYLATED A1C: CPT | Performed by: OBSTETRICS & GYNECOLOGY

## 2025-01-30 PROCEDURE — 3074F SYST BP LT 130 MM HG: CPT | Mod: CPTII,S$GLB,, | Performed by: OBSTETRICS & GYNECOLOGY

## 2025-01-30 PROCEDURE — 3008F BODY MASS INDEX DOCD: CPT | Mod: CPTII,S$GLB,, | Performed by: OBSTETRICS & GYNECOLOGY

## 2025-01-30 PROCEDURE — 84443 ASSAY THYROID STIM HORMONE: CPT | Performed by: OBSTETRICS & GYNECOLOGY

## 2025-01-30 PROCEDURE — 99999 PR PBB SHADOW E&M-EST. PATIENT-LVL III: CPT | Mod: PBBFAC,,, | Performed by: OBSTETRICS & GYNECOLOGY

## 2025-01-30 NOTE — PROGRESS NOTES
Subjective:    Patient ID: Zheng Beverly is a 29 y.o. y.o. female.     Chief Complaint:   Chief Complaint   Patient presents with    Consult       History of Present Illness:  Zheng presents today to discuss wanting to conceive. She has a history of irregular cycles. She has two prior term CD. She reports her two prior pregnancies were spontaneous and unexpected.    Patient has spina bifida; she reports she had no issues with getting her spinal.       ROS:   Review of Systems   Constitutional:  Negative for activity change, appetite change, chills, diaphoresis, fatigue, fever and unexpected weight change.   HENT:  Negative for mouth sores and tinnitus.    Eyes:  Negative for discharge and visual disturbance.   Respiratory:  Negative for cough, shortness of breath and wheezing.    Cardiovascular:  Negative for chest pain, palpitations and leg swelling.   Gastrointestinal:  Negative for abdominal pain, bloating, blood in stool, constipation, diarrhea, nausea and vomiting.   Endocrine: Negative for diabetes, hair loss, hot flashes, hyperthyroidism and hypothyroidism.   Genitourinary:  Positive for menstrual problem. Negative for dysuria, flank pain, frequency, genital sores, hematuria, urgency, vaginal bleeding, vaginal discharge, vaginal pain, postcoital bleeding and vaginal odor.   Musculoskeletal:  Negative for back pain, joint swelling and myalgias.   Integumentary:  Negative for rash, acne, breast mass, nipple discharge and breast skin changes.   Neurological:  Negative for seizures, syncope, numbness and headaches.   Hematological:  Negative for adenopathy. Does not bruise/bleed easily.   Psychiatric/Behavioral:  Negative for depression and sleep disturbance. The patient is not nervous/anxious.    Breast: Negative for mass, mastodynia, nipple discharge and skin changes          Objective:    Vital Signs:  Vitals:    01/30/25 0952   BP: 112/66   Pulse: 83       Physical Exam:  General:  alert, cooperative, no  distress   Head Normocephalic, without obvious abnormality, atraumatic   Neck .supple, symmetrical, trachea midline   Skin:  Skin color, texture, turgor normal. No rashes or lesions   Abdomen:  soft, non-tender; bowel sounds normal   Pelvis: Deferred   Extremities extremities normal, atraumatic, no cyanosis or edema   Neurologic Grossly normal        Irregular periods/menstrual cycles  -     PROGESTERONE; Future; Expected date: 01/30/2025  -     TSH; Future; Expected date: 01/30/2025  -     Hemoglobin A1C; Future; Expected date: 01/30/2025  -     Prolactin; Future; Expected date: 01/30/2025      OTC PNV

## 2025-02-03 ENCOUNTER — TELEPHONE (OUTPATIENT)
Dept: OBSTETRICS AND GYNECOLOGY | Facility: CLINIC | Age: 30
End: 2025-02-03
Payer: COMMERCIAL

## 2025-02-03 NOTE — TELEPHONE ENCOUNTER
----- Message from Estephanie sent at 2/3/2025  8:14 AM CST -----  Contact: Self  Zheng Beverly  MRN: [id number]  Home Phone      Not on file.  Work Phone      Not on file.  Mobile          [phone]    Patient Care Team:  No, Primary Doctor as PCP - Konstantin Tate MD as Obstetrician (Obstetrics and Gynecology)  [place]-Mel Pennington MD as Consulting Physician (Obstetrics and Gynecology)  OB? No  What phone number can you be reached at? [phone]  Message: returning Dejah's call on results

## 2025-02-13 ENCOUNTER — OFFICE VISIT (OUTPATIENT)
Dept: UROLOGY | Facility: CLINIC | Age: 30
End: 2025-02-13
Attending: SPECIALIST
Payer: COMMERCIAL

## 2025-02-13 VITALS — HEIGHT: 59 IN | BODY MASS INDEX: 34 KG/M2 | WEIGHT: 168.63 LBS

## 2025-02-13 DIAGNOSIS — N39.0 RECURRENT UTI: Primary | ICD-10-CM

## 2025-02-13 PROCEDURE — 99214 OFFICE O/P EST MOD 30 MIN: CPT | Mod: S$GLB,,, | Performed by: SPECIALIST

## 2025-02-13 PROCEDURE — 3008F BODY MASS INDEX DOCD: CPT | Mod: CPTII,S$GLB,, | Performed by: SPECIALIST

## 2025-02-13 PROCEDURE — 1160F RVW MEDS BY RX/DR IN RCRD: CPT | Mod: CPTII,S$GLB,, | Performed by: SPECIALIST

## 2025-02-13 PROCEDURE — 1159F MED LIST DOCD IN RCRD: CPT | Mod: CPTII,S$GLB,, | Performed by: SPECIALIST

## 2025-02-13 PROCEDURE — 87086 URINE CULTURE/COLONY COUNT: CPT | Performed by: SPECIALIST

## 2025-02-13 PROCEDURE — 3044F HG A1C LEVEL LT 7.0%: CPT | Mod: CPTII,S$GLB,, | Performed by: SPECIALIST

## 2025-02-13 PROCEDURE — 99999 PR PBB SHADOW E&M-EST. PATIENT-LVL II: CPT | Mod: PBBFAC,,, | Performed by: SPECIALIST

## 2025-02-13 NOTE — PROGRESS NOTES
"[facility] - Urology   Clinic Note    SUBJECTIVE:     Chief Complaint   Patient presents with    Urinary Tract Infection     States she was seen for UTI at her pcp but is currently waiting for her culture results. States that she is having some back pain, was running fever and is currently on antibiotics. Is having some abdomen pain around her ribs       Referral from: No ref. provider found.    History of Present Illness:  Zheng Beverly is a 29 y.o. female who presents to clinic for possible IC.    Patient returns for follow up.  She was recently started on an empiric course of nitrofurantoin for another urinary tract infection.  Repeat urinalysis is pending.  Patient states that she always has back pain secondary to a urinary tract infection however I question whether she has interstitial cystitis.  I previously mentioned that I prefer her to send a sample for urine culture prior to starting on any antibiotics.  Regardless, we will go ahead and send another specimen for culture and sensitivity.      History reviewed. No pertinent past medical history.    Current Outpatient Medications on File Prior to Visit   Medication Sig Dispense Refill    tamsulosin (FLOMAX) 0.4 mg Cap Take 1 capsule (0.4 mg total) by mouth once daily. 30 capsule 11     No current facility-administered medications on file prior to visit.         OBJECTIVE:     Estimated body mass index is 34.06 kg/m² as calculated from the following:    Height as of this encounter: 4' 11" (1.499 m).    Weight as of this encounter: 76.5 kg (168 lb 10.4 oz).    Vital Signs (Most Recent)  There were no vitals filed for this visit.    Physical Exam:    Physical Exam     GENERAL: patient sitting comfortably  HEENT: normocephalic  NECK: supple, no JVD  PULM: normal chest rise, no increased WOB  HEART: non-diaphoretic  ABDO: soft, nondistended, nontender  BACK: no CVA tenderness bilaterally  SKIN: warm, dry, well perfused  EXT: no bruising or edema  NEURO: " grossly normal with no focal deficits  PSYCH: appropriate mood and affect    Genitourinary Exam:  deferred      LABS:     Lab Results   Component Value Date    BUN 9 06/10/2024    CREATININE 0.8 06/10/2024    WBC 4.54 06/09/2024    HGB 11.7 (L) 06/09/2024    HCT 36.6 (L) 06/09/2024     06/09/2024    AST 22 06/10/2024     (H) 06/10/2024    ALKPHOS 61 06/10/2024    ALBUMIN 3.6 06/10/2024    HGBA1C 5.1 01/30/2025        Urinalysis:   Urinalysis Reflex   Date Value Ref Range Status   05/05/2022 Comment  Final     Comment:     This specimen will not reflex to a Urine Culture.          Imaging:  I have personally reviewed all relevant imaging studies.    No results found for this or any previous visit (from the past 2160 hours).  No results found for this or any previous visit (from the past 2160 hours).  US Retroperitoneal Complete  Narrative: EXAMINATION:  US RETROPERITONEAL COMPLETE    CLINICAL HISTORY:  Neuromuscular dysfunction of bladder, unspecified    TECHNIQUE:  Ultrasound of the kidneys and urinary bladder was performed including color flow and Doppler evaluation of the kidneys.    COMPARISON:  06/03/2024    FINDINGS:  Right kidney: The right kidney measures 12.3 cm. No cortical thinning. No loss of corticomedullary distinction. Resistive index measures 0.69.  No mass. No renal stone. No hydronephrosis.    Left kidney: The left kidney measures 8.3 cm. There is cortical thinning. No loss of corticomedullary distinction. Resistive index measures 0.61.  No mass. No renal stone. No hydronephrosis.    The bladder is partially distended at the time of scanning and has an unremarkable appearance.  Impression: No obstructive uropathy.  Atrophied left kidney.    Electronically signed by: Juan Pablo Reece  Date:    08/16/2024  Time:    11:33         ASSESSMENT     1. Recurrent UTI        PLAN:   It remains unclear whether patient has interstitial cystitis/chronic pelvic pain syndrome versus recurrent  bacterial cystitis.  I stressed repeatedly that whenever she is symptomatic to drop off a sample at an [facility] lab for culture and sensitivity.  By the time she is evaluated in our clinic she has typically been started on an empiric course of antibiotics.  She is presently taking nitrofurantoin 100 mg b.i.d..  Plan is to touch base next week after we receive the results of her urine culture.    Dewayne Belcher MD  Urology  [facility]     Disclaimer: This note has been generated using voice-recognition software. There may be typographical errors that have been missed during proof-reading.

## 2025-02-15 LAB
BACTERIA UR CULT: NORMAL
BACTERIA UR CULT: NORMAL

## 2025-02-17 NOTE — PROGRESS NOTES
Subjective:    Patient ID: Zheng Beverly is a 29 y.o. y.o. female.     Chief Complaint: Annual Well Woman Exam     History of Present Illness:  Zheng presents today for Annual Well Woman exam. She describes her menses as regular every month without intermenstrual spotting.She denies pelvic pain.  She denies breast tenderness, masses, nipple discharge. She denies difficulty with urination or bowel movements. She is sexually active. Contraception is by no method.    Patient desires permanent sterilization.     The following portions of the patient's history were reviewed and updated as appropriate: allergies, current medications, past family history, past medical history, past social history, past surgical history and problem list.      Menstrual History:   Patient's last menstrual period was 2024 (exact date)..     OB History          2    Para   2    Term   2            AB        Living   2         SAB        IAB        Ectopic        Multiple        Live Births   2                 The following portions of the patient's history were reviewed and updated as appropriate: allergies, current medications, past family history, past medical history, past social history, past surgical history, and problem list.        ROS:     Review of Systems   Constitutional:  Negative for activity change, appetite change, chills, diaphoresis, fatigue, fever and unexpected weight change.   HENT:  Negative for mouth sores and tinnitus.    Eyes:  Negative for discharge and visual disturbance.   Respiratory:  Negative for cough, shortness of breath and wheezing.    Cardiovascular:  Negative for chest pain, palpitations and leg swelling.   Gastrointestinal:  Negative for abdominal pain, bloating, blood in stool, constipation, diarrhea, nausea and vomiting.   Endocrine: Negative for diabetes, hair loss, hot flashes, hyperthyroidism and hypothyroidism.   Genitourinary:  Positive for frequency and menstrual problem.  She was notified of this message and given the codes she will call back if she wishes to have these made.    "Negative for dysuria, flank pain, genital sores, hematuria, urgency, vaginal bleeding, vaginal discharge, vaginal pain, postcoital bleeding and vaginal odor.   Musculoskeletal:  Negative for back pain, joint swelling and myalgias.   Integumentary:  Negative for rash, acne, breast mass, nipple discharge and breast skin changes.   Neurological:  Negative for seizures, syncope, numbness and headaches.   Hematological:  Negative for adenopathy. Does not bruise/bleed easily.   Psychiatric/Behavioral:  Negative for depression and sleep disturbance. The patient is not nervous/anxious.    Breast: Negative for mass, mastodynia, nipple discharge and skin changes      Objective:    Vital Signs:  Vitals:    12/03/24 1420   BP: 114/74   Pulse: 75   Weight: 76 kg (167 lb 8.8 oz)   Height: 4' 11" (1.499 m)         Physical Exam:  General:  alert, cooperative, appears stated age   Skin:  Skin color, texture, turgor normal. No rashes or lesions   HEENT:  conjunctivae/corneas clear. PERRL.   Neck: supple, trachea midline, no adenopathy or thyromegally   Respiratory:  clear to auscultation bilaterally   Heart:  regular rate and rhythm, S1, S2 normal, no murmur, click, rub or gallop   Breasts:  no discharge, erythema, or tenderness   Abdomen:  normal findings: bowel sounds normal, no masses palpable, no organomegaly and soft, non-tender   Pelvis: External genitalia: normal general appearance  Urinary system: urethral meatus normal, bladder nontender  Vaginal: normal mucosa without prolapse or lesions, presence of blood  Cervix: normal appearance  Uterus: normal size, shape, position  Adnexa: normal size, nontender bilaterally   Extremities: Normal ROM; no edema, no cyanosis   Neurologial: Normal strength and tone. No focal numbness or weakness. Reflexes 2+ and equal.   Psychiatric: normal mood, speech, dress, and thought processes         Assessment:       Healthy female exam.     1. Well woman exam with routine gynecological exam  "   2. Encounter for breast cancer screening using non-mammogram modality    3. Cervical cancer screening          Plan:       Thin prep Pap smear.  Case request for BTL     COUNSELING:  Zheng was counseled on STD pevention, use and side-effects of various contraceptive measures, A.C.O.G. Pap guidelines and recommendations for yearly pelvic exams in addition to recommendations for monthly self breast exams; to see her PCP for other health maintenance.

## 2025-02-24 ENCOUNTER — TELEPHONE (OUTPATIENT)
Dept: UROLOGY | Facility: CLINIC | Age: 30
End: 2025-02-24
Payer: COMMERCIAL

## 2025-02-24 NOTE — TELEPHONE ENCOUNTER
----- Message from Med Assistant Calderon sent at 2/24/2025 11:10 AM CST -----  Contact: PATIENT    ----- Message -----  From: Juliann Cam  Sent: 2/21/2025   3:56 PM CST  To: Simi Coffman    Zheng SiriaMRN: [id number]: 1995PCP: No, Primary DoctorHome Phone      Not on file.Work Phone      Not on file.Mobile          [phone]: Patient is calling for the results of a urine culture that was done on 02/13/25Phone: [phone]

## 2025-02-24 NOTE — TELEPHONE ENCOUNTER
----- Message from Med Assistant Calderon sent at 2/24/2025 11:10 AM CST -----  Contact: PATIENT    ----- Message -----  From: Juliann Cam  Sent: 2/21/2025   3:56 PM CST  To: Simi Coffman    Zheng SiriaMRN: [id number]: 1995PCP: No, Primary DoctorHome Phone      Not on file.Work Phone      Not on file.Mobile          [phone]: Patient is calling for the results of a urine culture that was done on 02/13/25Phone: [phone]

## 2025-02-24 NOTE — TELEPHONE ENCOUNTER
Patient returned call and notified her of ucx results. States she is no longer having any symptoms and that her ucx results from [facility] came back positive. Notified her that per Dr. Belcher if she is having recurrent symptoms in about a week or two to call the clinic for a follow up and he'll work her up for IC. Went over IC and procedure that he does in OR to help ease some of the bladder discomfort. Patient v/u.

## 2025-03-25 ENCOUNTER — OFFICE VISIT (OUTPATIENT)
Dept: OBSTETRICS AND GYNECOLOGY | Facility: CLINIC | Age: 30
End: 2025-03-25
Payer: COMMERCIAL

## 2025-03-25 VITALS
BODY MASS INDEX: 34.02 KG/M2 | HEIGHT: 59 IN | DIASTOLIC BLOOD PRESSURE: 74 MMHG | HEART RATE: 88 BPM | SYSTOLIC BLOOD PRESSURE: 122 MMHG | WEIGHT: 168.75 LBS

## 2025-03-25 DIAGNOSIS — N91.2 AMENORRHEA: Primary | ICD-10-CM

## 2025-03-25 LAB
B-HCG UR QL: POSITIVE
CTP QC/QA: YES

## 2025-03-25 PROCEDURE — 3078F DIAST BP <80 MM HG: CPT | Mod: CPTII,S$GLB,, | Performed by: OBSTETRICS & GYNECOLOGY

## 2025-03-25 PROCEDURE — 3044F HG A1C LEVEL LT 7.0%: CPT | Mod: CPTII,S$GLB,, | Performed by: OBSTETRICS & GYNECOLOGY

## 2025-03-25 PROCEDURE — 81025 URINE PREGNANCY TEST: CPT | Mod: S$GLB,,, | Performed by: OBSTETRICS & GYNECOLOGY

## 2025-03-25 PROCEDURE — 99999 PR PBB SHADOW E&M-EST. PATIENT-LVL III: CPT | Mod: PBBFAC,,, | Performed by: OBSTETRICS & GYNECOLOGY

## 2025-03-25 PROCEDURE — 1159F MED LIST DOCD IN RCRD: CPT | Mod: CPTII,S$GLB,, | Performed by: OBSTETRICS & GYNECOLOGY

## 2025-03-25 PROCEDURE — 99213 OFFICE O/P EST LOW 20 MIN: CPT | Mod: S$GLB,,, | Performed by: OBSTETRICS & GYNECOLOGY

## 2025-03-25 PROCEDURE — 3074F SYST BP LT 130 MM HG: CPT | Mod: CPTII,S$GLB,, | Performed by: OBSTETRICS & GYNECOLOGY

## 2025-03-25 PROCEDURE — 3008F BODY MASS INDEX DOCD: CPT | Mod: CPTII,S$GLB,, | Performed by: OBSTETRICS & GYNECOLOGY

## 2025-03-25 NOTE — PROGRESS NOTES
Subjective:    Patient ID: Zheng Beverly is a 29 y.o. y.o. female.     Chief Complaint:   Chief Complaint   Patient presents with    Possible Pregnancy       History of Present Illness:  Zheng presents today for a follow up. She was seen on 1/30 to discuss conceiving. She had day 21 labs done on 1/30 which indicated she did not ovulated. Patient reports she had a cycle on 2/7/25 and reports she took a pregnancy test after she was late. In office UPT positive today.         ROS:   Review of Systems   Constitutional:  Negative for activity change, appetite change, chills, diaphoresis, fatigue, fever and unexpected weight change.   HENT:  Negative for mouth sores and tinnitus.    Eyes:  Negative for discharge and visual disturbance.   Respiratory:  Negative for cough, shortness of breath and wheezing.    Cardiovascular:  Negative for chest pain, palpitations and leg swelling.   Gastrointestinal:  Negative for abdominal pain, bloating, blood in stool, constipation, diarrhea, nausea and vomiting.   Endocrine: Negative for diabetes, hair loss, hot flashes, hyperthyroidism and hypothyroidism.   Genitourinary:  Negative for dysuria, flank pain, frequency, genital sores, hematuria, urgency, vaginal bleeding, vaginal discharge, vaginal pain, postcoital bleeding and vaginal odor.   Musculoskeletal:  Negative for back pain, joint swelling and myalgias.   Integumentary:  Negative for rash, acne, breast mass, nipple discharge and breast skin changes.   Neurological:  Negative for seizures, syncope, numbness and headaches.   Hematological:  Negative for adenopathy. Does not bruise/bleed easily.   Psychiatric/Behavioral:  Negative for depression and sleep disturbance. The patient is not nervous/anxious.    Breast: Negative for mass, mastodynia, nipple discharge and skin changes          Objective:    Vital Signs:  Vitals:    03/25/25 1536   BP: 122/74   Pulse: 88       Physical Exam:  General:  alert, cooperative, no distress    Head Normocephalic, without obvious abnormality, atraumatic   Neck .supple, symmetrical, trachea midline   Skin:  Skin color, texture, turgor normal. No rashes or lesions   Abdomen:  soft, non-tender; bowel sounds normal   Pelvis: Deferred   Extremities extremities normal, atraumatic, no cyanosis or edema   Neurologic Grossly normal        UPT: positive    Amenorrhea  -     POCT Urine Pregnancy      Will make appt with OB nurse navigator

## 2025-04-02 ENCOUNTER — CLINICAL SUPPORT (OUTPATIENT)
Dept: OBSTETRICS AND GYNECOLOGY | Facility: CLINIC | Age: 30
End: 2025-04-02
Payer: COMMERCIAL

## 2025-04-02 ENCOUNTER — PATIENT MESSAGE (OUTPATIENT)
Dept: OBSTETRICS AND GYNECOLOGY | Facility: CLINIC | Age: 30
End: 2025-04-02

## 2025-04-02 DIAGNOSIS — N91.2 ABSENT MENSES: Primary | ICD-10-CM

## 2025-04-02 PROCEDURE — 99999 PR PBB SHADOW E&M-EST. PATIENT-LVL II: CPT | Mod: PBBFAC,,,

## 2025-04-02 RX ORDER — OMEGA-3S/DHA/EPA/FISH OIL 1000-1400
2 CAPSULE,DELAYED RELEASE (ENTERIC COATED) ORAL
COMMUNITY

## 2025-04-02 NOTE — PROGRESS NOTES
Spoke with patient for approximately 30 minutes during OB navigator virtual visit.  Updated chart to reflect up to date patient demographics.  Allergies, medications, pharmacy, medical, surgical and family history updated. Substance and sexual activity, marital status, gender identity and OB/Gyn history updated.   Patient was guided through expectations of care during pregnancy. Pregnancy confirmation, dating u/s & first OB appts scheduled.  New pregnancy education offered. Education declined. All questions answered. Encouraged to send message or call office with any questions/concerns. Verbalized understanding.     Discussed with pt:    Lmp 2/7/25; +UPT on 3/25/25 in clinic, currently 7 weeks 5 days; MYLES 11/14/25  Encouraged to begin taking PNV daily  C/o nausea ll day; denies vomiting:   common in 1st tri  discussed safe options per Pregnancy A to Z/AWHONN guide  Denies cramping/spotting:   may be normal to have mild cramping/light spotting, torsten in 1st tri & with sexual activity  Precautions/warning signs discussed:   encouraged to go to ED for excessive vag d/c, saturating a pad, bright red bleeding, painful cramping worse than menstrual cramps/abd pain that is interrupting daily activity  Constipation - increase fiber and water hydration, ok to take Colace or Metamucil  InBasket message to pt through pt portal with information regarding the websiste [email or web address] for access to the Pregnancy A to Z guide/AWHONN guide and Prenatal Class schedule. Informed of ConnectedMOM program & encouraged to participate, Get Ready to Meet Your Baby pamphlet, Coffective zoran and how to obtain a breast pump through insurance toward end of pregnancy

## 2025-04-07 ENCOUNTER — TELEPHONE (OUTPATIENT)
Dept: OBSTETRICS AND GYNECOLOGY | Facility: CLINIC | Age: 30
End: 2025-04-07
Payer: COMMERCIAL

## 2025-04-07 NOTE — TELEPHONE ENCOUNTER
Letter for Approved Dental Services During Pregnancy completed and faxed to [facility] at [phone] with fax confirmation received.

## 2025-04-07 NOTE — TELEPHONE ENCOUNTER
Message  Received: Today   Pt Advice  Diamond Wright, MA  P Stephany Leal Staff  Caller: self (Today, 12:47 PM)  Zheng Beverly  MRN: [id number]  Home Phone      Not on file.  Work Phone      Not on file.  Mobile          [phone]    Patient Care Team:  No, Primary Doctor as PCP - Konstantin Tate MD as Obstetrician (Obstetrics and Gynecology)  Mel Hammond MD as Consulting Physician (Obstetrics and Gynecology)  Toro Prieto MD as Obstetrician (Obstetrics)  OB? No  What phone number can you be reached at? [phone]  Message: Needs letter sent to [facility] in [place] stating ok to have dental cleaning while pregnant.  Stated has appt today at 1:00 pm.

## 2025-04-09 ENCOUNTER — PROCEDURE VISIT (OUTPATIENT)
Dept: MATERNAL FETAL MEDICINE | Facility: CLINIC | Age: 30
End: 2025-04-09
Payer: COMMERCIAL

## 2025-04-09 ENCOUNTER — LAB VISIT (OUTPATIENT)
Dept: LAB | Facility: HOSPITAL | Age: 30
End: 2025-04-09
Attending: OBSTETRICS & GYNECOLOGY
Payer: COMMERCIAL

## 2025-04-09 ENCOUNTER — OFFICE VISIT (OUTPATIENT)
Dept: OBSTETRICS AND GYNECOLOGY | Facility: CLINIC | Age: 30
End: 2025-04-09
Payer: COMMERCIAL

## 2025-04-09 VITALS
HEIGHT: 59 IN | HEART RATE: 84 BPM | DIASTOLIC BLOOD PRESSURE: 84 MMHG | SYSTOLIC BLOOD PRESSURE: 120 MMHG | WEIGHT: 164.88 LBS | BODY MASS INDEX: 33.24 KG/M2

## 2025-04-09 DIAGNOSIS — N91.2 ABSENT MENSES: ICD-10-CM

## 2025-04-09 DIAGNOSIS — Z86.32 HISTORY OF GESTATIONAL DIABETES: ICD-10-CM

## 2025-04-09 DIAGNOSIS — Z98.891 HISTORY OF CESAREAN DELIVERY: ICD-10-CM

## 2025-04-09 DIAGNOSIS — Z32.01 POSITIVE PREGNANCY TEST: Primary | ICD-10-CM

## 2025-04-09 DIAGNOSIS — Z32.01 POSITIVE PREGNANCY TEST: ICD-10-CM

## 2025-04-09 DIAGNOSIS — Z11.3 SCREEN FOR SEXUALLY TRANSMITTED DISEASES: ICD-10-CM

## 2025-04-09 DIAGNOSIS — Z30.09 UNWANTED FERTILITY: ICD-10-CM

## 2025-04-09 DIAGNOSIS — O21.9 NAUSEA AND VOMITING IN PREGNANCY: ICD-10-CM

## 2025-04-09 LAB
ABSOLUTE EOSINOPHIL (OHS): 0.37 K/UL
ABSOLUTE MONOCYTE (OHS): 0.72 K/UL (ref 0.3–1)
ABSOLUTE NEUTROPHIL COUNT (OHS): 6.89 K/UL (ref 1.8–7.7)
ANION GAP (OHS): 9 MMOL/L (ref 8–16)
BASOPHILS # BLD AUTO: 0.06 K/UL
BASOPHILS NFR BLD AUTO: 0.6 %
BUN SERPL-MCNC: 6 MG/DL (ref 6–20)
CALCIUM SERPL-MCNC: 9.4 MG/DL (ref 8.7–10.5)
CHLORIDE SERPL-SCNC: 107 MMOL/L (ref 95–110)
CO2 SERPL-SCNC: 19 MMOL/L (ref 23–29)
CREAT SERPL-MCNC: 0.7 MG/DL (ref 0.5–1.4)
ERYTHROCYTE [DISTWIDTH] IN BLOOD BY AUTOMATED COUNT: 13.8 % (ref 11.5–14.5)
GFR SERPLBLD CREATININE-BSD FMLA CKD-EPI: >60 ML/MIN/1.73/M2
GLUCOSE SERPL-MCNC: 127 MG/DL (ref 70–110)
HBV SURFACE AG SERPL QL IA: NORMAL
HCT VFR BLD AUTO: 39.9 % (ref 37–48.5)
HCV AB SERPL QL IA: NORMAL
HGB BLD-MCNC: 13.1 GM/DL (ref 12–16)
HIV 1+2 AB+HIV1 P24 AG SERPL QL IA: NORMAL
IMM GRANULOCYTES # BLD AUTO: 0.11 K/UL (ref 0–0.04)
IMM GRANULOCYTES NFR BLD AUTO: 1.1 % (ref 0–0.5)
INDIRECT COOMBS: NORMAL
LYMPHOCYTES # BLD AUTO: 2.28 K/UL (ref 1–4.8)
MCH RBC QN AUTO: 26.4 PG (ref 27–31)
MCHC RBC AUTO-ENTMCNC: 32.8 G/DL (ref 32–36)
MCV RBC AUTO: 80 FL (ref 82–98)
NUCLEATED RBC (/100WBC) (OHS): 0 /100 WBC
PLATELET # BLD AUTO: 280 K/UL (ref 150–450)
PMV BLD AUTO: 9 FL (ref 9.2–12.9)
POTASSIUM SERPL-SCNC: 3.9 MMOL/L (ref 3.5–5.1)
RBC # BLD AUTO: 4.96 M/UL (ref 4–5.4)
RELATIVE EOSINOPHIL (OHS): 3.5 %
RELATIVE LYMPHOCYTE (OHS): 21.9 % (ref 18–48)
RELATIVE MONOCYTE (OHS): 6.9 % (ref 4–15)
RELATIVE NEUTROPHIL (OHS): 66 % (ref 38–73)
RH BLD: NORMAL
SODIUM SERPL-SCNC: 135 MMOL/L (ref 136–145)
SPECIMEN OUTDATE: NORMAL
T PALLIDUM IGG+IGM SER QL: NORMAL
WBC # BLD AUTO: 10.43 K/UL (ref 3.9–12.7)

## 2025-04-09 PROCEDURE — 87491 CHLMYD TRACH DNA AMP PROBE: CPT | Performed by: OBSTETRICS & GYNECOLOGY

## 2025-04-09 PROCEDURE — 85025 COMPLETE CBC W/AUTO DIFF WBC: CPT

## 2025-04-09 PROCEDURE — 86593 SYPHILIS TEST NON-TREP QUANT: CPT

## 2025-04-09 PROCEDURE — 86900 BLOOD TYPING SEROLOGIC ABO: CPT | Performed by: OBSTETRICS & GYNECOLOGY

## 2025-04-09 PROCEDURE — 36415 COLL VENOUS BLD VENIPUNCTURE: CPT

## 2025-04-09 PROCEDURE — 86762 RUBELLA ANTIBODY: CPT

## 2025-04-09 PROCEDURE — 76801 OB US < 14 WKS SINGLE FETUS: CPT | Mod: S$GLB,,, | Performed by: OBSTETRICS & GYNECOLOGY

## 2025-04-09 PROCEDURE — 99999 PR PBB SHADOW E&M-EST. PATIENT-LVL III: CPT | Mod: PBBFAC,,, | Performed by: OBSTETRICS & GYNECOLOGY

## 2025-04-09 PROCEDURE — 87389 HIV-1 AG W/HIV-1&-2 AB AG IA: CPT

## 2025-04-09 PROCEDURE — 86803 HEPATITIS C AB TEST: CPT

## 2025-04-09 PROCEDURE — 84132 ASSAY OF SERUM POTASSIUM: CPT

## 2025-04-09 PROCEDURE — 87340 HEPATITIS B SURFACE AG IA: CPT

## 2025-04-09 PROCEDURE — 81220 CFTR GENE COM VARIANTS: CPT

## 2025-04-09 RX ORDER — ONDANSETRON 4 MG/1
4 TABLET, ORALLY DISINTEGRATING ORAL EVERY 6 HOURS PRN
Qty: 30 TABLET | Refills: 3 | Status: SHIPPED | OUTPATIENT
Start: 2025-04-09 | End: 2025-05-09

## 2025-04-09 NOTE — PROGRESS NOTES
Subjective:   Patient ID: Zheng Beverly is a 29 y.o. y.o. female.     Chief Complaint: Missed Menses       History of Present Illness:    Zheng presents today complaining of amenorrhea. Patient's last menstrual period was 2025 (exact date).. Prior menstrual cycles have been regular. She reports no other symptoms. UPT is positive.     History of two prior CDs. Reports nausea and vomiting; rx of Zofran sent.       Past Medical History:   Diagnosis Date    Urinary incontinence      Past Surgical History:   Procedure Laterality Date     SECTION      boy: Alejandro Hampton     SECTION      boy: Gianluca     Social History     Socioeconomic History    Marital status:    Tobacco Use    Smoking status: Former     Types: Vaping with nicotine     Start date:      Passive exposure: Never    Smokeless tobacco: Never   Substance and Sexual Activity    Alcohol use: Not Currently    Drug use: Never    Sexual activity: Yes     Partners: Male     Birth control/protection: None     Social Drivers of Health     Food Insecurity: No Food Insecurity (2024)    Received from [facility]    Hunger Vital Sign     Worried About Running Out of Food in the Last Year: Never true     Ran Out of Food in the Last Year: Never true   Transportation Needs: No Transportation Needs (2024)    Received from [facility]    PRAPARE - Transportation     Lack of Transportation (Medical): No     Lack of Transportation (Non-Medical): No   Physical Activity: Patient Unable To Answer (2024)    Received from [facility]    Exercise Vital Sign     Days of Exercise per Week: Patient unable to answer     Minutes of Exercise per Session: Patient unable to answer   Stress: No Stress Concern Present (2024)    Received from [facility]    [employer] - Occupational Stress Questionnaire     Feeling of Stress : Not at all   Housing Stability: Low Risk  (2024)    Received from  [facility]    Housing Stability Vital Sign     Unable to Pay for Housing in the Last Year: No     Number of Times Moved in the Last Year: 0     Homeless in the Last Year: No     Family History   Problem Relation Name Age of Onset    Diabetes Paternal Grandfather       OB History    Para Term  AB Living   3 2 2   2   SAB IAB Ectopic Multiple Live Births       2      # Outcome Date GA Lbr Johnny/2nd Weight Sex Type Anes PTL Lv   3 Current            2 Term 24 39w1d  3.629 kg (8 lb) M CS-LTranv EPI  GIOVANNI   1 Term 10/04/19 38w0d  3.515 kg (7 lb 12 oz) M CS-LTranv EPI  GIOVANNI         ROS:   Constitutional: Negative for chills, fever and weight loss.   HENT: Negative for congestion, ear discharge, hearing loss and nosebleeds.   Eyes: Negative for blurred vision and double vision.   Respiratory: Negative for cough, hemoptysis, sputum production and shortness of breath.   Cardiovascular: Negative for chest pain, palpitations and orthopnea.   Gastrointestinal: Negative for abdominal pain, heartburn, nausea and vomiting.   Genitourinary: Negative for dysuria, frequency, hematuria and urgency.   Musculoskeletal: Negative for back pain, myalgias and neck pain.   Skin: Negative for itching and rash.   Neurological: Negative for dizziness, tingling, tremors and headaches.   Endo/Heme/Allergies: Negative for environmental allergies and polydipsia. Does not bruise/bleed easily.   Psychiatric/Behavioral: Negative for depression, hallucinations and substance abuse. The patient is not nervous/anxious.       Objective:   Vital Signs:  Vitals:    25 1003   BP: 120/84   Pulse: 84         Physical Exam:  General:  alert, cooperative, appears stated age   Skin:  Skin color, texture, turgor normal. No rashes or lesions   HEENT:  conjunctivae/corneas clear. PERRL.   Neck: supple, trachea midline, no adenopathy or thyromegally   Respiratory:  clear to auscultation bilaterally   Heart:  regular rate and rhythm, S1, S2  normal, no murmur, click, rub or gallop   Breasts:   deferred   Abdomen:  normal findings: bowel sounds normal, no masses palpable, no organomegaly and soft, non-tender   Pelvis: Deferred   Extremities: Normal ROM; no edema, no cyanosis   Neurologial: Normal strength and tone. No focal numbness or weakness. Reflexes 2+ and equal.   Psychiatric: normal mood, speech, dress, and thought processes     Assessment:      1. Positive pregnancy test    2. Screen for sexually transmitted diseases    3. History of gestational diabetes    4. History of  delivery    5. Unwanted fertility    6. Nausea and vomiting in pregnancy          Plan:      Positive pregnancy test  -     Hepatitis C Antibody; Future; Expected date: 2025  -     HIV 1/2 Ag/Ab (4th Gen); Future; Expected date: 2025  -     Treponema Pallidium Antibodies IgG, IgM; Future; Expected date: 2025  -     Hepatitis B surface antigen; Future; Expected date: 2025  -     Type & Screen; Future; Expected date: 2025  -     Rubella antibody, IgG; Future; Expected date: 2025  -     CBC auto differential; Future; Expected date: 2025  -     Basic metabolic panel; Future; Expected date: 2025  -     Cystic Fibrosis Mutation Panel; Future; Expected date: 2025    Screen for sexually transmitted diseases  -     C. trachomatis/N. gonorrhoeae by AMP DNA [facility]; Urine    History of gestational diabetes    History of  delivery    Unwanted fertility    Nausea and vomiting in pregnancy  -     ondansetron (ZOFRAN-ODT) 4 MG TbDL; Take 1 tablet (4 mg total) by mouth every 6 (six) hours as needed (Nausea).  Dispense: 30 tablet; Refill: 3      Dating scan today  ASA 81 mg   Labs  Rx of Zofran  Desires RLTCS/BTL  RTC in 4 weeks    Patient was counseled today on proper weight gain based on the [place] [employer]'s recommendations based on her pre-pregnancy weight. Discussed foods to avoid in pregnancy (i.e. sushi, fish that  are high in mercury, deli meat, and unpasteurized cheeses). Discussed prenatal vitamin options (i.e. stool softener, DHA). She was also counseled on safe, healthy behavior as well as medications safe in pregnancy.

## 2025-04-10 LAB
RUBV IGG SER-ACNC: 23.1 IU/ML
RUBV IGG SER-IMP: REACTIVE

## 2025-04-12 LAB
C TRACH DNA SPEC QL NAA+PROBE: NOT DETECTED
CTGC SOURCE (OHS) ORD-325: NORMAL
N GONORRHOEA DNA UR QL NAA+PROBE: NOT DETECTED

## 2025-04-13 ENCOUNTER — RESULTS FOLLOW-UP (OUTPATIENT)
Dept: OBSTETRICS AND GYNECOLOGY | Facility: CLINIC | Age: 30
End: 2025-04-13

## 2025-04-16 LAB — W CYSTIC FIBROSIS MUTATION ANALYSIS: NORMAL

## 2025-05-07 ENCOUNTER — INITIAL PRENATAL (OUTPATIENT)
Dept: OBSTETRICS AND GYNECOLOGY | Facility: CLINIC | Age: 30
End: 2025-05-07
Payer: COMMERCIAL

## 2025-05-07 ENCOUNTER — PATIENT MESSAGE (OUTPATIENT)
Dept: OBSTETRICS AND GYNECOLOGY | Facility: CLINIC | Age: 30
End: 2025-05-07

## 2025-05-07 VITALS
HEART RATE: 88 BPM | SYSTOLIC BLOOD PRESSURE: 116 MMHG | DIASTOLIC BLOOD PRESSURE: 80 MMHG | BODY MASS INDEX: 33.02 KG/M2 | WEIGHT: 163.5 LBS

## 2025-05-07 DIAGNOSIS — Z98.891 HISTORY OF CESAREAN DELIVERY: ICD-10-CM

## 2025-05-07 DIAGNOSIS — Z3A.11 11 WEEKS GESTATION OF PREGNANCY: ICD-10-CM

## 2025-05-07 DIAGNOSIS — Z30.09 UNWANTED FERTILITY: ICD-10-CM

## 2025-05-07 DIAGNOSIS — Z86.32 HISTORY OF GESTATIONAL DIABETES: ICD-10-CM

## 2025-05-07 DIAGNOSIS — O23.41 URINARY TRACT INFECTION IN MOTHER DURING FIRST TRIMESTER OF PREGNANCY: ICD-10-CM

## 2025-05-07 DIAGNOSIS — Z34.81 ENCOUNTER FOR SUPERVISION OF OTHER NORMAL PREGNANCY IN FIRST TRIMESTER: Primary | ICD-10-CM

## 2025-05-07 PROCEDURE — 87086 URINE CULTURE/COLONY COUNT: CPT | Performed by: OBSTETRICS & GYNECOLOGY

## 2025-05-07 PROCEDURE — 99999 PR PBB SHADOW E&M-EST. PATIENT-LVL II: CPT | Mod: PBBFAC,,, | Performed by: OBSTETRICS & GYNECOLOGY

## 2025-05-07 PROCEDURE — 0502F SUBSEQUENT PRENATAL CARE: CPT | Mod: CPTII,S$GLB,, | Performed by: OBSTETRICS & GYNECOLOGY

## 2025-05-07 RX ORDER — CEPHALEXIN 500 MG/1
500 CAPSULE ORAL 4 TIMES DAILY
Qty: 28 CAPSULE | Refills: 0 | Status: SHIPPED | OUTPATIENT
Start: 2025-05-07 | End: 2025-05-14

## 2025-05-07 NOTE — PROGRESS NOTES
Patient with no obstetrical complaints. Reviewed prenatal labs with patient. Reviewed dating criteria. Discussed proper nutrition and weight gain in pregnancy. No vaginal bleeding or cramping noted. SAB precautions discussed. Patient to RTC in 4 weeks.     Connected mom ordered. CffDNA ordered.     Urine dip + nitrites. Culture sent. Rx of Keflex (reviewed prior culture)    Vitals signs, FHTs, urine dip, and PE findings documented, reviewed and available in OB flow chart.       I spent a total of 20 minutes on the day of the visit.This includes face to face time and non-face to face time preparing to see the patient (eg, review of tests), Obtaining and/or reviewing separately obtained history, Documenting clinical information in the electronic or other health record, Independently interpreting resultsand communicating results to the patient/family/caregiver, or Care coordination.     Initial ob packet supplied to patient. Contents supplied and discussed include medications safe in pregnancy, pregnancy A to Z, class schedule, pregnancy checklist, car seat safety, and handout on skin to skin and breastfeeding. Coeffective zoran card supplied and discussed.

## 2025-05-09 LAB — BACTERIA UR CULT: ABNORMAL

## 2025-05-13 ENCOUNTER — TELEPHONE (OUTPATIENT)
Dept: OBSTETRICS AND GYNECOLOGY | Facility: CLINIC | Age: 30
End: 2025-05-13
Payer: COMMERCIAL

## 2025-05-13 ENCOUNTER — RESULTS FOLLOW-UP (OUTPATIENT)
Dept: OBSTETRICS AND GYNECOLOGY | Facility: CLINIC | Age: 30
End: 2025-05-13

## 2025-05-13 NOTE — TELEPHONE ENCOUNTER
Patient is asking if the ABX, Keflex, that she is taking treats the UTI she has. Patient saw results in the portal. Please advise.

## 2025-05-13 NOTE — TELEPHONE ENCOUNTER
Yes, please let her know that it is similar to the Cefazolin and Ceftriaxone that is listed as sensitive. Same class.

## 2025-05-23 ENCOUNTER — HOSPITAL ENCOUNTER (OUTPATIENT)
Facility: HOSPITAL | Age: 30
Discharge: HOME | End: 2025-05-24
Admitting: STUDENT IN AN ORGANIZED HEALTH CARE EDUCATION/TRAINING PROGRAM
Payer: COMMERCIAL

## 2025-05-23 ENCOUNTER — TELEPHONE (OUTPATIENT)
Dept: EMERGENCY MEDICINE | Facility: HOSPITAL | Age: 30
End: 2025-05-23
Payer: COMMERCIAL

## 2025-05-23 DIAGNOSIS — O23.00 PYELONEPHRITIS AFFECTING PREGNANCY: ICD-10-CM

## 2025-05-23 PROBLEM — O23.02 PYELONEPHRITIS AFFECTING PREGNANCY IN SECOND TRIMESTER: Status: ACTIVE | Noted: 2025-05-23

## 2025-05-23 PROBLEM — R74.8 ELEVATED LIVER ENZYMES: Status: RESOLVED | Noted: 2024-06-09 | Resolved: 2025-05-23

## 2025-05-23 LAB
ABSOLUTE EOSINOPHIL (OHS): 0.11 K/UL
ABSOLUTE MONOCYTE (OHS): 0.81 K/UL (ref 0.3–1)
ABSOLUTE NEUTROPHIL COUNT (OHS): 11.2 K/UL (ref 1.8–7.7)
ALBUMIN SERPL BCP-MCNC: 3.2 G/DL (ref 3.5–5.2)
ALP SERPL-CCNC: 57 UNIT/L (ref 40–150)
ALT SERPL W/O P-5'-P-CCNC: 28 UNIT/L (ref 10–44)
ANION GAP (OHS): 8 MMOL/L (ref 8–16)
AST SERPL-CCNC: 15 UNIT/L (ref 11–45)
BACTERIA #/AREA URNS HPF: ABNORMAL /HPF
BASOPHILS # BLD AUTO: 0.04 K/UL
BASOPHILS NFR BLD AUTO: 0.3 %
BILIRUB SERPL-MCNC: 0.7 MG/DL (ref 0.1–1)
BILIRUB UR QL STRIP.AUTO: NEGATIVE
BUN SERPL-MCNC: 5 MG/DL (ref 6–20)
CALCIUM SERPL-MCNC: 9.1 MG/DL (ref 8.7–10.5)
CHLORIDE SERPL-SCNC: 106 MMOL/L (ref 95–110)
CLARITY UR: ABNORMAL
CO2 SERPL-SCNC: 20 MMOL/L (ref 23–29)
COLOR UR AUTO: YELLOW
CREAT SERPL-MCNC: 0.7 MG/DL (ref 0.5–1.4)
ERYTHROCYTE [DISTWIDTH] IN BLOOD BY AUTOMATED COUNT: 14.2 % (ref 11.5–14.5)
GFR SERPLBLD CREATININE-BSD FMLA CKD-EPI: >60 ML/MIN/1.73/M2
GLUCOSE SERPL-MCNC: 105 MG/DL (ref 70–110)
GLUCOSE UR QL STRIP: NEGATIVE
HCG INTACT+B SERPL-ACNC: NORMAL MIU/ML
HCT VFR BLD AUTO: 35.1 % (ref 37–48.5)
HGB BLD-MCNC: 12 GM/DL (ref 12–16)
HGB UR QL STRIP: ABNORMAL
HOLD SPECIMEN: NORMAL
HOLD SPECIMEN: NORMAL
HYALINE CASTS #/AREA URNS LPF: 0 /LPF (ref 0–1)
IMM GRANULOCYTES # BLD AUTO: 0.12 K/UL (ref 0–0.04)
IMM GRANULOCYTES NFR BLD AUTO: 0.9 % (ref 0–0.5)
KETONES UR QL STRIP: NEGATIVE
LEUKOCYTE ESTERASE UR QL STRIP: ABNORMAL
LYMPHOCYTES # BLD AUTO: 1.2 K/UL (ref 1–4.8)
MCH RBC QN AUTO: 27.3 PG (ref 27–31)
MCHC RBC AUTO-ENTMCNC: 34.2 G/DL (ref 32–36)
MCV RBC AUTO: 80 FL (ref 82–98)
MICROSCOPIC COMMENT: ABNORMAL
NITRITE UR QL STRIP: POSITIVE
NON-SQ EPI CELLS #/AREA URNS HPF: 2 /HPF
NUCLEATED RBC (/100WBC) (OHS): 0 /100 WBC
PH UR STRIP: 7 [PH]
PLATELET # BLD AUTO: 228 K/UL (ref 150–450)
PMV BLD AUTO: 9.2 FL (ref 9.2–12.9)
POTASSIUM SERPL-SCNC: 3.8 MMOL/L (ref 3.5–5.1)
PROT SERPL-MCNC: 7.6 GM/DL (ref 6–8.4)
PROT UR QL STRIP: ABNORMAL
RBC # BLD AUTO: 4.39 M/UL (ref 4–5.4)
RBC #/AREA URNS HPF: 15 /HPF (ref 0–4)
RELATIVE EOSINOPHIL (OHS): 0.8 %
RELATIVE LYMPHOCYTE (OHS): 8.9 % (ref 18–48)
RELATIVE MONOCYTE (OHS): 6 % (ref 4–15)
RELATIVE NEUTROPHIL (OHS): 83.1 % (ref 38–73)
RH BLD: NORMAL
SODIUM SERPL-SCNC: 134 MMOL/L (ref 136–145)
SP GR UR STRIP: 1.01
SQUAMOUS #/AREA URNS HPF: 3 /HPF
UROBILINOGEN UR STRIP-ACNC: NEGATIVE EU/DL
WBC # BLD AUTO: 13.48 K/UL (ref 3.9–12.7)
WBC #/AREA URNS HPF: >100 /HPF (ref 0–5)
WBC CLUMPS URNS QL MICRO: ABNORMAL
YEAST URNS QL MICRO: ABNORMAL /HPF

## 2025-05-23 PROCEDURE — 84702 CHORIONIC GONADOTROPIN TEST: CPT

## 2025-05-23 PROCEDURE — 81001 URINALYSIS AUTO W/SCOPE: CPT

## 2025-05-23 PROCEDURE — 87086 URINE CULTURE/COLONY COUNT: CPT

## 2025-05-23 PROCEDURE — 11000001 HC ACUTE MED/SURG PRIVATE ROOM

## 2025-05-23 PROCEDURE — 25000003 PHARM REV CODE 250: Performed by: STUDENT IN AN ORGANIZED HEALTH CARE EDUCATION/TRAINING PROGRAM

## 2025-05-23 PROCEDURE — 85025 COMPLETE CBC W/AUTO DIFF WBC: CPT

## 2025-05-23 PROCEDURE — 25000003 PHARM REV CODE 250

## 2025-05-23 PROCEDURE — G0378 HOSPITAL OBSERVATION PER HR: HCPCS

## 2025-05-23 PROCEDURE — 63600175 PHARM REV CODE 636 W HCPCS

## 2025-05-23 PROCEDURE — 86901 BLOOD TYPING SEROLOGIC RH(D): CPT

## 2025-05-23 PROCEDURE — 80053 COMPREHEN METABOLIC PANEL: CPT

## 2025-05-23 RX ORDER — CEFTRIAXONE 1 G/1
1 INJECTION, POWDER, FOR SOLUTION INTRAMUSCULAR; INTRAVENOUS
Status: COMPLETED | OUTPATIENT
Start: 2025-05-23 | End: 2025-05-23

## 2025-05-23 RX ORDER — ONDANSETRON HYDROCHLORIDE 2 MG/ML
8 INJECTION, SOLUTION INTRAVENOUS EVERY 8 HOURS PRN
Status: DISCONTINUED | OUTPATIENT
Start: 2025-05-23 | End: 2025-05-24 | Stop reason: HOSPADM

## 2025-05-23 RX ORDER — HYDROMORPHONE HYDROCHLORIDE 1 MG/ML
1 INJECTION, SOLUTION INTRAMUSCULAR; INTRAVENOUS; SUBCUTANEOUS EVERY 6 HOURS PRN
Status: DISCONTINUED | OUTPATIENT
Start: 2025-05-23 | End: 2025-05-24 | Stop reason: HOSPADM

## 2025-05-23 RX ORDER — SODIUM CHLORIDE 0.9 % (FLUSH) 0.9 %
10 SYRINGE (ML) INJECTION
Status: DISCONTINUED | OUTPATIENT
Start: 2025-05-23 | End: 2025-05-24 | Stop reason: HOSPADM

## 2025-05-23 RX ORDER — CEFTRIAXONE 1 G/1
1 INJECTION, POWDER, FOR SOLUTION INTRAMUSCULAR; INTRAVENOUS
Status: DISCONTINUED | OUTPATIENT
Start: 2025-05-24 | End: 2025-05-24 | Stop reason: HOSPADM

## 2025-05-23 RX ORDER — DESMOPRESSIN ACETATE 4 UG/ML
2 INJECTION, SOLUTION INTRAVENOUS; SUBCUTANEOUS 2 TIMES DAILY
Status: CANCELLED | OUTPATIENT
Start: 2025-05-23

## 2025-05-23 RX ORDER — ACETAMINOPHEN 325 MG/1
650 TABLET ORAL EVERY 4 HOURS PRN
Status: DISCONTINUED | OUTPATIENT
Start: 2025-05-23 | End: 2025-05-24 | Stop reason: HOSPADM

## 2025-05-23 RX ORDER — OXYCODONE HYDROCHLORIDE 5 MG/1
5 TABLET ORAL EVERY 4 HOURS PRN
Refills: 0 | Status: DISCONTINUED | OUTPATIENT
Start: 2025-05-23 | End: 2025-05-24 | Stop reason: HOSPADM

## 2025-05-23 RX ORDER — CALCIUM CARBONATE 200(500)MG
500 TABLET,CHEWABLE ORAL DAILY PRN
Status: DISCONTINUED | OUTPATIENT
Start: 2025-05-23 | End: 2025-05-24 | Stop reason: HOSPADM

## 2025-05-23 RX ORDER — ONDANSETRON 8 MG/1
8 TABLET, ORALLY DISINTEGRATING ORAL EVERY 8 HOURS PRN
Status: DISCONTINUED | OUTPATIENT
Start: 2025-05-23 | End: 2025-05-23

## 2025-05-23 RX ORDER — ACETAMINOPHEN 500 MG
1000 TABLET ORAL
Status: COMPLETED | OUTPATIENT
Start: 2025-05-23 | End: 2025-05-23

## 2025-05-23 RX ORDER — ONDANSETRON 4 MG/1
4 TABLET, ORALLY DISINTEGRATING ORAL EVERY 8 HOURS PRN
COMMUNITY

## 2025-05-23 RX ORDER — ONDANSETRON HYDROCHLORIDE 2 MG/ML
4 INJECTION, SOLUTION INTRAVENOUS
Status: COMPLETED | OUTPATIENT
Start: 2025-05-23 | End: 2025-05-23

## 2025-05-23 RX ADMIN — ACETAMINOPHEN 1000 MG: 500 TABLET ORAL at 09:05

## 2025-05-23 RX ADMIN — ACETAMINOPHEN 650 MG: 325 TABLET ORAL at 08:05

## 2025-05-23 RX ADMIN — CEFTRIAXONE SODIUM 1 G: 1 INJECTION, POWDER, FOR SOLUTION INTRAMUSCULAR; INTRAVENOUS at 10:05

## 2025-05-23 RX ADMIN — SODIUM CHLORIDE 1000 ML: 9 INJECTION, SOLUTION INTRAVENOUS at 12:05

## 2025-05-23 RX ADMIN — ACETAMINOPHEN 1000 MG: 500 TABLET ORAL at 02:05

## 2025-05-23 RX ADMIN — ONDANSETRON 4 MG: 2 INJECTION INTRAMUSCULAR; INTRAVENOUS at 12:05

## 2025-05-23 RX ADMIN — CALCIUM CARBONATE (ANTACID) CHEW TAB 500 MG 500 MG: 500 CHEW TAB at 06:05

## 2025-05-23 RX ADMIN — ONDANSETRON 8 MG: 8 TABLET, ORALLY DISINTEGRATING ORAL at 05:05

## 2025-05-23 NOTE — NURSING
Notified MD of pt wanting tums and IV Zofran. Order given to change from Zofran ODT to IV Zofran. Order given to add tums PRN. All orders were given via telephone by MD.

## 2025-05-23 NOTE — Clinical Note
Diagnosis: Pyelonephritis affecting pregnancy [5568912]   Reason for IP Medical Treatment  (Clinical interventions that can only be accomplished in the IP setting? ) :: pyelonephritis

## 2025-05-23 NOTE — PLAN OF CARE
Problem:  Fall Injury Risk  Goal: Absence of Fall, Infant Drop and Related Injury  Outcome: Progressing     Problem: Adult Inpatient Plan of Care  Goal: Plan of Care Review  Outcome: Progressing     Problem: Pain Acute  Goal: Optimal Pain Control and Function  Outcome: Progressing     Problem: UTI (Urinary Tract Infection)  Goal: Improved Infection Symptoms  Outcome: Progressing     Problem: Nausea and Vomiting  Goal: Nausea and Vomiting Relief  Outcome: Progressing

## 2025-05-23 NOTE — ED NOTES
PATIENT PRESSED CALL BELL COMPLAINING OF GENERALIZED BODY TREMORS, FEELING COLD AND HAD AN EPISODE OF VOMITING. PROVIDER NOTIFIED, AWAITING ANY ORDERS.

## 2025-05-23 NOTE — NURSING
Pt arrived via ED wheelchair to room 302. VSS, free of falls, and NADN. Pt oriented to room, call bell system, and call bell within reach. Handoff received from FIOR Hollins.

## 2025-05-23 NOTE — H&P
[place] - Med Surg (3rd Fl)  Obstetrics  History & Physical    Patient Name: Zheng Beverly  MRN: [id number]  Admission Date: 2025  Primary Care Provider: Micaela, Primary Doctor    Subjective:     Principal Problem:Pyelonephritis affecting pregnancy in second trimester    History of Present Illness:  Patient is a 30 yo  at 13w3d presenting to the emergency room with concerns for worsening UTI and increased back pain. She ws previously being treated for a UTI, which has not improved. She denies fever at this time.         OB History    Para Term  AB Living   3 2 2 0 0 2   SAB IAB Ectopic Multiple Live Births   0 0 0 0 2      # Outcome Date GA Lbr Johnny/2nd Weight Sex Type Anes PTL Lv   3 Current            2 Term 24 39w1d  3.629 kg (8 lb) M CS-LTranv EPI  GIOVANNI   1 Term 10/04/19 38w0d  3.515 kg (7 lb 12 oz) M CS-LTranv EPI  GIOVANNI     Past Medical History:   Diagnosis Date    Urinary incontinence      Past Surgical History:   Procedure Laterality Date     SECTION      boy: Alejandro Hampton     SECTION      boy: Gianluca       KELLY Medications   Medication Sig    inulin (FIBER GUMMIES) 2 gram Chew Take 2 each by mouth once daily.    prenatal 21/iron fu/folic acid (PRENATAL COMPLETE ORAL) Take 1 tablet by mouth once daily.    ondansetron (ZOFRAN-ODT) 4 MG TbDL Take 4 mg by mouth every 8 (eight) hours as needed.       Review of patient's allergies indicates:  No Known Allergies     Family History       Problem Relation (Age of Onset)    Diabetes Paternal Grandfather          Tobacco Use    Smoking status: Former     Types: Vaping with nicotine     Start date:      Passive exposure: Never    Smokeless tobacco: Never   Substance and Sexual Activity    Alcohol use: Not Currently    Drug use: Never    Sexual activity: Yes     Partners: Male     Birth control/protection: None     Review of Systems   Constitutional:  Negative for appetite change, chills, fever and unexpected weight change.    Eyes:  Negative for visual disturbance.   Respiratory:  Negative for shortness of breath.    Cardiovascular:  Negative for chest pain.   Gastrointestinal:  Negative for abdominal pain, constipation, diarrhea, nausea and vomiting.   Genitourinary:  Positive for dysuria, frequency and urgency. Negative for vaginal bleeding.   Musculoskeletal:  Positive for back pain.   Integumentary:  Negative for acne.   Neurological:  Negative for syncope and headaches.   Psychiatric/Behavioral:  Negative for depression. The patient is not nervous/anxious.       Objective:     Vital Signs (Most Recent):  Temp: (!) 100.4 °F (38 °C) (05/23/25 1527)  Pulse: 101 (05/23/25 1527)  Resp: 18 (05/23/25 1527)  BP: 114/65 (05/23/25 1527)  SpO2: 98 % (05/23/25 1527) Vital Signs (24h Range):  Temp:  [97.7 °F (36.5 °C)-100.4 °F (38 °C)] 100.4 °F (38 °C)  Pulse:  [] 101  Resp:  [18] 18  SpO2:  [98 %-100 %] 98 %  BP: (102-134)/(59-72) 114/65     Weight: 77.7 kg (171 lb 4.8 oz)  Body mass index is 34.6 kg/m².       Physical Exam:   Constitutional: She is oriented to person, place, and time. She appears well-developed and well-nourished. No distress.        Pulmonary/Chest: Effort normal. No respiratory distress.        Abdominal: There is right CVA tenderness and left CVA tenderness.                 Neurological: She is alert and oriented to person, place, and time.     Psychiatric: She has a normal mood and affect. Her behavior is normal. Judgment and thought content normal.          Significant Labs:  Lab Results   Component Value Date    GROUPTRH O POS 05/23/2025    HEPBSAG Non-Reactive 04/09/2025       I have personallly reviewed all pertinent lab results from the last 24 hours.  Recent Lab Results         05/23/25  1035   05/23/25  0922   05/23/25  0921        Albumin     3.2       ALP     57       ALT     28       Anion Gap     8       Appearance, UA   Cloudy         AST     15       Bacteria, UA   Moderate         Baso # 0.04            Basophil % 0.3           Bilirubin (UA)   Negative         BILIRUBIN TOTAL     0.7  Comment: For infants and newborns, interpretation of results should be based   on gestational age, weight and in agreement with clinical   observations.    Premature Infant recommended reference ranges:   0-24 hours:  <8.0 mg/dL   24-48 hours: <12.0 mg/dL   3-5 days:    <15.0 mg/dL   6-29 days:   <15.0 mg/dL       BUN     5       Calcium     9.1       Chloride     106       CO2     20       Color, UA   Yellow         Creatinine     0.7       eGFR     >60  Comment: Estimated GFR calculated using the CKD-EPI creatinine (2021) equation.       Eos # 0.11           Eos % 0.8           Glucose     105       Glucose, UA   Negative         Gran # (ANC) 11.20           Group & Rh     O POS       Beta HCG Quant     37,667.02  Comment: Quantitative HCG Reference Ranges:  Males........................<5.0 mIU/mL  Non-Pregnant Females.........<5.0 mIU/mL  Pregnancy:  Weeks post-LMP...............Range (mIU/mL)  1-10  weeks.....................202-231,000  11-15 weeks..................22,536-234,990  16-22 weeks...................8,007-50,064  23-40 weeks...................1,600-49,413    NOTE:  This assay is not FDA approved for tumor screening,   diagnosis, or monitoring.       Hematocrit 35.1           Hemoglobin 12.0           Extra Tube Hold for add-ons.  Comment: Auto resulted.      Hold for add-ons.  Comment: Auto resulted.            Hyaline Casts, UA   0         Immature Grans (Abs) 0.12  Comment: Mild elevation in immature granulocytes is non specific and can be seen in a variety of conditions including stress response, acute inflammation, trauma and pregnancy. Correlation with other laboratory and clinical findings is essential.           Immature Granulocytes 0.9           Ketones, UA   Negative         Leukocyte Esterase, UA   2+         Lymph # 1.20           Lymph % 8.9           MCH 27.3           MCHC 34.2           MCV 80            Microscopic Comment     Comment: Other formed elements not mentioned in the report are not present in the microscopic examination.         Mono # 0.81           Mono % 6.0           MPV 9.2           Neut % 83.1           NITRITE UA   Positive         NON-SQUAM EPITH   2         nRBC 0           Blood, UA   1+         pH, UA   7.0         Platelet Count 228           Potassium     3.8       PROTEIN TOTAL     7.6       Protein, UA   2+  Comment: Recommend a 24 hour urine protein or a urine protein/creatinine ratio if globulin induced proteinuria is clinically suspected.         RBC 4.39           RBC, UA   15         RDW 14.2           Sodium     134       Spec Grav UA   1.015         Squam Epithel, UA   3         Urobilinogen, UA   Negative         WBC, UA   >100         WBC Clumps, UA   Moderate         WBC 13.48           Yeast, UA   None               Assessment/Plan:     29 y.o. female  at 13w3d for:    * Pyelonephritis affecting pregnancy in second trimester  - rocephin 1g every 24 hours  - repeat CBC in am        Angelika Murrieta MD  Obstetrics  [place] - Med Surg (3rd Fl)         Double O-Z Flap Text: The defect edges were debeveled with a #15 scalpel blade.  Given the location of the defect, shape of the defect and the proximity to free margins a Double O-Z flap was deemed most appropriate.  Using a sterile surgical marker, an appropriate transposition flap was drawn incorporating the defect and placing the expected incisions within the relaxed skin tension lines where possible. The area thus outlined was incised deep to adipose tissue with a #15 scalpel blade.  The skin margins were undermined to an appropriate distance in all directions utilizing iris scissors.

## 2025-05-23 NOTE — HOSPITAL COURSE
Patient presented and UTI symptoms and back pain concerning for pyelonephritis. She was admitted for IV antibiotics.   HD#1: patient reports that pain is better, but she is having some dizziness; continue to monitor. Administer Rocephin 1g; consider discharge later today.

## 2025-05-23 NOTE — SUBJECTIVE & OBJECTIVE
OB History    Para Term  AB Living   3 2 2 0 0 2   SAB IAB Ectopic Multiple Live Births   0 0 0 0 2      # Outcome Date GA Lbr Johnny/2nd Weight Sex Type Anes PTL Lv   3 Current            2 Term 24 39w1d  3.629 kg (8 lb) M CS-LTranv EPI  GIOVANNI   1 Term 10/04/19 38w0d  3.515 kg (7 lb 12 oz) M CS-LTranv EPI  GIOVANNI     Past Medical History:   Diagnosis Date    Urinary incontinence      Past Surgical History:   Procedure Laterality Date     SECTION      boy: Alejandro Hampton     SECTION      boy: Gianluca MENDOZA Medications   Medication Sig    inulin (FIBER GUMMIES) 2 gram Chew Take 2 each by mouth once daily.    prenatal 21/iron fu/folic acid (PRENATAL COMPLETE ORAL) Take 1 tablet by mouth once daily.    ondansetron (ZOFRAN-ODT) 4 MG TbDL Take 4 mg by mouth every 8 (eight) hours as needed.       Review of patient's allergies indicates:  No Known Allergies     Family History       Problem Relation (Age of Onset)    Diabetes Paternal Grandfather          Tobacco Use    Smoking status: Former     Types: Vaping with nicotine     Start date:      Passive exposure: Never    Smokeless tobacco: Never   Substance and Sexual Activity    Alcohol use: Not Currently    Drug use: Never    Sexual activity: Yes     Partners: Male     Birth control/protection: None     Review of Systems   Constitutional:  Negative for appetite change, chills, fever and unexpected weight change.   Eyes:  Negative for visual disturbance.   Respiratory:  Negative for shortness of breath.    Cardiovascular:  Negative for chest pain.   Gastrointestinal:  Negative for abdominal pain, constipation, diarrhea, nausea and vomiting.   Genitourinary:  Positive for dysuria, frequency and urgency. Negative for vaginal bleeding.   Musculoskeletal:  Positive for back pain.   Integumentary:  Negative for acne.   Neurological:  Negative for syncope and headaches.   Psychiatric/Behavioral:  Negative for depression. The patient is not  nervous/anxious.       Objective:     Vital Signs (Most Recent):  Temp: (!) 100.4 °F (38 °C) (05/23/25 1527)  Pulse: 101 (05/23/25 1527)  Resp: 18 (05/23/25 1527)  BP: 114/65 (05/23/25 1527)  SpO2: 98 % (05/23/25 1527) Vital Signs (24h Range):  Temp:  [97.7 °F (36.5 °C)-100.4 °F (38 °C)] 100.4 °F (38 °C)  Pulse:  [] 101  Resp:  [18] 18  SpO2:  [98 %-100 %] 98 %  BP: (102-134)/(59-72) 114/65     Weight: 77.7 kg (171 lb 4.8 oz)  Body mass index is 34.6 kg/m².       Physical Exam:   Constitutional: She is oriented to person, place, and time. She appears well-developed and well-nourished. No distress.        Pulmonary/Chest: Effort normal. No respiratory distress.        Abdominal: There is right CVA tenderness and left CVA tenderness.                 Neurological: She is alert and oriented to person, place, and time.     Psychiatric: She has a normal mood and affect. Her behavior is normal. Judgment and thought content normal.          Significant Labs:  Lab Results   Component Value Date    GROUPTRH O POS 05/23/2025    HEPBSAG Non-Reactive 04/09/2025       I have personallly reviewed all pertinent lab results from the last 24 hours.  Recent Lab Results         05/23/25  1035   05/23/25  0922   05/23/25  0921        Albumin     3.2       ALP     57       ALT     28       Anion Gap     8       Appearance, UA   Cloudy         AST     15       Bacteria, UA   Moderate         Baso # 0.04           Basophil % 0.3           Bilirubin (UA)   Negative         BILIRUBIN TOTAL     0.7  Comment: For infants and newborns, interpretation of results should be based   on gestational age, weight and in agreement with clinical   observations.    Premature Infant recommended reference ranges:   0-24 hours:  <8.0 mg/dL   24-48 hours: <12.0 mg/dL   3-5 days:    <15.0 mg/dL   6-29 days:   <15.0 mg/dL       BUN     5       Calcium     9.1       Chloride     106       CO2     20       Color, UA   Yellow         Creatinine     0.7        eGFR     >60  Comment: Estimated GFR calculated using the CKD-EPI creatinine (2021) equation.       Eos # 0.11           Eos % 0.8           Glucose     105       Glucose, UA   Negative         Gran # (ANC) 11.20           Group & Rh     O POS       Beta HCG Quant     37,667.02  Comment: Quantitative HCG Reference Ranges:  Males........................<5.0 mIU/mL  Non-Pregnant Females.........<5.0 mIU/mL  Pregnancy:  Weeks post-LMP...............Range (mIU/mL)  1-10  weeks.....................202-231,000  11-15 weeks..................22,536-234,990  16-22 weeks...................8,007-50,064  23-40 weeks...................1,600-49,413    NOTE:  This assay is not FDA approved for tumor screening,   diagnosis, or monitoring.       Hematocrit 35.1           Hemoglobin 12.0           Extra Tube Hold for add-ons.  Comment: Auto resulted.      Hold for add-ons.  Comment: Auto resulted.            Hyaline Casts, UA   0         Immature Grans (Abs) 0.12  Comment: Mild elevation in immature granulocytes is non specific and can be seen in a variety of conditions including stress response, acute inflammation, trauma and pregnancy. Correlation with other laboratory and clinical findings is essential.           Immature Granulocytes 0.9           Ketones, UA   Negative         Leukocyte Esterase, UA   2+         Lymph # 1.20           Lymph % 8.9           MCH 27.3           MCHC 34.2           MCV 80           Microscopic Comment     Comment: Other formed elements not mentioned in the report are not present in the microscopic examination.         Mono # 0.81           Mono % 6.0           MPV 9.2           Neut % 83.1           NITRITE UA   Positive         NON-SQUAM EPITH   2         nRBC 0           Blood, UA   1+         pH, UA   7.0         Platelet Count 228           Potassium     3.8       PROTEIN TOTAL     7.6       Protein, UA   2+  Comment: Recommend a 24 hour urine protein or a urine protein/creatinine ratio if  globulin induced proteinuria is clinically suspected.         RBC 4.39           RBC, UA   15         RDW 14.2           Sodium     134       Spec Grav UA   1.015         Squam Epithel, UA   3         Urobilinogen, UA   Negative         WBC, UA   >100         WBC Clumps, UA   Moderate         WBC 13.48           Yeast, UA   None

## 2025-05-23 NOTE — ED PROVIDER NOTES
Encounter Date: 2025    Source of History:   Patient and medical record, without language barrier or      Chief complaint:  Back Pain (Patient arrived to ED with c/o lower backpain for past 2 days. States she just completed antibiotics for UTI on . Also with c/o yeast infection after antibiotic use. Monistat unsuccessful )    HPI:  Zheng Beverly is a 29 y.o. female with current 13 week 3 day pregnancy, frequent UTIs presenting with chief complaint of back pain and dysuria.  Patient recently finished antibiotics for a UTI 2 days ago on .  She states that shortly afterwards her back pain developed.  It is bilateral in the lower lumbar area.  She also states she got a yeast infection after antibiotics use and tried Monistat at home which was unsuccessful.  No fevers.  Endorses chills.  Nausea and vomiting at baseline    This is the extent to the patients complaints today here in the emergency department.    ROS: A review of systems was conducted with pertinent positive and negative findings documented in HPI with all other systems reviewed and negative.  ROS    Review of patient's allergies indicates:  No Known Allergies    PMH:  As per HPI and below:  Past Medical History:   Diagnosis Date    Urinary incontinence      Past Surgical History:   Procedure Laterality Date     SECTION      boy: Alejandro Hampton     SECTION      boy: Gianluca     Social History     Socioeconomic History    Marital status:    Tobacco Use    Smoking status: Former     Types: Vaping with nicotine     Start date:      Passive exposure: Never    Smokeless tobacco: Never   Substance and Sexual Activity    Alcohol use: Not Currently    Drug use: Never    Sexual activity: Yes     Partners: Male     Birth control/protection: None     Social Drivers of Health     Food Insecurity: No Food Insecurity (2024)    Received from [facility]    Hunger Vital Sign     Worried About Running Out of Food in the  "Last Year: Never true     Ran Out of Food in the Last Year: Never true   Transportation Needs: No Transportation Needs (2/1/2024)    Received from [facility]    PRAPARE - Transportation     Lack of Transportation (Medical): No     Lack of Transportation (Non-Medical): No   Physical Activity: Patient Unable To Answer (2/1/2024)    Received from [facility]    Exercise Vital Sign     Days of Exercise per Week: Patient unable to answer     Minutes of Exercise per Session: Patient unable to answer   Stress: No Stress Concern Present (2/1/2024)    Received from [facility]    [employer] - Occupational Stress Questionnaire     Feeling of Stress : Not at all   Housing Stability: Low Risk  (2/1/2024)    Received from [facility]    Housing Stability Vital Sign     Unable to Pay for Housing in the Last Year: No     Number of Times Moved in the Last Year: 0     Homeless in the Last Year: No     Vitals:    /72   Pulse 90   Temp 97.8 °F (36.6 °C) (Oral)   Resp 18   Ht 4' 11" (1.499 m)   Wt 73.6 kg (162 lb 4.1 oz)   LMP 02/07/2025 (Exact Date)   SpO2 100%   Breastfeeding No   BMI 32.77 kg/m²     Physical Exam  Vitals and nursing note reviewed.   Constitutional:       General: She is not in acute distress.     Appearance: Normal appearance. She is not toxic-appearing or diaphoretic.   HENT:      Head: Normocephalic and atraumatic.      Right Ear: External ear normal.      Left Ear: External ear normal.   Eyes:      General: No scleral icterus.     Conjunctiva/sclera: Conjunctivae normal.   Cardiovascular:      Rate and Rhythm: Normal rate and regular rhythm.   Pulmonary:      Effort: Pulmonary effort is normal. No respiratory distress.      Breath sounds: No stridor.   Abdominal:      General: Abdomen is flat. There is no distension.      Tenderness: There is right CVA tenderness and left CVA tenderness.   Musculoskeletal:         General: No swelling.      " Cervical back: Normal range of motion and neck supple.   Skin:     General: Skin is dry.      Coloration: Skin is not jaundiced.   Neurological:      Mental Status: She is alert. Mental status is at baseline.   Psychiatric:         Mood and Affect: Mood normal.         Behavior: Behavior normal.       Procedures    Laboratory Studies:  Labs that have been ordered have been independently reviewed and interpreted by myself.  Labs Reviewed   URINALYSIS, REFLEX TO URINE CULTURE - Abnormal       Result Value    Color, UA Yellow      Appearance, UA Cloudy (*)     pH, UA 7.0      Spec Grav UA 1.015      Protein, UA 2+ (*)     Glucose, UA Negative      Ketones, UA Negative      Bilirubin, UA Negative      Blood, UA 1+ (*)     Nitrites, UA Positive (*)     Urobilinogen, UA Negative      Leukocyte Esterase, UA 2+ (*)    COMPREHENSIVE METABOLIC PANEL - Abnormal    Sodium 134 (*)     Potassium 3.8      Chloride 106      CO2 20 (*)     Glucose 105      BUN 5 (*)     Creatinine 0.7      Calcium 9.1      Protein Total 7.6      Albumin 3.2 (*)     Bilirubin Total 0.7      ALP 57      AST 15      ALT 28      Anion Gap 8      eGFR >60     URINALYSIS MICROSCOPIC - Abnormal    RBC, UA 15 (*)     WBC, UA >100 (*)     WBC Clumps, UA Moderate (*)     Bacteria, UA Moderate (*)     Yeast, UA None      Squamous Epithelial Cells, UA 3      Non-Squamous Epithelial Cells 2 (*)     Hyaline Casts, UA 0      Microscopic Comment       CBC WITH DIFFERENTIAL - Abnormal    WBC 13.48 (*)     RBC 4.39      HGB 12.0      HCT 35.1 (*)     MCV 80 (*)     MCH 27.3      MCHC 34.2      RDW 14.2      Platelet Count 228      MPV 9.2      Nucleated RBC 0      Neut % 83.1 (*)     Lymph % 8.9 (*)     Mono % 6.0      Eos % 0.8      Basophil % 0.3      Imm Grans % 0.9 (*)     Neut # 11.20 (*)     Lymph # 1.20      Mono # 0.81      Eos # 0.11      Baso # 0.04      Imm Grans # 0.12 (*)    CULTURE, URINE   HCG, QUANTITATIVE    Beta HCG Quant 37,667.02     GREY TOP  URINE HOLD    Extra Tube Hold for add-ons.     CBC W/ AUTO DIFFERENTIAL    Narrative:     The following orders were created for panel order CBC auto differential.  Procedure                               Abnormality         Status                     ---------                               -----------         ------                     CBC with Differential[[id number]]       Abnormal            Final result                 Please view results for these tests on the individual orders.   EXTRA TUBES    Narrative:     The following orders were created for panel order EXTRA TUBES.  Procedure                               Abnormality         Status                     ---------                               -----------         ------                     Light Green Top Hold[[id number]]                            In process                   Please view results for these tests on the individual orders.   LIGHT GREEN TOP HOLD   GROUP & RH    Group & Rh O POS       Imaging Results    None       I decided to obtain the patient's medical records.  Summary of Medical Records:    Medications   sodium chloride 0.9% flush 10 mL (has no administration in time range)   ondansetron disintegrating tablet 8 mg (has no administration in time range)   acetaminophen tablet 650 mg (has no administration in time range)   oxyCODONE immediate release tablet 5 mg (has no administration in time range)   cefTRIAXone injection 1 g (has no administration in time range)   acetaminophen tablet 1,000 mg (1,000 mg Oral Given 5/23/25 0922)   cefTRIAXone injection 1 g (1 g Intravenous Given 5/23/25 1051)     MDM:    29 y.o. female with dysuria and back pain    Differential Dx:  Differential includes but is not limited to pyelonephritis, cystitis, mechanical low back pain    ED Management:  Infectious workup started for acute presentation of an emergent condition.  Tylenol for symptomatic treatment.  Urinalysis with evidence of UTI concerning for  pyelonephritis.  Patient also found to have leukocytosis with left shift.  Given ceftriaxone for likely upper urinary tract infection.  Discussed patient's case with OB on-call who in the setting of CVA tenderness, leukocytosis, significant UTI that has failed outpatient antibiotics recommended observation.  Patient admitted to OB for further workup and treatment of her pyelonephritis    Discussed with:  OB regarding admission      Medical Decision Making  Amount and/or Complexity of Data Reviewed  Labs: ordered. Decision-making details documented in ED Course.    Risk  OTC drugs.  Prescription drug management.  Decision regarding hospitalization.         ED Course as of 05/23/25 1113   Fri May 23, 2025   1112 WBC, UA(!): >100 [AW]   1113 WBC(!): 13.48  Leukocytosis [AW]      ED Course User Index  [AW] Sixto Espinoza MD     Diagnostic Impression:    Final diagnoses:  [O23.00] Pyelonephritis affecting pregnancy     ED Disposition Condition    Admit                    Sixto Espinoza MD  05/23/25 1114

## 2025-05-23 NOTE — HPI
Patient is a 30 yo  at 13w3d presenting to the emergency room with concerns for worsening UTI and increased back pain. She ws previously being treated for a UTI, which has not improved. She denies fever at this time.

## 2025-05-24 VITALS
OXYGEN SATURATION: 98 % | RESPIRATION RATE: 18 BRPM | DIASTOLIC BLOOD PRESSURE: 60 MMHG | HEART RATE: 87 BPM | BODY MASS INDEX: 34.53 KG/M2 | SYSTOLIC BLOOD PRESSURE: 99 MMHG | WEIGHT: 171.31 LBS | HEIGHT: 59 IN | TEMPERATURE: 98 F

## 2025-05-24 LAB
ABSOLUTE EOSINOPHIL (OHS): 0.18 K/UL
ABSOLUTE MONOCYTE (OHS): 0.92 K/UL (ref 0.3–1)
ABSOLUTE NEUTROPHIL COUNT (OHS): 9.69 K/UL (ref 1.8–7.7)
BASOPHILS # BLD AUTO: 0.03 K/UL
BASOPHILS NFR BLD AUTO: 0.2 %
ERYTHROCYTE [DISTWIDTH] IN BLOOD BY AUTOMATED COUNT: 14.6 % (ref 11.5–14.5)
HCT VFR BLD AUTO: 30.6 % (ref 37–48.5)
HGB BLD-MCNC: 10.2 GM/DL (ref 12–16)
IMM GRANULOCYTES # BLD AUTO: 0.11 K/UL (ref 0–0.04)
IMM GRANULOCYTES NFR BLD AUTO: 0.9 % (ref 0–0.5)
LYMPHOCYTES # BLD AUTO: 1.48 K/UL (ref 1–4.8)
MCH RBC QN AUTO: 26.9 PG (ref 27–31)
MCHC RBC AUTO-ENTMCNC: 33.3 G/DL (ref 32–36)
MCV RBC AUTO: 81 FL (ref 82–98)
NUCLEATED RBC (/100WBC) (OHS): 0 /100 WBC
PLATELET # BLD AUTO: 199 K/UL (ref 150–450)
PMV BLD AUTO: 9.1 FL (ref 9.2–12.9)
RBC # BLD AUTO: 3.79 M/UL (ref 4–5.4)
RELATIVE EOSINOPHIL (OHS): 1.5 %
RELATIVE LYMPHOCYTE (OHS): 11.9 % (ref 18–48)
RELATIVE MONOCYTE (OHS): 7.4 % (ref 4–15)
RELATIVE NEUTROPHIL (OHS): 78.1 % (ref 38–73)
WBC # BLD AUTO: 12.41 K/UL (ref 3.9–12.7)

## 2025-05-24 PROCEDURE — 63600175 PHARM REV CODE 636 W HCPCS: Performed by: STUDENT IN AN ORGANIZED HEALTH CARE EDUCATION/TRAINING PROGRAM

## 2025-05-24 PROCEDURE — 99238 HOSP IP/OBS DSCHRG MGMT 30/<: CPT | Mod: ,,, | Performed by: STUDENT IN AN ORGANIZED HEALTH CARE EDUCATION/TRAINING PROGRAM

## 2025-05-24 PROCEDURE — 36415 COLL VENOUS BLD VENIPUNCTURE: CPT | Performed by: STUDENT IN AN ORGANIZED HEALTH CARE EDUCATION/TRAINING PROGRAM

## 2025-05-24 PROCEDURE — 85025 COMPLETE CBC W/AUTO DIFF WBC: CPT | Performed by: STUDENT IN AN ORGANIZED HEALTH CARE EDUCATION/TRAINING PROGRAM

## 2025-05-24 PROCEDURE — G0378 HOSPITAL OBSERVATION PER HR: HCPCS

## 2025-05-24 PROCEDURE — 25000003 PHARM REV CODE 250: Performed by: STUDENT IN AN ORGANIZED HEALTH CARE EDUCATION/TRAINING PROGRAM

## 2025-05-24 RX ORDER — NITROFURANTOIN 25; 75 MG/1; MG/1
100 CAPSULE ORAL DAILY
Qty: 30 CAPSULE | Refills: 6 | Status: SHIPPED | OUTPATIENT
Start: 2025-05-24 | End: 2025-06-23

## 2025-05-24 RX ORDER — NITROFURANTOIN 25; 75 MG/1; MG/1
100 CAPSULE ORAL 2 TIMES DAILY
Qty: 14 CAPSULE | Refills: 0 | Status: SHIPPED | OUTPATIENT
Start: 2025-05-24 | End: 2025-05-31

## 2025-05-24 RX ORDER — FLUCONAZOLE 150 MG/1
150 TABLET ORAL ONCE
Qty: 1 TABLET | Refills: 1 | Status: SHIPPED | OUTPATIENT
Start: 2025-05-24 | End: 2025-05-24

## 2025-05-24 RX ORDER — SODIUM CHLORIDE, SODIUM LACTATE, POTASSIUM CHLORIDE, CALCIUM CHLORIDE 600; 310; 30; 20 MG/100ML; MG/100ML; MG/100ML; MG/100ML
INJECTION, SOLUTION INTRAVENOUS CONTINUOUS
Status: DISCONTINUED | OUTPATIENT
Start: 2025-05-24 | End: 2025-05-24 | Stop reason: HOSPADM

## 2025-05-24 RX ADMIN — CEFTRIAXONE SODIUM 1 G: 1 INJECTION, POWDER, FOR SOLUTION INTRAMUSCULAR; INTRAVENOUS at 10:05

## 2025-05-24 RX ADMIN — ACETAMINOPHEN 650 MG: 325 TABLET ORAL at 01:05

## 2025-05-24 RX ADMIN — ONDANSETRON 8 MG: 2 INJECTION INTRAMUSCULAR; INTRAVENOUS at 09:05

## 2025-05-24 RX ADMIN — ONDANSETRON 8 MG: 2 INJECTION INTRAMUSCULAR; INTRAVENOUS at 01:05

## 2025-05-24 RX ADMIN — SODIUM CHLORIDE, POTASSIUM CHLORIDE, SODIUM LACTATE AND CALCIUM CHLORIDE: 600; 310; 30; 20 INJECTION, SOLUTION INTRAVENOUS at 05:05

## 2025-05-24 NOTE — PROGRESS NOTES
[place] - [facility] ([facility])  Obstetrics  Antepartum Progress Note    Patient Name: Zheng Beverly  MRN: [id number]  Admission Date: 2025  Hospital Length of Stay: 1 days  Attending Physician: Angelika Murrieta*  Primary Care Provider: Micaela, Primary Doctor    Subjective:     Principal Problem:Pyelonephritis affecting pregnancy in second trimester    HPI:  Patient is a 30 yo  at 13w3d presenting to the emergency room with concerns for worsening UTI and increased back pain. She ws previously being treated for a UTI, which has not improved. She denies fever at this time.     Hospital Course:  Patient presented and UTI symptoms and back pain concerning for pyelonephritis. She was admitted for IV antibiotics.   HD#1: patient reports that pain is better, but she is having some dizziness; continue to monitor. Administer Rocephin 1g; consider discharge later today.    Obstetric HPI:  Patient reports None contractions, pain has improved. She is having some dizziness but is unsure if it is because she is tired. Nausea is still present but improves with medication.     Objective:     Vital Signs (Most Recent):  Temp: 97.9 °F (36.6 °C) (25)  Pulse: 79 (25)  Resp: 18 (25)  BP: (!) 87/57 (25)  SpO2: 95 % (25) Vital Signs (24h Range):  Temp:  [97 °F (36.1 °C)-100.4 °F (38 °C)] 97.9 °F (36.6 °C)  Pulse:  [] 79  Resp:  [16-18] 18  SpO2:  [95 %-100 %] 95 %  BP: ()/(52-72) 87/57     Weight: 77.7 kg (171 lb 4.8 oz)  Body mass index is 34.6 kg/m².      Intake/Output Summary (Last 24 hours) at 2025 0623  Last data filed at 2025 0606  Gross per 24 hour   Intake 1620 ml   Output --   Net 1620 ml         Significant Labs:  Recent Lab Results         25  0427   25  1035   25  0922   25  0921        Albumin       3.2       ALP       57       ALT       28       Anion Gap       8       Appearance, UA     Cloudy         AST       15        Bacteria, UA     Moderate         Baso # 0.03   0.04           Basophil % 0.2   0.3           Bilirubin (UA)     Negative         BILIRUBIN TOTAL       0.7  Comment: For infants and newborns, interpretation of results should be based   on gestational age, weight and in agreement with clinical   observations.    Premature Infant recommended reference ranges:   0-24 hours:  <8.0 mg/dL   24-48 hours: <12.0 mg/dL   3-5 days:    <15.0 mg/dL   6-29 days:   <15.0 mg/dL       BUN       5       Calcium       9.1       Chloride       106       CO2       20       Color, UA     Yellow         Creatinine       0.7       eGFR       >60  Comment: Estimated GFR calculated using the CKD-EPI creatinine (2021) equation.       Eos # 0.18   0.11           Eos % 1.5   0.8           Glucose       105       Glucose, UA     Negative         Gran # (ANC) 9.69   11.20           Group & Rh       O POS       Beta HCG Quant       37,667.02  Comment: Quantitative HCG Reference Ranges:  Males........................<5.0 mIU/mL  Non-Pregnant Females.........<5.0 mIU/mL  Pregnancy:  Weeks post-LMP...............Range (mIU/mL)  1-10  weeks.....................202-231,000  11-15 weeks..................22,536-234,990  16-22 weeks...................8,007-50,064  23-40 weeks...................1,600-49,413    NOTE:  This assay is not FDA approved for tumor screening,   diagnosis, or monitoring.       Hematocrit 30.6   35.1           Hemoglobin 10.2   12.0           Extra Tube   Hold for add-ons.  Comment: Auto resulted.      Hold for add-ons.  Comment: Auto resulted.            Hyaline Casts, UA     0         Immature Grans (Abs) 0.11  Comment: Mild elevation in immature granulocytes is non specific and can be seen in a variety of conditions including stress response, acute inflammation, trauma and pregnancy. Correlation with other laboratory and clinical findings is essential.   0.12  Comment: Mild elevation in immature granulocytes is non specific  and can be seen in a variety of conditions including stress response, acute inflammation, trauma and pregnancy. Correlation with other laboratory and clinical findings is essential.           Immature Granulocytes 0.9   0.9           Ketones, UA     Negative         Leukocyte Esterase, UA     2+         Lymph # 1.48   1.20           Lymph % 11.9   8.9           MCH 26.9   27.3           MCHC 33.3   34.2           MCV 81   80           Microscopic Comment       Comment: Other formed elements not mentioned in the report are not present in the microscopic examination.         Mono # 0.92   0.81           Mono % 7.4   6.0           MPV 9.1   9.2           Neut % 78.1   83.1           NITRITE UA     Positive         NON-SQUAM EPITH     2         nRBC 0   0           Blood, UA     1+         pH, UA     7.0         Platelet Count 199   228           Potassium       3.8       PROTEIN TOTAL       7.6       Protein, UA     2+  Comment: Recommend a 24 hour urine protein or a urine protein/creatinine ratio if globulin induced proteinuria is clinically suspected.         RBC 3.79   4.39           RBC, UA     15         RDW 14.6   14.2           Sodium       134       Spec Grav UA     1.015         Squam Epithel, UA     3         Urobilinogen, UA     Negative         WBC, UA     >100         WBC Clumps, UA     Moderate         WBC 12.41   13.48           Yeast, UA     None                 Physical Exam:   Constitutional: She is oriented to person, place, and time. She appears well-developed and well-nourished. No distress.    HENT:   Head: Atraumatic.      Cardiovascular:  Normal rate.             Pulmonary/Chest: Effort normal. No respiratory distress.        Abdominal: Soft. There is no right CVA tenderness and no left CVA tenderness.                 Neurological: She is alert and oriented to person, place, and time.     Psychiatric: She has a normal mood and affect. Her behavior is normal. Judgment and thought content normal.        Review of Systems   Constitutional:  Negative for appetite change, chills and fever.   Eyes:  Negative for visual disturbance.   Respiratory:  Negative for shortness of breath.    Cardiovascular:  Negative for chest pain.   Gastrointestinal:  Positive for nausea. Negative for abdominal pain, constipation, diarrhea and vomiting.   Genitourinary:  Negative for dysmenorrhea, dyspareunia, pelvic pain, vaginal bleeding, vaginal discharge, postcoital bleeding and vaginal odor.   Musculoskeletal:  Negative for back pain.   Neurological:  Negative for syncope and headaches.   Psychiatric/Behavioral:  Negative for depression. The patient is not nervous/anxious.      Assessment/Plan:     29 y.o. female  at 13w4d for:    * Pyelonephritis affecting pregnancy in second trimester  - rocephin 1g every 24 hours  - continuous IVF           Angelika Murrieta MD  Obstetrics  [place] - Med Surg (3rd Fl)

## 2025-05-24 NOTE — DISCHARGE SUMMARY
[place] - [facility] (3rd Fl)  Obstetrics  Discharge Summary      Patient Name: Zheng Beverly  MRN: [id number]  Admission Date: 2025  Hospital Length of Stay: 1 days  Discharge Date and Time: 2025 6:27 AM  Attending Physician: Angelika Murrieta*   Discharging Provider: Angelika Murrieta MD   Primary Care Provider: Micaela, Primary Doctor    HPI: Patient is a 30 yo  at 13w3d presenting to the emergency room with concerns for worsening UTI and increased back pain. She ws previously being treated for a UTI, which has not improved. She denies fever at this time.       * No surgery found *     Hospital Course:   Patient presented and UTI symptoms and back pain concerning for pyelonephritis. She was admitted for IV antibiotics.   HD#1: patient reports that pain is better, but she is having some dizziness; continue to monitor. Administer Rocephin 1g; consider discharge later today.         Final Active Diagnoses:    Diagnosis Date Noted POA    PRINCIPAL PROBLEM:  Pyelonephritis affecting pregnancy in second trimester [O23.02] 2025 Yes      Problems Resolved During this Admission:    Diagnosis Date Noted Date Resolved POA    Elevated liver enzymes [R74.8] 2024 Yes          Immunizations       None            This patient has no babies on file.  Pending Diagnostic Studies:       None            Discharged Condition: good    Disposition: Home or Self Care    Follow Up:   Follow-up Information       Mel Hammond MD Follow up in 2 week(s).    Specialty: Obstetrics and Gynecology  Contact information:  [address] [place] [id number] [phone]                           Patient Instructions:   No discharge procedures on file.  Medications:  Current Discharge Medication List        START taking these medications    Details   fluconazole (DIFLUCAN) 150 MG Tab Take 1 tablet (150 mg total) by mouth once. for 1 dose  Qty: 1 tablet, Refills: 1      !! nitrofurantoin,  macrocrystal-monohydrate, (MACROBID) 100 MG capsule Take 1 capsule (100 mg total) by mouth 2 (two) times daily. for 7 days  Qty: 14 capsule, Refills: 0      !! nitrofurantoin, macrocrystal-monohydrate, (MACROBID) 100 MG capsule Take 1 capsule (100 mg total) by mouth Daily.  Qty: 30 capsule, Refills: 6       !! - Potential duplicate medications found. Please discuss with provider.        CONTINUE these medications which have NOT CHANGED    Details   inulin (FIBER GUMMIES) 2 gram Chew Take 2 each by mouth once daily.      prenatal 21/iron fu/folic acid (PRENATAL COMPLETE ORAL) Take 1 tablet by mouth once daily.      ondansetron (ZOFRAN-ODT) 4 MG TbDL Take 4 mg by mouth every 8 (eight) hours as needed.             Angelika Murrieta MD  Obstetrics  [facility] (3rd Fl)

## 2025-05-24 NOTE — SUBJECTIVE & OBJECTIVE
Obstetric HPI:  Patient reports None contractions, pain has improved. She is having some dizziness but is unsure if it is because she is tired. Nausea is still present but improves with medication.     Objective:     Vital Signs (Most Recent):  Temp: 97.9 °F (36.6 °C) (05/24/25 0334)  Pulse: 79 (05/24/25 0334)  Resp: 18 (05/24/25 0334)  BP: (!) 87/57 (05/24/25 0334)  SpO2: 95 % (05/24/25 0334) Vital Signs (24h Range):  Temp:  [97 °F (36.1 °C)-100.4 °F (38 °C)] 97.9 °F (36.6 °C)  Pulse:  [] 79  Resp:  [16-18] 18  SpO2:  [95 %-100 %] 95 %  BP: ()/(52-72) 87/57     Weight: 77.7 kg (171 lb 4.8 oz)  Body mass index is 34.6 kg/m².      Intake/Output Summary (Last 24 hours) at 5/24/2025 0623  Last data filed at 5/24/2025 0606  Gross per 24 hour   Intake 1620 ml   Output --   Net 1620 ml         Significant Labs:  Recent Lab Results         05/24/25  0427   05/23/25  1035   05/23/25  0922   05/23/25  0921        Albumin       3.2       ALP       57       ALT       28       Anion Gap       8       Appearance, UA     Cloudy         AST       15       Bacteria, UA     Moderate         Baso # 0.03   0.04           Basophil % 0.2   0.3           Bilirubin (UA)     Negative         BILIRUBIN TOTAL       0.7  Comment: For infants and newborns, interpretation of results should be based   on gestational age, weight and in agreement with clinical   observations.    Premature Infant recommended reference ranges:   0-24 hours:  <8.0 mg/dL   24-48 hours: <12.0 mg/dL   3-5 days:    <15.0 mg/dL   6-29 days:   <15.0 mg/dL       BUN       5       Calcium       9.1       Chloride       106       CO2       20       Color, UA     Yellow         Creatinine       0.7       eGFR       >60  Comment: Estimated GFR calculated using the CKD-EPI creatinine (2021) equation.       Eos # 0.18   0.11           Eos % 1.5   0.8           Glucose       105       Glucose, UA     Negative         Gran # (ANC) 9.69   11.20           Group & Rh        O POS       Beta HCG Quant       37,667.02  Comment: Quantitative HCG Reference Ranges:  Males........................<5.0 mIU/mL  Non-Pregnant Females.........<5.0 mIU/mL  Pregnancy:  Weeks post-LMP...............Range (mIU/mL)  1-10  weeks.....................202-231,000  11-15 weeks..................22,536-234,990  16-22 weeks...................8,007-50,064  23-40 weeks...................1,600-49,413    NOTE:  This assay is not FDA approved for tumor screening,   diagnosis, or monitoring.       Hematocrit 30.6   35.1           Hemoglobin 10.2   12.0           Extra Tube   Hold for add-ons.  Comment: Auto resulted.      Hold for add-ons.  Comment: Auto resulted.            Hyaline Casts, UA     0         Immature Grans (Abs) 0.11  Comment: Mild elevation in immature granulocytes is non specific and can be seen in a variety of conditions including stress response, acute inflammation, trauma and pregnancy. Correlation with other laboratory and clinical findings is essential.   0.12  Comment: Mild elevation in immature granulocytes is non specific and can be seen in a variety of conditions including stress response, acute inflammation, trauma and pregnancy. Correlation with other laboratory and clinical findings is essential.           Immature Granulocytes 0.9   0.9           Ketones, UA     Negative         Leukocyte Esterase, UA     2+         Lymph # 1.48   1.20           Lymph % 11.9   8.9           MCH 26.9   27.3           MCHC 33.3   34.2           MCV 81   80           Microscopic Comment       Comment: Other formed elements not mentioned in the report are not present in the microscopic examination.         Mono # 0.92   0.81           Mono % 7.4   6.0           MPV 9.1   9.2           Neut % 78.1   83.1           NITRITE UA     Positive         NON-SQUAM EPITH     2         nRBC 0   0           Blood, UA     1+         pH, UA     7.0         Platelet Count 199   228           Potassium       3.8        PROTEIN TOTAL       7.6       Protein, UA     2+  Comment: Recommend a 24 hour urine protein or a urine protein/creatinine ratio if globulin induced proteinuria is clinically suspected.         RBC 3.79   4.39           RBC, UA     15         RDW 14.6   14.2           Sodium       134       Spec Grav UA     1.015         Squam Epithel, UA     3         Urobilinogen, UA     Negative         WBC, UA     >100         WBC Clumps, UA     Moderate         WBC 12.41   13.48           Yeast, UA     None                 Physical Exam:   Constitutional: She is oriented to person, place, and time. She appears well-developed and well-nourished. No distress.    HENT:   Head: Atraumatic.      Cardiovascular:  Normal rate.             Pulmonary/Chest: Effort normal. No respiratory distress.        Abdominal: Soft. There is no right CVA tenderness and no left CVA tenderness.                 Neurological: She is alert and oriented to person, place, and time.     Psychiatric: She has a normal mood and affect. Her behavior is normal. Judgment and thought content normal.       Review of Systems   Constitutional:  Negative for appetite change, chills and fever.   Eyes:  Negative for visual disturbance.   Respiratory:  Negative for shortness of breath.    Cardiovascular:  Negative for chest pain.   Gastrointestinal:  Positive for nausea. Negative for abdominal pain, constipation, diarrhea and vomiting.   Genitourinary:  Negative for dysmenorrhea, dyspareunia, pelvic pain, vaginal bleeding, vaginal discharge, postcoital bleeding and vaginal odor.   Musculoskeletal:  Negative for back pain.   Neurological:  Negative for syncope and headaches.   Psychiatric/Behavioral:  Negative for depression. The patient is not nervous/anxious.

## 2025-05-24 NOTE — PLAN OF CARE
Problem:  Fall Injury Risk  Goal: Absence of Fall, Infant Drop and Related Injury  Outcome: Adequate for Care Transition     Problem: Adult Inpatient Plan of Care  Goal: Plan of Care Review  Outcome: Adequate for Care Transition  Goal: Patient-Specific Goal (Individualized)  Outcome: Adequate for Care Transition  Goal: Absence of Hospital-Acquired Illness or Injury  Outcome: Adequate for Care Transition  Goal: Optimal Comfort and Wellbeing  Outcome: Adequate for Care Transition  Goal: Readiness for Transition of Care  Outcome: Adequate for Care Transition     Problem: Pain Acute  Goal: Optimal Pain Control and Function  Outcome: Adequate for Care Transition     Problem: UTI (Urinary Tract Infection)  Goal: Improved Infection Symptoms  Outcome: Adequate for Care Transition     Problem: Nausea and Vomiting  Goal: Nausea and Vomiting Relief  Outcome: Adequate for Care Transition

## 2025-05-24 NOTE — PLAN OF CARE
Problem: Adult Inpatient Plan of Care  Goal: Plan of Care Review  Outcome: Progressing     Problem: Adult Inpatient Plan of Care  Goal: Optimal Comfort and Wellbeing  Outcome: Progressing     Problem: Pain Acute  Goal: Optimal Pain Control and Function  Outcome: Progressing     Problem: UTI (Urinary Tract Infection)  Goal: Improved Infection Symptoms  Outcome: Progressing     Problem: Nausea and Vomiting  Goal: Nausea and Vomiting Relief  Outcome: Progressing

## 2025-05-25 LAB — BACTERIA UR CULT: ABNORMAL

## 2025-06-10 ENCOUNTER — PATIENT MESSAGE (OUTPATIENT)
Dept: OTHER | Facility: OTHER | Age: 30
End: 2025-06-10
Payer: COMMERCIAL

## 2025-06-10 ENCOUNTER — ROUTINE PRENATAL (OUTPATIENT)
Dept: OBSTETRICS AND GYNECOLOGY | Facility: CLINIC | Age: 30
End: 2025-06-10
Payer: COMMERCIAL

## 2025-06-10 VITALS
HEART RATE: 81 BPM | DIASTOLIC BLOOD PRESSURE: 66 MMHG | BODY MASS INDEX: 33.11 KG/M2 | WEIGHT: 163.94 LBS | SYSTOLIC BLOOD PRESSURE: 104 MMHG

## 2025-06-10 DIAGNOSIS — Z3A.16 16 WEEKS GESTATION OF PREGNANCY: ICD-10-CM

## 2025-06-10 DIAGNOSIS — Z36.3 ENCOUNTER FOR ROUTINE SCREENING FOR MALFORMATION USING ULTRASONICS: ICD-10-CM

## 2025-06-10 DIAGNOSIS — Z86.32 HISTORY OF GESTATIONAL DIABETES: ICD-10-CM

## 2025-06-10 DIAGNOSIS — Z98.891 HISTORY OF CESAREAN DELIVERY: ICD-10-CM

## 2025-06-10 DIAGNOSIS — Z34.81 ENCOUNTER FOR SUPERVISION OF OTHER NORMAL PREGNANCY IN FIRST TRIMESTER: Primary | ICD-10-CM

## 2025-06-10 DIAGNOSIS — Z30.09 UNWANTED FERTILITY: ICD-10-CM

## 2025-06-10 PROCEDURE — 0502F SUBSEQUENT PRENATAL CARE: CPT | Mod: CPTII,S$GLB,, | Performed by: OBSTETRICS & GYNECOLOGY

## 2025-06-10 PROCEDURE — 99999 PR PBB SHADOW E&M-EST. PATIENT-LVL III: CPT | Mod: PBBFAC,,, | Performed by: OBSTETRICS & GYNECOLOGY

## 2025-06-10 RX ORDER — FLUCONAZOLE 150 MG/1
150 TABLET ORAL ONCE
COMMUNITY
Start: 2025-05-24

## 2025-06-10 RX ORDER — CETIRIZINE HYDROCHLORIDE 10 MG/1
10 TABLET ORAL DAILY
COMMUNITY
Start: 2025-05-30

## 2025-06-10 NOTE — PROGRESS NOTES
Patient with no complaints. Denies vaginal bleeding or cramping. Anatomy scan ordered. RTC in 4 weeks    Patient on daily macrobid for treatment of pyelo.     Vitals signs, FHTs, urine dip, and PE findings documented, reviewed and available in OB flow chart.       I spent a total of 20 minutes on the day of the visit.This includes face to face time and non-face to face time preparing to see the patient (eg, review of tests), Obtaining and/or reviewing separately obtained history, Documenting clinical information in the electronic or other health record, Independently interpreting resultsand communicating results to the patient/family/caregiver, or Care coordination.     Coffective counseling sheet Fall In Love discussed with mother. Reinforced immediate skin to skin, the magic first hour, importance of the first feeding and delaying routine procedures. Encouraged mother to download Coffective mobile zoran if she has not already done so. Mother verbalizes understanding.

## 2025-06-17 ENCOUNTER — PATIENT MESSAGE (OUTPATIENT)
Dept: OTHER | Facility: OTHER | Age: 30
End: 2025-06-17
Payer: COMMERCIAL

## 2025-06-23 ENCOUNTER — TELEPHONE (OUTPATIENT)
Dept: OBSTETRICS AND GYNECOLOGY | Facility: CLINIC | Age: 30
End: 2025-06-23
Payer: COMMERCIAL

## 2025-06-23 NOTE — TELEPHONE ENCOUNTER
----- Message from Nurse Benny sent at 6/23/2025 11:44 AM CDT -----  Zheng Beverly  MRN: [id number]  Home Phone      Not on file.  Work Phone      Not on file.  Mobile          [phone]    Patient Care Team:  No, Primary Doctor as PCP - Konstantin Tate MD as Obstetrician (Obstetrics and Gynecology)  Mel Hammond MD as Consulting Physician (Obstetrics and Gynecology)  Toro Prieto MD as Obstetrician (Obstetrics)  OB? Yes, 17w6d  What phone number can you be reached at?   Message:  on the phone, 18 weeks. States she has been extremely dizzy since yesterday. States she checked her BP and blood sugar but it was normal.

## 2025-06-23 NOTE — TELEPHONE ENCOUNTER
Instructed patient that it can be common to become dizzy or lightheaded in early pregnancy. This happens because of both the hormonal changes in your body and your increasing blood volume. Hormonal changes cause blood vessels to dilate and relax, causing blood pressure to drop. It is common to see this when standing for long periods of time or when changing positions, such as moving from sitting to standing. Lower blood sugar levels also may be a factor. During the first few weeks and months of pregnancy, many women experience nausea and vomiting that can be accompanied by dizziness or lead to dehydration, which in turn may cause dizziness.  Make sure you are drinking plenty of water, eating small healthy snacks in between meals and avoid quick or sudden movements when changing positions and avoid standing still for long periods of time.

## 2025-07-08 ENCOUNTER — PATIENT MESSAGE (OUTPATIENT)
Dept: OTHER | Facility: OTHER | Age: 30
End: 2025-07-08
Payer: COMMERCIAL

## 2025-07-09 ENCOUNTER — ROUTINE PRENATAL (OUTPATIENT)
Dept: OBSTETRICS AND GYNECOLOGY | Facility: CLINIC | Age: 30
End: 2025-07-09
Payer: COMMERCIAL

## 2025-07-09 ENCOUNTER — PROCEDURE VISIT (OUTPATIENT)
Dept: MATERNAL FETAL MEDICINE | Facility: CLINIC | Age: 30
End: 2025-07-09
Payer: COMMERCIAL

## 2025-07-09 VITALS
DIASTOLIC BLOOD PRESSURE: 66 MMHG | WEIGHT: 164.44 LBS | HEART RATE: 95 BPM | SYSTOLIC BLOOD PRESSURE: 118 MMHG | BODY MASS INDEX: 33.22 KG/M2

## 2025-07-09 DIAGNOSIS — Z36.3 ENCOUNTER FOR ROUTINE SCREENING FOR MALFORMATION USING ULTRASONICS: ICD-10-CM

## 2025-07-09 DIAGNOSIS — Z98.891 HISTORY OF CESAREAN DELIVERY: ICD-10-CM

## 2025-07-09 DIAGNOSIS — Z3A.20 20 WEEKS GESTATION OF PREGNANCY: ICD-10-CM

## 2025-07-09 DIAGNOSIS — Z86.32 HISTORY OF GESTATIONAL DIABETES: ICD-10-CM

## 2025-07-09 DIAGNOSIS — Z30.09 UNWANTED FERTILITY: ICD-10-CM

## 2025-07-09 DIAGNOSIS — Z34.82 ENCOUNTER FOR SUPERVISION OF OTHER NORMAL PREGNANCY IN SECOND TRIMESTER: Primary | ICD-10-CM

## 2025-07-09 PROCEDURE — 99999 PR PBB SHADOW E&M-EST. PATIENT-LVL III: CPT | Mod: PBBFAC,,, | Performed by: OBSTETRICS & GYNECOLOGY

## 2025-07-09 PROCEDURE — 0502F SUBSEQUENT PRENATAL CARE: CPT | Mod: CPTII,S$GLB,, | Performed by: OBSTETRICS & GYNECOLOGY

## 2025-07-09 PROCEDURE — 76805 OB US >/= 14 WKS SNGL FETUS: CPT | Mod: S$GLB,,, | Performed by: OBSTETRICS & GYNECOLOGY

## 2025-07-09 RX ORDER — FLUCONAZOLE 150 MG/1
150 TABLET ORAL ONCE
Qty: 1 TABLET | Refills: 0 | Status: SHIPPED | OUTPATIENT
Start: 2025-07-09 | End: 2025-07-09

## 2025-07-09 RX ORDER — ASPIRIN 81 MG/1
81 TABLET ORAL DAILY
COMMUNITY
Start: 2025-07-09 | End: 2026-04-15

## 2025-07-21 ENCOUNTER — TELEPHONE (OUTPATIENT)
Dept: OBSTETRICS AND GYNECOLOGY | Facility: CLINIC | Age: 30
End: 2025-07-21
Payer: COMMERCIAL

## 2025-07-21 NOTE — TELEPHONE ENCOUNTER
----- Message from Med Assistant Fields sent at 7/21/2025  3:34 PM CDT -----  Contact: SELF  Zheng Beverly  MRN: [id number]  Home Phone      Not on file.  Work Phone      Not on file.  Mobile          [phone]    Patient Care Team:  No, Primary Doctor as PCP - Konstantin Tate MD as Obstetrician (Obstetrics and Gynecology)  Mel Hammond MD as Consulting Physician (Obstetrics and Gynecology)  Toro rPieto MD as Obstetrician (Obstetrics)  OB? Yes, 21w6d  What phone number can you be reached at? [phone]  Message:  Would like to speak to nurse regarding issues with yeast infection.

## 2025-07-21 NOTE — TELEPHONE ENCOUNTER
Pt was called and she complained of vaginal itching and dysuria. Pt thinks she has a yeast infection from antibiotics. Asked pt if it burned when urine touched her vagina or when she urinates. Pt unsure and stated is could be a UTI. Pt on macrobid QD for the remainder of her pregnancy for frequent UTIs per pt. Appt scheduled with pt and she voiced understanding.

## 2025-07-22 ENCOUNTER — ROUTINE PRENATAL (OUTPATIENT)
Dept: OBSTETRICS AND GYNECOLOGY | Facility: CLINIC | Age: 30
End: 2025-07-22
Payer: COMMERCIAL

## 2025-07-22 VITALS
WEIGHT: 168.63 LBS | BODY MASS INDEX: 34.06 KG/M2 | HEART RATE: 86 BPM | DIASTOLIC BLOOD PRESSURE: 72 MMHG | SYSTOLIC BLOOD PRESSURE: 124 MMHG

## 2025-07-22 DIAGNOSIS — Z3A.22 22 WEEKS GESTATION OF PREGNANCY: ICD-10-CM

## 2025-07-22 DIAGNOSIS — Z86.32 HISTORY OF GESTATIONAL DIABETES: ICD-10-CM

## 2025-07-22 DIAGNOSIS — O26.892 VAGINAL DISCHARGE DURING PREGNANCY IN SECOND TRIMESTER: ICD-10-CM

## 2025-07-22 DIAGNOSIS — Z34.82 ENCOUNTER FOR SUPERVISION OF OTHER NORMAL PREGNANCY IN SECOND TRIMESTER: Primary | ICD-10-CM

## 2025-07-22 DIAGNOSIS — N89.8 VAGINAL DISCHARGE DURING PREGNANCY IN SECOND TRIMESTER: ICD-10-CM

## 2025-07-22 DIAGNOSIS — Z98.891 HISTORY OF CESAREAN DELIVERY: ICD-10-CM

## 2025-07-22 DIAGNOSIS — Z30.09 UNWANTED FERTILITY: ICD-10-CM

## 2025-07-22 PROCEDURE — 81515 NFCT DS BV&VAGINITIS DNA ALG: CPT | Performed by: OBSTETRICS & GYNECOLOGY

## 2025-07-22 PROCEDURE — 99999 PR PBB SHADOW E&M-EST. PATIENT-LVL III: CPT | Mod: PBBFAC,,, | Performed by: OBSTETRICS & GYNECOLOGY

## 2025-07-22 PROCEDURE — 0502F SUBSEQUENT PRENATAL CARE: CPT | Mod: CPTII,S$GLB,, | Performed by: OBSTETRICS & GYNECOLOGY

## 2025-07-22 NOTE — PROGRESS NOTES
Patient with no complaints. Denies vaginal bleeding or cramping.  Good FM. Discussed with patient having glucose testing for gestational diabetes preformed between 24-28 weeks. RTC in 4 weeks.     Patient reports some discharge and irritation. Affirm collected.     Vitals signs, FHTs, urine dip, and PE findings documented, reviewed and available in OB flow chart.         I spent a total of 20 minutes on the day of the visit.This includes face to face time and non-face to face time preparing to see the patient (eg, review of tests), Obtaining and/or reviewing separately obtained history, Documenting clinical information in the electronic or other health record, Independently interpreting resultsand communicating results to the patient/family/caregiver, or Care coordination.     Coffective counseling sheet Learn Your Baby and Protect Breastfeeding discussed with mother. Instructed regarding feeding cues and methods to calm baby. Encouraged mother to download Coffective mobile ozran if she has not already done so.  Mother verbalized understanding.

## 2025-07-24 NOTE — TELEPHONE ENCOUNTER
----- Message from Med Assistant Fields sent at 7/24/2025 10:29 AM CDT -----  Contact: SELF  Zheng Beverly  MRN: [id number]  Home Phone      Not on file.  Work Phone      Not on file.  Mobile          [phone]    Patient Care Team:  No, Primary Doctor as PCP - Konstantin Tate MD as Obstetrician (Obstetrics and Gynecology)  Mel Hammond MD as Consulting Physician (Obstetrics and Gynecology)  Toro Prieto MD as Obstetrician (Obstetrics)  OB? Yes, 22w2d  What phone number can you be reached at? [phone]  Message: Follow up on medication that was to be called in for yeast infection.

## 2025-07-25 RX ORDER — FLUCONAZOLE 150 MG/1
150 TABLET ORAL DAILY
Qty: 1 TABLET | Refills: 0 | Status: SHIPPED | OUTPATIENT
Start: 2025-07-25 | End: 2025-07-26

## 2025-07-26 LAB
BACTERIAL VAGINOSIS DNA (OHS): NOT DETECTED
CANDIDA GLABRATA/KRUSEI DNA (OHS): DETECTED
CANDIDA SPECIES DNA (OHS): DETECTED
TRICHOMONAS VAGINALIS DNA (OHS): NOT DETECTED

## 2025-08-05 ENCOUNTER — PATIENT MESSAGE (OUTPATIENT)
Dept: OTHER | Facility: OTHER | Age: 30
End: 2025-08-05
Payer: COMMERCIAL

## 2025-08-07 ENCOUNTER — LAB VISIT (OUTPATIENT)
Dept: LAB | Facility: HOSPITAL | Age: 30
End: 2025-08-07
Attending: OBSTETRICS & GYNECOLOGY
Payer: COMMERCIAL

## 2025-08-07 ENCOUNTER — ROUTINE PRENATAL (OUTPATIENT)
Dept: OBSTETRICS AND GYNECOLOGY | Facility: CLINIC | Age: 30
End: 2025-08-07
Payer: COMMERCIAL

## 2025-08-07 VITALS
HEART RATE: 109 BPM | BODY MASS INDEX: 33.57 KG/M2 | DIASTOLIC BLOOD PRESSURE: 70 MMHG | SYSTOLIC BLOOD PRESSURE: 102 MMHG | WEIGHT: 166.25 LBS

## 2025-08-07 DIAGNOSIS — Z34.82 ENCOUNTER FOR SUPERVISION OF OTHER NORMAL PREGNANCY IN SECOND TRIMESTER: ICD-10-CM

## 2025-08-07 DIAGNOSIS — Z34.82 ENCOUNTER FOR SUPERVISION OF OTHER NORMAL PREGNANCY IN SECOND TRIMESTER: Primary | ICD-10-CM

## 2025-08-07 DIAGNOSIS — Z3A.24 24 WEEKS GESTATION OF PREGNANCY: ICD-10-CM

## 2025-08-07 DIAGNOSIS — Z98.891 HISTORY OF CESAREAN DELIVERY: ICD-10-CM

## 2025-08-07 DIAGNOSIS — Z30.09 UNWANTED FERTILITY: ICD-10-CM

## 2025-08-07 DIAGNOSIS — Z86.32 HISTORY OF GESTATIONAL DIABETES: ICD-10-CM

## 2025-08-07 LAB
ABSOLUTE EOSINOPHIL (OHS): 0.23 K/UL
ABSOLUTE MONOCYTE (OHS): 0.5 K/UL (ref 0.3–1)
ABSOLUTE NEUTROPHIL COUNT (OHS): 6.07 K/UL (ref 1.8–7.7)
BASOPHILS # BLD AUTO: 0.03 K/UL
BASOPHILS NFR BLD AUTO: 0.3 %
ERYTHROCYTE [DISTWIDTH] IN BLOOD BY AUTOMATED COUNT: 15.1 % (ref 11.5–14.5)
HCT VFR BLD AUTO: 34.6 % (ref 37–48.5)
HGB BLD-MCNC: 11.1 GM/DL (ref 12–16)
IMM GRANULOCYTES # BLD AUTO: 0.15 K/UL (ref 0–0.04)
IMM GRANULOCYTES NFR BLD AUTO: 1.7 % (ref 0–0.5)
LYMPHOCYTES # BLD AUTO: 1.79 K/UL (ref 1–4.8)
MCH RBC QN AUTO: 26.9 PG (ref 27–31)
MCHC RBC AUTO-ENTMCNC: 32.1 G/DL (ref 32–36)
MCV RBC AUTO: 84 FL (ref 82–98)
NUCLEATED RBC (/100WBC) (OHS): 0 /100 WBC
PLATELET # BLD AUTO: 250 K/UL (ref 150–450)
PMV BLD AUTO: 9.6 FL (ref 9.2–12.9)
RBC # BLD AUTO: 4.12 M/UL (ref 4–5.4)
RELATIVE EOSINOPHIL (OHS): 2.6 %
RELATIVE LYMPHOCYTE (OHS): 20.4 % (ref 18–48)
RELATIVE MONOCYTE (OHS): 5.7 % (ref 4–15)
RELATIVE NEUTROPHIL (OHS): 69.3 % (ref 38–73)
T PALLIDUM IGG+IGM SER QL: NORMAL
WBC # BLD AUTO: 8.77 K/UL (ref 3.9–12.7)

## 2025-08-07 PROCEDURE — 0502F SUBSEQUENT PRENATAL CARE: CPT | Mod: CPTII,S$GLB,, | Performed by: OBSTETRICS & GYNECOLOGY

## 2025-08-07 PROCEDURE — 99999 PR PBB SHADOW E&M-EST. PATIENT-LVL II: CPT | Mod: PBBFAC,,, | Performed by: OBSTETRICS & GYNECOLOGY

## 2025-08-07 PROCEDURE — 86593 SYPHILIS TEST NON-TREP QUANT: CPT

## 2025-08-07 PROCEDURE — 85025 COMPLETE CBC W/AUTO DIFF WBC: CPT

## 2025-08-07 PROCEDURE — 36415 COLL VENOUS BLD VENIPUNCTURE: CPT

## 2025-08-07 NOTE — PROGRESS NOTES
Patient with no complaints. Denies vaginal bleeding or cramping.  Good FM.  RTC in 4 weeks.     Vitals signs, FHTs, urine dip, and PE findings documented, reviewed and available in OB flow chart.     I spent a total of 20 minutes on the day of the visit.This includes face to face time and non-face to face time preparing to see the patient (eg, review of tests), Obtaining and/or reviewing separately obtained history, Documenting clinical information in the electronic or other health record, Independently interpreting resultsand communicating results to the patient/family/caregiver, or Care coordination.     Coffective counseling sheet Learn Your Baby and Protect Breastfeeding discussed with mother. Instructed regarding feeding cues and methods to calm baby. Encouraged mother to download Coffective mobile zoran if she has not already done so.  Mother verbalized understanding.

## 2025-08-13 ENCOUNTER — NURSE TRIAGE (OUTPATIENT)
Dept: ADMINISTRATIVE | Facility: CLINIC | Age: 30
End: 2025-08-13
Payer: COMMERCIAL

## 2025-08-13 ENCOUNTER — HOSPITAL ENCOUNTER (OUTPATIENT)
Facility: HOSPITAL | Age: 30
Discharge: HOME | End: 2025-08-13
Attending: STUDENT IN AN ORGANIZED HEALTH CARE EDUCATION/TRAINING PROGRAM | Admitting: STUDENT IN AN ORGANIZED HEALTH CARE EDUCATION/TRAINING PROGRAM
Payer: COMMERCIAL

## 2025-08-13 VITALS
DIASTOLIC BLOOD PRESSURE: 65 MMHG | HEIGHT: 59 IN | WEIGHT: 166.25 LBS | RESPIRATION RATE: 20 BRPM | SYSTOLIC BLOOD PRESSURE: 108 MMHG | OXYGEN SATURATION: 98 % | TEMPERATURE: 98 F | BODY MASS INDEX: 33.52 KG/M2 | HEART RATE: 82 BPM

## 2025-08-13 DIAGNOSIS — R10.2 PELVIC PAIN: ICD-10-CM

## 2025-08-13 LAB
BILIRUB UR QL STRIP.AUTO: NEGATIVE
CLARITY UR: CLEAR
COLOR UR AUTO: YELLOW
GLUCOSE UR QL STRIP: NEGATIVE
HGB UR QL STRIP: NEGATIVE
KETONES UR QL STRIP: NEGATIVE
LEUKOCYTE ESTERASE UR QL STRIP: NEGATIVE
NITRITE UR QL STRIP: NEGATIVE
PH UR STRIP: 7 [PH]
PROT UR QL STRIP: NEGATIVE
SP GR UR STRIP: 1.01
UROBILINOGEN UR STRIP-ACNC: NEGATIVE EU/DL

## 2025-08-13 PROCEDURE — 99211 OFF/OP EST MAY X REQ PHY/QHP: CPT

## 2025-08-13 PROCEDURE — 59025 FETAL NON-STRESS TEST: CPT

## 2025-08-13 PROCEDURE — 81003 URINALYSIS AUTO W/O SCOPE: CPT | Performed by: STUDENT IN AN ORGANIZED HEALTH CARE EDUCATION/TRAINING PROGRAM

## 2025-08-13 PROCEDURE — 59025 FETAL NON-STRESS TEST: CPT | Mod: 26,,, | Performed by: STUDENT IN AN ORGANIZED HEALTH CARE EDUCATION/TRAINING PROGRAM

## 2025-08-13 RX ORDER — ACETAMINOPHEN 500 MG
500 TABLET ORAL EVERY 6 HOURS PRN
Status: DISCONTINUED | OUTPATIENT
Start: 2025-08-13 | End: 2025-08-13 | Stop reason: HOSPADM

## 2025-08-13 RX ORDER — ONDANSETRON 8 MG/1
8 TABLET, ORALLY DISINTEGRATING ORAL EVERY 8 HOURS PRN
Status: DISCONTINUED | OUTPATIENT
Start: 2025-08-13 | End: 2025-08-13 | Stop reason: HOSPADM

## 2025-08-13 RX ORDER — SODIUM CHLORIDE, SODIUM LACTATE, POTASSIUM CHLORIDE, CALCIUM CHLORIDE 600; 310; 30; 20 MG/100ML; MG/100ML; MG/100ML; MG/100ML
INJECTION, SOLUTION INTRAVENOUS CONTINUOUS
Status: DISCONTINUED | OUTPATIENT
Start: 2025-08-13 | End: 2025-08-13 | Stop reason: HOSPADM

## 2025-08-14 ENCOUNTER — TELEPHONE (OUTPATIENT)
Dept: OBSTETRICS AND GYNECOLOGY | Facility: CLINIC | Age: 30
End: 2025-08-14
Payer: COMMERCIAL

## 2025-08-14 DIAGNOSIS — R10.2 PELVIC PAIN IN PREGNANCY: Primary | ICD-10-CM

## 2025-08-14 DIAGNOSIS — O26.899 PELVIC PAIN IN PREGNANCY: Primary | ICD-10-CM

## 2025-08-15 ENCOUNTER — PATIENT MESSAGE (OUTPATIENT)
Dept: OBSTETRICS AND GYNECOLOGY | Facility: CLINIC | Age: 30
End: 2025-08-15
Payer: COMMERCIAL

## 2025-08-15 DIAGNOSIS — N89.8 VAGINAL ITCHING: Primary | ICD-10-CM

## 2025-08-19 ENCOUNTER — PATIENT MESSAGE (OUTPATIENT)
Dept: OTHER | Facility: OTHER | Age: 30
End: 2025-08-19
Payer: COMMERCIAL

## 2025-08-22 ENCOUNTER — ROUTINE PRENATAL (OUTPATIENT)
Dept: OBSTETRICS AND GYNECOLOGY | Facility: CLINIC | Age: 30
End: 2025-08-22
Payer: COMMERCIAL

## 2025-08-22 VITALS
SYSTOLIC BLOOD PRESSURE: 110 MMHG | WEIGHT: 167.56 LBS | DIASTOLIC BLOOD PRESSURE: 68 MMHG | HEART RATE: 102 BPM | BODY MASS INDEX: 33.84 KG/M2

## 2025-08-22 DIAGNOSIS — Z86.32 HISTORY OF GESTATIONAL DIABETES: ICD-10-CM

## 2025-08-22 DIAGNOSIS — Z30.09 UNWANTED FERTILITY: ICD-10-CM

## 2025-08-22 DIAGNOSIS — Z3A.26 26 WEEKS GESTATION OF PREGNANCY: ICD-10-CM

## 2025-08-22 DIAGNOSIS — Z34.82 ENCOUNTER FOR SUPERVISION OF OTHER NORMAL PREGNANCY IN SECOND TRIMESTER: Primary | ICD-10-CM

## 2025-08-22 DIAGNOSIS — Z98.891 HISTORY OF CESAREAN DELIVERY: ICD-10-CM

## 2025-08-22 DIAGNOSIS — R10.2 PELVIC PAIN IN PREGNANCY: ICD-10-CM

## 2025-08-22 DIAGNOSIS — O26.899 PELVIC PAIN IN PREGNANCY: ICD-10-CM

## 2025-08-22 PROCEDURE — 99999 PR PBB SHADOW E&M-EST. PATIENT-LVL II: CPT | Mod: PBBFAC,,, | Performed by: OBSTETRICS & GYNECOLOGY

## 2025-08-27 ENCOUNTER — E-VISIT (OUTPATIENT)
Dept: OBSTETRICS AND GYNECOLOGY | Facility: CLINIC | Age: 30
End: 2025-08-27
Payer: COMMERCIAL

## 2025-08-27 DIAGNOSIS — B37.31 VULVOVAGINAL CANDIDIASIS: Primary | ICD-10-CM

## 2025-08-28 RX ORDER — FLUCONAZOLE 150 MG/1
150 TABLET ORAL ONCE
Qty: 1 TABLET | Refills: 0 | Status: SHIPPED | OUTPATIENT
Start: 2025-08-28 | End: 2025-08-28

## 2025-09-04 ENCOUNTER — ROUTINE PRENATAL (OUTPATIENT)
Dept: OBSTETRICS AND GYNECOLOGY | Facility: CLINIC | Age: 30
End: 2025-09-04
Payer: COMMERCIAL

## 2025-09-04 VITALS
HEART RATE: 85 BPM | SYSTOLIC BLOOD PRESSURE: 112 MMHG | BODY MASS INDEX: 34.17 KG/M2 | WEIGHT: 169.19 LBS | DIASTOLIC BLOOD PRESSURE: 88 MMHG

## 2025-09-04 DIAGNOSIS — Z30.09 UNWANTED FERTILITY: ICD-10-CM

## 2025-09-04 DIAGNOSIS — Z86.32 HISTORY OF GESTATIONAL DIABETES: ICD-10-CM

## 2025-09-04 DIAGNOSIS — Z34.82 ENCOUNTER FOR SUPERVISION OF OTHER NORMAL PREGNANCY IN SECOND TRIMESTER: Primary | ICD-10-CM

## 2025-09-04 DIAGNOSIS — Z98.891 HISTORY OF CESAREAN DELIVERY: ICD-10-CM

## 2025-09-04 DIAGNOSIS — Z3A.28 28 WEEKS GESTATION OF PREGNANCY: ICD-10-CM

## 2025-09-04 PROCEDURE — 0502F SUBSEQUENT PRENATAL CARE: CPT | Mod: CPTII,S$GLB,, | Performed by: OBSTETRICS & GYNECOLOGY

## 2025-09-04 PROCEDURE — 99999 PR PBB SHADOW E&M-EST. PATIENT-LVL III: CPT | Mod: PBBFAC,,, | Performed by: OBSTETRICS & GYNECOLOGY

## 2025-09-05 ENCOUNTER — TELEPHONE (OUTPATIENT)
Dept: OBSTETRICS AND GYNECOLOGY | Facility: CLINIC | Age: 30
End: 2025-09-05
Payer: COMMERCIAL